# Patient Record
Sex: FEMALE | Race: OTHER | URBAN - METROPOLITAN AREA
[De-identification: names, ages, dates, MRNs, and addresses within clinical notes are randomized per-mention and may not be internally consistent; named-entity substitution may affect disease eponyms.]

---

## 2021-01-28 ENCOUNTER — CLINICAL SUPPORT (OUTPATIENT)
Dept: FAMILY MEDICINE CLINIC | Facility: HOSPITAL | Age: 87
End: 2021-01-28

## 2021-01-28 DIAGNOSIS — Z23 ENCOUNTER FOR IMMUNIZATION: Primary | ICD-10-CM

## 2021-01-28 PROCEDURE — 0011A SARS-COV-2 / COVID-19 MRNA VACCINE (MODERNA) 100 MCG: CPT | Performed by: ANESTHESIOLOGY

## 2021-01-28 PROCEDURE — 91301 SARS-COV-2 / COVID-19 MRNA VACCINE (MODERNA) 100 MCG: CPT | Performed by: ANESTHESIOLOGY

## 2021-03-05 ENCOUNTER — IMMUNIZATIONS (OUTPATIENT)
Dept: FAMILY MEDICINE CLINIC | Facility: HOSPITAL | Age: 87
End: 2021-03-05

## 2021-03-05 DIAGNOSIS — Z23 ENCOUNTER FOR IMMUNIZATION: Primary | ICD-10-CM

## 2021-03-05 PROCEDURE — 0012A SARS-COV-2 / COVID-19 MRNA VACCINE (MODERNA) 100 MCG: CPT

## 2021-03-05 PROCEDURE — 91301 SARS-COV-2 / COVID-19 MRNA VACCINE (MODERNA) 100 MCG: CPT

## 2021-08-16 ENCOUNTER — DOCUMENTATION (OUTPATIENT)
Dept: FAMILY MEDICINE | Facility: CLINIC | Age: 86
End: 2021-08-16

## 2021-08-16 PROCEDURE — 99305 1ST NF CARE MODERATE MDM 35: CPT | Performed by: FAMILY MEDICINE

## 2021-08-16 RX ORDER — AMLODIPINE BESYLATE 5 MG/1
5 TABLET ORAL DAILY
COMMUNITY

## 2021-08-16 RX ORDER — LEVOTHYROXINE SODIUM 75 UG/1
75 TABLET ORAL
COMMUNITY

## 2021-08-16 RX ORDER — ASPIRIN 81 MG/1
81 TABLET ORAL DAILY
COMMUNITY
End: 2022-02-18

## 2021-08-16 RX ORDER — ALBUTEROL SULFATE 5 MG/ML
2.5 SOLUTION RESPIRATORY (INHALATION) EVERY 6 HOURS PRN
COMMUNITY
End: 2022-02-18

## 2021-08-16 RX ORDER — PAROXETINE HYDROCHLORIDE 20 MG/1
20 TABLET, FILM COATED ORAL DAILY
COMMUNITY

## 2021-08-16 RX ORDER — ROSUVASTATIN CALCIUM 10 MG/1
10 TABLET, COATED ORAL DAILY
COMMUNITY

## 2021-08-16 RX ORDER — LANSOPRAZOLE 30 MG/1
30 TABLET, ORALLY DISINTEGRATING, DELAYED RELEASE ORAL
COMMUNITY

## 2021-08-16 NOTE — PROGRESS NOTES
[facility] admission note:     Transition history: Ms. Robin had a right total knee arthroplasty on 8/11/2021 at [facility], by Dr. Erick Mitchell.  she has a long history of osteoarthritis.      Past medical history includes hypertension, GERD, arthritis, right total hip replacement, hypothyroidism, melanoma of the forehead, urgent continence, osteopenia by x-ray, anxiety, status post bilateral shoulder joint replacement, bilateral breast cancer: Right lumpectomy on the right, and lumpectomy on the left with lymph node removal and right x-ray, hearing loss right ear,  Her daughter reports that she has developed more redness around the medial aspect of the wound today compared with the past 2 days, and expressed concern that there is a wound infection developing.       Allergies: None.  Social:   No smoking or  EtOH.  She has 7 children, most of whom live on [facility] [address] where she lived until her recent move to have referred to be with her daughter and grandchildren.  Her daughter is a physician.    Vitals: Temperature 98, respirations 12, pulse 72, 125/70, 94% saturation,    Exam:     Patient is in no distress.  HEENT: Normal, Hearing adequate  No carotid bruits.  No lymphadenopathy.  Lungs: Good air entry throughout, no wheezes, rales, or rhonchi.  Heart: Regular rate and rhythm without murmur, gallop, click or rub.  Abdomen: Soft, non-tender, no hepatosplenomegaly or masses.  Extremities: Right knee with the median longitudinal incision, which is well approximated.  There is scant yellow discharge, 3 spots, on the dressing.  There is 5 x 4 cm area of hyperemia, not sharply demarcated, and somewhat patchy, medial to the wound.  This is minimally warm, and minimally tender.  There is no redness on the lateral aspect of the wound.  The joint line is not tender and there is no knee effusion.  She is able to bend her knee to approximately 40 degrees.  No peripheral edema noted.  Good pedal  pulses.  Bilateral bunions, no ulceration.  Good capillary refill bilaterally no calf tenderness.  Reviewed hospital records,      Imp/P:    1: Status post right total knee arthroplasty: I photographed the knee and sent for for photographs, which had no patient information associated with them to the nurse practitioner, Winter, of  Is very light.  She immediately responded to me with a note that there is no signs of infection.  She then informed the daughter by telephone.  PT and OT evaluations, with progression of tolerated.  Tramadol as needed pain.  2: Hypertension controlled.  3: Gait dysfunction status post TKA: PT, patient oriented to call bell.  4: GERD: Controlled.     The resident is capable of understanding her rights  I have informed the resident of her condition and discussed it with her.  Rehab potential: Good  Prognosis: good

## 2021-08-18 PROBLEM — E03.8 OTHER SPECIFIED HYPOTHYROIDISM: Status: ACTIVE | Noted: 2021-08-18

## 2021-08-18 PROBLEM — Z96.652 TOTAL KNEE REPLACEMENT STATUS, LEFT: Status: ACTIVE | Noted: 2021-08-18

## 2021-08-18 PROBLEM — Z85.3 HISTORY OF BREAST CANCER: Status: ACTIVE | Noted: 2021-08-18

## 2021-08-18 PROBLEM — Z96.651 HX OF TOTAL KNEE ARTHROPLASTY, RIGHT: Status: ACTIVE | Noted: 2021-08-18

## 2021-08-18 PROCEDURE — 99309 SBSQ NF CARE MODERATE MDM 30: CPT | Performed by: INTERNAL MEDICINE

## 2021-08-25 PROCEDURE — 99309 SBSQ NF CARE MODERATE MDM 30: CPT | Performed by: INTERNAL MEDICINE

## 2022-02-18 ENCOUNTER — APPOINTMENT (INPATIENT)
Dept: RADIOLOGY | Facility: HOSPITAL | Age: 87
DRG: 194 | End: 2022-02-18
Attending: HOSPITALIST
Payer: MEDICARE

## 2022-02-18 ENCOUNTER — APPOINTMENT (EMERGENCY)
Dept: RADIOLOGY | Facility: HOSPITAL | Age: 87
DRG: 194 | End: 2022-02-18
Attending: EMERGENCY MEDICINE
Payer: MEDICARE

## 2022-02-18 ENCOUNTER — HOSPITAL ENCOUNTER (INPATIENT)
Facility: HOSPITAL | Age: 87
LOS: 6 days | Discharge: NURSING, REHAB OR LONG-TERM CARE | DRG: 194 | End: 2022-02-24
Attending: EMERGENCY MEDICINE | Admitting: HOSPITALIST
Payer: MEDICARE

## 2022-02-18 DIAGNOSIS — J18.9 PNEUMONIA DUE TO INFECTIOUS ORGANISM, UNSPECIFIED LATERALITY, UNSPECIFIED PART OF LUNG: Primary | ICD-10-CM

## 2022-02-18 PROBLEM — R09.02 HYPOXIA: Status: ACTIVE | Noted: 2022-02-18

## 2022-02-18 LAB
ANION GAP SERPL CALC-SCNC: 12 MEQ/L (ref 3–15)
BACTERIA URNS QL MICRO: ABNORMAL /HPF
BASOPHILS # BLD: 0.01 K/UL (ref 0.01–0.1)
BASOPHILS NFR BLD: 0.3 %
BILIRUB UR QL STRIP.AUTO: NEGATIVE MG/DL
BNP SERPL-MCNC: 85 PG/ML
BUN SERPL-MCNC: 17 MG/DL (ref 8–20)
CALCIUM SERPL-MCNC: 8.9 MG/DL (ref 8.9–10.3)
CHLORIDE SERPL-SCNC: 96 MEQ/L (ref 98–109)
CLARITY UR REFRACT.AUTO: CLEAR
CO2 SERPL-SCNC: 26 MEQ/L (ref 22–32)
COLOR UR AUTO: YELLOW
CREAT SERPL-MCNC: 0.9 MG/DL (ref 0.6–1.1)
DIFFERENTIAL METHOD BLD: ABNORMAL
EOSINOPHIL # BLD: 0 K/UL (ref 0.04–0.36)
EOSINOPHIL NFR BLD: 0 %
ERYTHROCYTE [DISTWIDTH] IN BLOOD BY AUTOMATED COUNT: 12.4 % (ref 11.7–14.4)
FLUAV RNA SPEC QL NAA+PROBE: NEGATIVE
FLUBV RNA SPEC QL NAA+PROBE: NEGATIVE
GFR SERPL CREATININE-BSD FRML MDRD: 59 ML/MIN/1.73M*2
GLUCOSE SERPL-MCNC: 120 MG/DL (ref 70–99)
GLUCOSE UR STRIP.AUTO-MCNC: NEGATIVE MG/DL
HCT VFR BLDCO AUTO: 34.5 % (ref 35–45)
HGB BLD-MCNC: 11.7 G/DL (ref 11.8–15.7)
HGB UR QL STRIP.AUTO: 1
HYALINE CASTS #/AREA URNS LPF: ABNORMAL /LPF
IMM GRANULOCYTES # BLD AUTO: 0.01 K/UL (ref 0–0.08)
IMM GRANULOCYTES NFR BLD AUTO: 0.3 %
KETONES UR STRIP.AUTO-MCNC: ABNORMAL MG/DL
LACTATE SERPL-SCNC: 0.9 MMOL/L (ref 0.4–2)
LEUKOCYTE ESTERASE UR QL STRIP.AUTO: NEGATIVE
LYMPHOCYTES # BLD: 0.72 K/UL (ref 1.2–3.5)
LYMPHOCYTES NFR BLD: 18.2 %
MCH RBC QN AUTO: 29.8 PG (ref 28–33.2)
MCHC RBC AUTO-ENTMCNC: 33.9 G/DL (ref 32.2–35.5)
MCV RBC AUTO: 87.8 FL (ref 83–98)
MONOCYTES # BLD: 0.38 K/UL (ref 0.28–0.8)
MONOCYTES NFR BLD: 9.6 %
MUCOUS THREADS URNS QL MICRO: ABNORMAL /LPF
NEUTROPHILS # BLD: 2.84 K/UL (ref 1.7–7)
NEUTS SEG NFR BLD: 71.6 %
NITRITE UR QL STRIP.AUTO: NEGATIVE
NRBC BLD-RTO: 0 %
PDW BLD AUTO: 9.7 FL (ref 9.4–12.3)
PH UR STRIP.AUTO: 6 [PH]
PLAT MORPH BLD: NORMAL
PLATELET # BLD AUTO: 171 K/UL (ref 150–369)
PLATELET # BLD EST: ABNORMAL 10*3/UL
POTASSIUM SERPL-SCNC: 3.7 MEQ/L (ref 3.6–5.1)
PROT UR QL STRIP.AUTO: ABNORMAL
RBC # BLD AUTO: 3.93 M/UL (ref 3.93–5.22)
RBC #/AREA URNS HPF: ABNORMAL /HPF
RBC MORPH BLD: NORMAL
RSV RNA SPEC QL NAA+PROBE: NEGATIVE
SARS-COV-2 RNA RESP QL NAA+PROBE: NEGATIVE
SODIUM SERPL-SCNC: 134 MEQ/L (ref 136–144)
SP GR UR REFRACT.AUTO: 1.02
SQUAMOUS URNS QL MICRO: ABNORMAL /HPF
TROPONIN I SERPL HS-MCNC: 15.7 PG/ML
UROBILINOGEN UR STRIP-ACNC: 0.2 EU/DL
WBC # BLD AUTO: 3.96 K/UL (ref 3.8–10.5)
WBC #/AREA URNS HPF: ABNORMAL /HPF

## 2022-02-18 PROCEDURE — 81001 URINALYSIS AUTO W/SCOPE: CPT | Performed by: PHYSICIAN ASSISTANT

## 2022-02-18 PROCEDURE — 83605 ASSAY OF LACTIC ACID: CPT | Performed by: PHYSICIAN ASSISTANT

## 2022-02-18 PROCEDURE — 87486 CHLMYD PNEUM DNA AMP PROBE: CPT | Performed by: HOSPITALIST

## 2022-02-18 PROCEDURE — 83880 ASSAY OF NATRIURETIC PEPTIDE: CPT | Performed by: PHYSICIAN ASSISTANT

## 2022-02-18 PROCEDURE — 87581 M.PNEUMON DNA AMP PROBE: CPT | Performed by: HOSPITALIST

## 2022-02-18 PROCEDURE — 84443 ASSAY THYROID STIM HORMONE: CPT | Performed by: HOSPITALIST

## 2022-02-18 PROCEDURE — 87637 SARSCOV2&INF A&B&RSV AMP PRB: CPT | Performed by: EMERGENCY MEDICINE

## 2022-02-18 PROCEDURE — 84484 ASSAY OF TROPONIN QUANT: CPT | Performed by: PHYSICIAN ASSISTANT

## 2022-02-18 PROCEDURE — 80076 HEPATIC FUNCTION PANEL: CPT | Performed by: HOSPITALIST

## 2022-02-18 PROCEDURE — G1004 CDSM NDSC: HCPCS

## 2022-02-18 PROCEDURE — 93005 ELECTROCARDIOGRAM TRACING: CPT | Performed by: PHYSICIAN ASSISTANT

## 2022-02-18 PROCEDURE — 99223 1ST HOSP IP/OBS HIGH 75: CPT | Performed by: HOSPITALIST

## 2022-02-18 PROCEDURE — 36415 COLL VENOUS BLD VENIPUNCTURE: CPT | Performed by: PHYSICIAN ASSISTANT

## 2022-02-18 PROCEDURE — 25800000 HC PHARMACY IV SOLUTIONS: Performed by: PHYSICIAN ASSISTANT

## 2022-02-18 PROCEDURE — 99285 EMERGENCY DEPT VISIT HI MDM: CPT | Mod: 25

## 2022-02-18 PROCEDURE — 63600105 HC IODINE BASED CONTRAST: Performed by: HOSPITALIST

## 2022-02-18 PROCEDURE — 12000000 HC ROOM AND CARE MED/SURG

## 2022-02-18 PROCEDURE — 71045 X-RAY EXAM CHEST 1 VIEW: CPT

## 2022-02-18 PROCEDURE — 87040 BLOOD CULTURE FOR BACTERIA: CPT | Performed by: PHYSICIAN ASSISTANT

## 2022-02-18 PROCEDURE — 63600000 HC DRUGS/DETAIL CODE: Performed by: PHYSICIAN ASSISTANT

## 2022-02-18 PROCEDURE — 80048 BASIC METABOLIC PNL TOTAL CA: CPT | Performed by: PHYSICIAN ASSISTANT

## 2022-02-18 PROCEDURE — 85025 COMPLETE CBC W/AUTO DIFF WBC: CPT | Performed by: PHYSICIAN ASSISTANT

## 2022-02-18 RX ORDER — CHLORTHALIDONE 25 MG/1
25 TABLET ORAL DAILY
COMMUNITY
End: 2022-02-24 | Stop reason: HOSPADM

## 2022-02-18 RX ORDER — LOSARTAN POTASSIUM 50 MG/1
50 TABLET ORAL DAILY
COMMUNITY

## 2022-02-18 RX ORDER — BUTALB/ACETAMINOPHEN/CAFFEINE 50-325-40
1 TABLET ORAL
COMMUNITY
Start: 2021-04-19

## 2022-02-18 RX ORDER — CETIRIZINE HYDROCHLORIDE 10 MG/1
10 TABLET ORAL DAILY
COMMUNITY

## 2022-02-18 RX ORDER — MULTIVITAMIN WITH MINERALS
1 TABLET ORAL DAILY
COMMUNITY

## 2022-02-18 RX ORDER — PNV NO.95/FERROUS FUM/FOLIC AC 28MG-0.8MG
100 TABLET ORAL DAILY
COMMUNITY

## 2022-02-18 RX ORDER — OMEPRAZOLE 20 MG/1
20 TABLET, DELAYED RELEASE ORAL DAILY
COMMUNITY
End: 2022-02-24 | Stop reason: HOSPADM

## 2022-02-18 RX ADMIN — CEFTRIAXONE SODIUM 1 G: 1 INJECTION, POWDER, FOR SOLUTION INTRAMUSCULAR; INTRAVENOUS at 23:55

## 2022-02-18 RX ADMIN — IOHEXOL 115 ML: 350 INJECTION, SOLUTION INTRAVENOUS at 23:57

## 2022-02-18 ASSESSMENT — ENCOUNTER SYMPTOMS
ABDOMINAL PAIN: 0
DIAPHORESIS: 0
VOMITING: 0
WEAKNESS: 0
SHORTNESS OF BREATH: 0
FATIGUE: 1
PALPITATIONS: 0
COUGH: 0
FEVER: 1
LIGHT-HEADEDNESS: 0
CHEST TIGHTNESS: 0
HEADACHES: 0
DIZZINESS: 0
CHILLS: 1

## 2022-02-19 PROBLEM — E87.1 HYPONATREMIA: Status: ACTIVE | Noted: 2022-02-19

## 2022-02-19 PROBLEM — E87.6 HYPOKALEMIA: Status: ACTIVE | Noted: 2022-02-19

## 2022-02-19 PROBLEM — I10 PRIMARY HYPERTENSION: Status: ACTIVE | Noted: 2022-02-19

## 2022-02-19 PROBLEM — J18.9 COMMUNITY ACQUIRED PNEUMONIA: Status: ACTIVE | Noted: 2022-02-19

## 2022-02-19 PROBLEM — R79.89 ELEVATED TROPONIN: Status: ACTIVE | Noted: 2022-02-19

## 2022-02-19 PROBLEM — J18.0 BRONCHOPNEUMONIA: Status: ACTIVE | Noted: 2022-02-19

## 2022-02-19 PROBLEM — E87.1 HYPONATREMIA: Status: RESOLVED | Noted: 2022-02-19 | Resolved: 2022-02-19

## 2022-02-19 LAB
ALBUMIN SERPL-MCNC: 3.8 G/DL (ref 3.4–5)
ALBUMIN SERPL-MCNC: 4.3 G/DL (ref 3.4–5)
ALP SERPL-CCNC: 59 IU/L (ref 35–126)
ALP SERPL-CCNC: 61 IU/L (ref 35–126)
ALT SERPL-CCNC: 20 IU/L (ref 11–54)
ALT SERPL-CCNC: 21 IU/L (ref 11–54)
ANION GAP SERPL CALC-SCNC: 11 MEQ/L (ref 3–15)
AST SERPL-CCNC: 36 IU/L (ref 15–41)
AST SERPL-CCNC: 37 IU/L (ref 15–41)
ATRIAL RATE: 95
BASOPHILS # BLD: 0.01 K/UL (ref 0.01–0.1)
BASOPHILS NFR BLD: 0.3 %
BILIRUB DIRECT SERPL-MCNC: 0.2 MG/DL
BILIRUB SERPL-MCNC: 0.8 MG/DL (ref 0.3–1.2)
BILIRUB SERPL-MCNC: 1 MG/DL (ref 0.3–1.2)
BUN SERPL-MCNC: 12 MG/DL (ref 8–20)
C PNEUM DNA SPEC QL NAA+PROBE: NEGATIVE
CALCIUM SERPL-MCNC: 8.6 MG/DL (ref 8.9–10.3)
CHLORIDE SERPL-SCNC: 99 MEQ/L (ref 98–109)
CO2 SERPL-SCNC: 26 MEQ/L (ref 22–32)
CREAT SERPL-MCNC: 0.6 MG/DL (ref 0.6–1.1)
DIFFERENTIAL METHOD BLD: ABNORMAL
EOSINOPHIL # BLD: 0.01 K/UL (ref 0.04–0.36)
EOSINOPHIL NFR BLD: 0.3 %
ERYTHROCYTE [DISTWIDTH] IN BLOOD BY AUTOMATED COUNT: 12.3 % (ref 11.7–14.4)
GFR SERPL CREATININE-BSD FRML MDRD: >60 ML/MIN/1.73M*2
GLUCOSE SERPL-MCNC: 112 MG/DL (ref 70–99)
HCT VFR BLDCO AUTO: 33.9 % (ref 35–45)
HGB BLD-MCNC: 11.6 G/DL (ref 11.8–15.7)
IMM GRANULOCYTES # BLD AUTO: 0.01 K/UL (ref 0–0.08)
IMM GRANULOCYTES NFR BLD AUTO: 0.3 %
LYMPHOCYTES # BLD: 0.76 K/UL (ref 1.2–3.5)
LYMPHOCYTES NFR BLD: 19.8 %
M PNEUMO DNA SPEC QL NAA+PROBE: NEGATIVE
MCH RBC QN AUTO: 29.7 PG (ref 28–33.2)
MCHC RBC AUTO-ENTMCNC: 34.2 G/DL (ref 32.2–35.5)
MCV RBC AUTO: 86.7 FL (ref 83–98)
MONOCYTES # BLD: 0.32 K/UL (ref 0.28–0.8)
MONOCYTES NFR BLD: 8.3 %
NEUTROPHILS # BLD: 2.73 K/UL (ref 1.7–7)
NEUTS SEG NFR BLD: 71 %
NRBC BLD-RTO: 0 %
P AXIS: 49
PDW BLD AUTO: 9.3 FL (ref 9.4–12.3)
PLATELET # BLD AUTO: 159 K/UL (ref 150–369)
POTASSIUM SERPL-SCNC: 3.4 MEQ/L (ref 3.6–5.1)
PR INTERVAL: 212
PROT SERPL-MCNC: 6.5 G/DL (ref 6–8.2)
PROT SERPL-MCNC: 7.4 G/DL (ref 6–8.2)
QRS DURATION: 92
QT INTERVAL: 354
QTC CALCULATION(BAZETT): 444
R AXIS: -36
RBC # BLD AUTO: 3.91 M/UL (ref 3.93–5.22)
SODIUM SERPL-SCNC: 136 MEQ/L (ref 136–144)
T WAVE AXIS: 31
TROPONIN I SERPL HS-MCNC: 14.5 PG/ML
TSH SERPL DL<=0.05 MIU/L-ACNC: 0.39 MIU/L (ref 0.34–5.6)
VENTRICULAR RATE: 95
WBC # BLD AUTO: 3.84 K/UL (ref 3.8–10.5)

## 2022-02-19 PROCEDURE — 63600000 HC DRUGS/DETAIL CODE: Performed by: HOSPITALIST

## 2022-02-19 PROCEDURE — 63700000 HC SELF-ADMINISTRABLE DRUG: Performed by: HOSPITALIST

## 2022-02-19 PROCEDURE — 94667 MNPJ CHEST WALL 1ST: CPT

## 2022-02-19 PROCEDURE — 36415 COLL VENOUS BLD VENIPUNCTURE: CPT | Performed by: PHYSICIAN ASSISTANT

## 2022-02-19 PROCEDURE — 80053 COMPREHEN METABOLIC PANEL: CPT | Performed by: HOSPITALIST

## 2022-02-19 PROCEDURE — 99232 SBSQ HOSP IP/OBS MODERATE 35: CPT | Performed by: HOSPITALIST

## 2022-02-19 PROCEDURE — 85025 COMPLETE CBC W/AUTO DIFF WBC: CPT | Performed by: HOSPITALIST

## 2022-02-19 PROCEDURE — 20600000 HC ROOM AND CARE INTERMEDIATE/TELEMETRY

## 2022-02-19 PROCEDURE — 93010 ELECTROCARDIOGRAM REPORT: CPT | Performed by: INTERNAL MEDICINE

## 2022-02-19 PROCEDURE — 87449 NOS EACH ORGANISM AG IA: CPT | Performed by: HOSPITALIST

## 2022-02-19 PROCEDURE — 86317 IMMUNOASSAY INFECTIOUS AGENT: CPT | Performed by: HOSPITALIST

## 2022-02-19 PROCEDURE — 25000000 HC PHARMACY GENERAL: Performed by: HOSPITALIST

## 2022-02-19 PROCEDURE — 25800000 HC PHARMACY IV SOLUTIONS: Performed by: HOSPITALIST

## 2022-02-19 PROCEDURE — 25000000 HC PHARMACY GENERAL: Performed by: PHYSICIAN ASSISTANT

## 2022-02-19 PROCEDURE — 84484 ASSAY OF TROPONIN QUANT: CPT | Performed by: PHYSICIAN ASSISTANT

## 2022-02-19 PROCEDURE — 93005 ELECTROCARDIOGRAM TRACING: CPT | Performed by: HOSPITALIST

## 2022-02-19 RX ORDER — ROSUVASTATIN CALCIUM 10 MG/1
10 TABLET, COATED ORAL DAILY
Status: DISCONTINUED | OUTPATIENT
Start: 2022-02-19 | End: 2022-02-24 | Stop reason: HOSPADM

## 2022-02-19 RX ORDER — PAROXETINE HYDROCHLORIDE 20 MG/1
20 TABLET, FILM COATED ORAL DAILY
Status: DISCONTINUED | OUTPATIENT
Start: 2022-02-19 | End: 2022-02-24 | Stop reason: HOSPADM

## 2022-02-19 RX ORDER — LEVOTHYROXINE SODIUM 75 UG/1
75 TABLET ORAL
Status: DISCONTINUED | OUTPATIENT
Start: 2022-02-19 | End: 2022-02-24 | Stop reason: HOSPADM

## 2022-02-19 RX ORDER — AMLODIPINE BESYLATE 5 MG/1
5 TABLET ORAL DAILY
Status: DISCONTINUED | OUTPATIENT
Start: 2022-02-19 | End: 2022-02-24 | Stop reason: HOSPADM

## 2022-02-19 RX ORDER — LOSARTAN POTASSIUM 50 MG/1
50 TABLET ORAL DAILY
Status: DISCONTINUED | OUTPATIENT
Start: 2022-02-19 | End: 2022-02-24 | Stop reason: HOSPADM

## 2022-02-19 RX ORDER — ACETAMINOPHEN 325 MG/1
650 TABLET ORAL EVERY 4 HOURS PRN
Status: DISCONTINUED | OUTPATIENT
Start: 2022-02-19 | End: 2022-02-24 | Stop reason: HOSPADM

## 2022-02-19 RX ORDER — GUAIFENESIN 600 MG/1
1200 TABLET, EXTENDED RELEASE ORAL 2 TIMES DAILY
Status: DISCONTINUED | OUTPATIENT
Start: 2022-02-19 | End: 2022-02-24 | Stop reason: HOSPADM

## 2022-02-19 RX ORDER — ALBUTEROL SULFATE 0.83 MG/ML
2.5 SOLUTION RESPIRATORY (INHALATION) EVERY 4 HOURS PRN
Status: DISCONTINUED | OUTPATIENT
Start: 2022-02-19 | End: 2022-02-19

## 2022-02-19 RX ORDER — IPRATROPIUM BROMIDE AND ALBUTEROL SULFATE 2.5; .5 MG/3ML; MG/3ML
3 SOLUTION RESPIRATORY (INHALATION) EVERY 6 HOURS
Status: DISCONTINUED | OUTPATIENT
Start: 2022-02-19 | End: 2022-02-24 | Stop reason: HOSPADM

## 2022-02-19 RX ORDER — PNV NO.95/FERROUS FUM/FOLIC AC 28MG-0.8MG
100 TABLET ORAL DAILY
Status: DISCONTINUED | OUTPATIENT
Start: 2022-02-19 | End: 2022-02-24 | Stop reason: HOSPADM

## 2022-02-19 RX ORDER — HEPARIN SODIUM 5000 [USP'U]/ML
5000 INJECTION, SOLUTION INTRAVENOUS; SUBCUTANEOUS EVERY 8 HOURS
Status: DISCONTINUED | OUTPATIENT
Start: 2022-02-19 | End: 2022-02-24 | Stop reason: HOSPADM

## 2022-02-19 RX ADMIN — HEPARIN SODIUM 5000 UNITS: 5000 INJECTION, SOLUTION INTRAVENOUS; SUBCUTANEOUS at 14:13

## 2022-02-19 RX ADMIN — PAROXETINE 20 MG: 20 TABLET, FILM COATED ORAL at 09:19

## 2022-02-19 RX ADMIN — CEFTRIAXONE SODIUM 1 G: 1 INJECTION, POWDER, FOR SOLUTION INTRAMUSCULAR; INTRAVENOUS at 23:40

## 2022-02-19 RX ADMIN — IPRATROPIUM BROMIDE AND ALBUTEROL SULFATE 3 ML: .5; 3 SOLUTION RESPIRATORY (INHALATION) at 23:45

## 2022-02-19 RX ADMIN — IPRATROPIUM BROMIDE AND ALBUTEROL SULFATE 3 ML: .5; 3 SOLUTION RESPIRATORY (INHALATION) at 17:59

## 2022-02-19 RX ADMIN — GUAIFENESIN 1200 MG: 600 TABLET, EXTENDED RELEASE ORAL at 22:25

## 2022-02-19 RX ADMIN — GUAIFENESIN 1200 MG: 600 TABLET, EXTENDED RELEASE ORAL at 09:19

## 2022-02-19 RX ADMIN — HEPARIN SODIUM 5000 UNITS: 5000 INJECTION, SOLUTION INTRAVENOUS; SUBCUTANEOUS at 05:16

## 2022-02-19 RX ADMIN — LOSARTAN POTASSIUM 50 MG: 50 TABLET, FILM COATED ORAL at 09:19

## 2022-02-19 RX ADMIN — LEVOTHYROXINE SODIUM 75 MCG: 75 TABLET ORAL at 05:38

## 2022-02-19 RX ADMIN — AMLODIPINE BESYLATE 5 MG: 5 TABLET ORAL at 09:20

## 2022-02-19 RX ADMIN — POTASSIUM BICARBONATE 40 MEQ: 391 TABLET, EFFERVESCENT ORAL at 11:45

## 2022-02-19 RX ADMIN — HEPARIN SODIUM 5000 UNITS: 5000 INJECTION, SOLUTION INTRAVENOUS; SUBCUTANEOUS at 22:25

## 2022-02-19 RX ADMIN — Medication 100 MCG: at 09:19

## 2022-02-19 RX ADMIN — AZITHROMYCIN DIHYDRATE 500 MG: 500 INJECTION, POWDER, LYOPHILIZED, FOR SOLUTION INTRAVENOUS at 01:50

## 2022-02-19 RX ADMIN — IPRATROPIUM BROMIDE AND ALBUTEROL SULFATE 3 ML: .5; 3 SOLUTION RESPIRATORY (INHALATION) at 11:45

## 2022-02-19 RX ADMIN — ROSUVASTATIN CALCIUM 10 MG: 10 TABLET, FILM COATED ORAL at 09:20

## 2022-02-19 ASSESSMENT — PATIENT HEALTH QUESTIONNAIRE - PHQ9: SUM OF ALL RESPONSES TO PHQ9 QUESTIONS 1 & 2: 0

## 2022-02-19 NOTE — PLAN OF CARE
Problem: Adult Inpatient Plan of Care  Goal: Patient-Specific Goal (Individualized)  2/19/2022 1810 by Katherine Davis RN  Flowsheets (Taken 2/19/2022 1806)  Patient-Specific Goals (Include Timeframe): decreased 3 lnc during shift to 2 lnc  Individualized Care Needs: mini nebs  Anxieties, Fears or Concerns: To feel better   Plan of Care Review  Progress: improving  Outcome Summary: Patient continues on mini nebs with improved oxygen saturation 93-94% on 2.5 lnc. VSS. Denies pain/discomfort. OOB to chair 50% of day and utilizing commode with assistent. Poor PO intake due to decreased appetite. Will continue to monitor;

## 2022-02-19 NOTE — ED PROVIDER NOTES
Emergency Medicine Note  HPI   HISTORY OF PRESENT ILLNESS     89-year-old female history of hypothyroid, hypertension, breast CA presents to the ER for cough.  Patient states over the last several days she has felt very fatigued.  She states she has been coughing with productive sputum.  She lives at home with family who are concerned that today she developed a temperature of 103.  She received 500 mg of Tylenol prior to ER arrival.  She denies any chest pain, shortness of breath, nausea, vomiting, diarrhea or abdominal pain.  Denies urinary symptoms.  She states she previously was diagnosed with chronic bronchitis and used to use inhalers but has not done so in several years.  She does not follow with cardiology or pulmonology.  She states there are multiple sick family members with similar symptoms at home.  Rapid Covid test at home negative.            Patient History   PAST HISTORY     Reviewed from Nursing Triage:       Past Medical History:   Diagnosis Date   • Breast cancer (CMS/HCC)    • Hypertension    • Hypothyroidism        Past Surgical History:   Procedure Laterality Date   • BREAST LUMPECTOMY Bilateral    • HIP SURGERY Right    • REPLACEMENT TOTAL KNEE Right    • SHOULDER SURGERY Bilateral        History reviewed. No pertinent family history.    Social History     Tobacco Use   • Smoking status: Former Smoker   • Smokeless tobacco: Never Used   • Tobacco comment: smoked ages [age]   Vaping Use   • Vaping Use: Never used   Substance Use Topics   • Alcohol use: Yes     Comment: rare   • Drug use: Not Currently         Review of Systems   REVIEW OF SYSTEMS     Review of Systems   Constitutional: Positive for chills, fatigue and fever. Negative for diaphoresis.   Respiratory: Negative for cough, chest tightness and shortness of breath.    Cardiovascular: Positive for chest pain. Negative for palpitations and leg swelling.   Gastrointestinal: Negative for abdominal pain and vomiting.   Skin: Negative for  rash.   Neurological: Negative for dizziness, weakness, light-headedness and headaches.         VITALS     ED Vitals    Date/Time Temp Pulse Resp BP SpO2 [facility]   02/18/22 2200 -- 98 20 -- 97 % KMW   02/18/22 2112 37.7 °C (99.9 °F) 98 20 173/82 98 % GFC        Pulse Ox %: 98 % (on 4L O2) (02/18/22 2114)  Pulse Ox Interpretation: Normal (02/18/22 2114)  Heart Rate: 98 (02/18/22 2114)  Rhythm Strip Interpretation: Normal Sinus Rhythm (02/18/22 2114)     Physical Exam   PHYSICAL EXAM     Physical Exam  Vitals and nursing note reviewed.   Constitutional:       General: She is not in acute distress.     Appearance: She is well-developed.   Cardiovascular:      Rate and Rhythm: Regular rhythm. Tachycardia present.      Heart sounds: Normal heart sounds. No murmur heard.  Pulmonary:      Effort: Pulmonary effort is normal. Tachypnea present. No respiratory distress.      Breath sounds: Normal breath sounds. No stridor.      Comments: Slight tachypnea during conversation.  88% on RA  Chest:      Chest wall: No tenderness.   Abdominal:      General: Bowel sounds are normal. There is no distension.      Palpations: Abdomen is soft.      Tenderness: There is no abdominal tenderness. There is no guarding or rebound.   Musculoskeletal:      Cervical back: Normal range of motion and neck supple.   Skin:     General: Skin is warm and dry.   Neurological:      Mental Status: She is alert and oriented to person, place, and time.           PROCEDURES     Procedures     DATA     Results     Procedure Component Value Units Date/Time    SARS-CoV-2 (COVID-19), PCR Nasopharynx [[id number]]  (Normal) Collected: 02/18/22 2128    Specimen: Nasopharyngeal Swab from Nasopharynx Updated: 02/18/22 2225    Narrative:      The following orders were created for panel order SARS-CoV-2 (COVID-19), PCR Nasopharynx.  Procedure                               Abnormality         Status                     ---------                               -----------          ------                     SARS-COV-2 (COVID-19)/ F...[[id number]]  Normal              Final result                 Please view results for these tests on the individual orders.    SARS-COV-2 (COVID-19)/ FLU A/B, AND RSV, PCR Nasopharynx [[id number]]  (Normal) Collected: 02/18/22 2128    Specimen: Nasopharyngeal Swab from Nasopharynx Updated: 02/18/22 2225     SARS-CoV-2 (COVID-19) Negative     Influenza A Negative     Influenza B Negative     Respiratory Syncytial Virus Negative    B-type natriuretic peptide [[id number]]  (Normal) Collected: 02/18/22 2130    Specimen: Blood, Venous Updated: 02/18/22 2216     BNP 85 pg/mL     HS Troponin I (with 2 hour reflex) [[id number]]  (Abnormal) Collected: 02/18/22 2130    Specimen: Blood, Venous Updated: 02/18/22 2215     High Sens Troponin I 15.70 pg/mL     CBC and differential [[id number]]  (Abnormal) Collected: 02/18/22 2130    Specimen: Blood, Venous Updated: 02/18/22 2210     WBC 3.96 K/uL      RBC 3.93 M/uL      Hemoglobin 11.7 g/dL      Hematocrit 34.5 %      MCV 87.8 fL      MCH 29.8 pg      MCHC 33.9 g/dL      RDW 12.4 %      Platelets 171 K/uL      MPV 9.7 fL      Differential Type Auto     nRBC 0.0 %      Immature Granulocytes 0.3 %      Neutrophils 71.6 %      Lymphocytes 18.2 %      Monocytes 9.6 %      Eosinophils 0.0 %      Basophils 0.3 %      Immature Granulocytes, Absolute 0.01 K/uL      Neutrophils, Absolute 2.84 K/uL      Lymphocytes, Absolute 0.72 K/uL      Monocytes, Absolute 0.38 K/uL      Eosinophils, Absolute 0.00 K/uL      Basophils, Absolute 0.01 K/uL      RBC Morphology Normal     PLT Morphology Normal     Platelet Estimate Adequate (150,000-400,000)    Basic metabolic panel [[id number]]  (Abnormal) Collected: 02/18/22 2130    Specimen: Blood, Venous Updated: 02/18/22 2207     Sodium 134 mEQ/L      Potassium 3.7 mEQ/L      Chloride 96 mEQ/L      CO2 26 mEQ/L      BUN 17 mg/dL      Creatinine 0.9 mg/dL      Glucose 120 mg/dL      Calcium 8.9 mg/dL       eGFR 59.0 mL/min/1.73m*2      Anion Gap 12 mEQ/L     UA with reflex culture [[id number]]  (Abnormal) Collected: 02/18/22 2137    Specimen: Urine, Clean Catch Updated: 02/18/22 2148    Narrative:      The following orders were created for panel order UA with reflex culture.  Procedure                               Abnormality         Status                     ---------                               -----------         ------                     UA Reflex to Culture (Ma...[[id number]]  Abnormal            Final result               UA Microscopic[[id number]]               Abnormal            Final result                 Please view results for these tests on the individual orders.    UA Microscopic [[id number]]  (Abnormal) Collected: 02/18/22 2137    Specimen: Urine, Clean Catch Updated: 02/18/22 2148     RBC, Urine 0 TO 4 /HPF      WBC, Urine 0 TO 3 /HPF      Squamous Epithelial Rare /hpf      Hyaline Cast None Seen /lpf      Bacteria, Urine None Seen /HPF      MUCUSUA Rare /LPF     UA Reflex to Culture (Macroscopic) [[id number]]  (Abnormal) Collected: 02/18/22 2137    Specimen: Urine, Clean Catch Updated: 02/18/22 2147     Color, Urine Yellow     Clarity, Urine Clear     Specific Gravity, Urine 1.016     pH, Urine 6.0     Leukocyte Esterase Negative     Comment: Results can be falsely negative due to high specific gravity, some antibiotics, glucose >3 g/dl, or WBC other than neutrophils.        Nitrite, Urine Negative     Protein, Urine Trace     Comment: Trace False Positive Protein can be seen with alkaline or highly buffered urines or urine with high specific gravity. Suggest clinical correlation.        Glucose, Urine Negative mg/dL      Ketones, Urine Trace mg/dL      Comment: Free sulfhydryl drugs such as Mesna, Capoten, and Acetylcysteine (Mucomyst) may cause false positive ketonuria.        Urobilinogen, Urine 0.2 EU/dL      Bilirubin, Urine Negative mg/dL      Blood, Urine +1     Comment: The sensitivity of  the occult blood test is equivalent to approximately 4 intact RBC/HPF.       Lactate, w/ reflex repeat if > 2.0 [[id number]]  (Normal) Collected: 02/18/22 2130    Specimen: Blood, Venous Updated: 02/18/22 2146     Lactate 0.9 mmol/L     Blood Culture Blood, Venous [[id number]] Collected: 02/18/22 2130    Specimen: Blood, Venous Updated: 02/18/22 2144    Blood Culture Blood, Venous [[id number]] Collected: 02/18/22 2130    Specimen: Blood, Venous Updated: 02/18/22 2144          Imaging Results          X-RAY CHEST 1 VIEW (In process)                 ECG 12 lead    (Results Pending)       Scoring tools                                 ED Course & MDM   MDM / ED COURSE / CLINICAL IMPRESSIONS / DISPO     MDM    ED Course as of 02/19/22 0348   Fri Feb 18, 2022 2209 Lactate: 0.9 [EB]   2209 WBC: 3.96 [EB]   2222 BNP: 85 [EB]   2222 High Sens Troponin I(!): 15.70 [EB]   Sat Feb 19, 2022   0120 Pt daughter updated [EB]      ED Course User Index  [EB] Wendie Grant PA C         Clinical Impressions as of 02/19/22 0348   Pneumonia due to infectious organism, unspecified laterality, unspecified part of lung              Wendie Grant PA C  02/18/22 2351       Wendie Grant PA C  02/19/22 0348

## 2022-02-19 NOTE — NURSING NOTE
Patient verbalized during admission process that she is a DNR/DNI. Patient was instructed to bring in her advanced directive so that we have it on file. Dr Carlos notified of patient update.

## 2022-02-19 NOTE — ASSESSMENT & PLAN NOTE
Blood pressures are acceptable while here.    Continue holding the thiazide diuretic.   Continue Losartan and Amlodipine.

## 2022-02-19 NOTE — PLAN OF CARE
Problem: Adult Inpatient Plan of Care  Goal: Plan of Care Review  Flowsheets (Taken 2/19/2022 1430)  Progress: no change  Plan of Care Reviewed With: patient  Outcome Summary: Pt screened 2/2 MST score for unsure of weight loss. Pt in bed at time of visit and lunch at bedside 50% consumed.  Per pt she does not weigh herself, but has not noticed any weight changes.  Pt with outpatient weights that vary from 129-141#.  Current bed scale weight 122#.  Expect pt's intake to continue to improve as her PNA improves.    Goals:  Pt to meet >/= 50% of needs via PO  Weight maintenance    Recommendations:  Will liberalize diet to CARLO  Encourage PO as able  Continue to monitor need for supplements  Monitor weight weekly

## 2022-02-19 NOTE — PROGRESS NOTES
"   Hospital Medicine Service -  Daily Progress Note       SUBJECTIVE   Interval History: Patient reports feeling much better compared to arrival.  Still feeling weak.  We received a call from microbiology lab requesting viral panel.  ID physician was consulted.     OBJECTIVE      Vital signs in last 24 hours:  Temp:  [36.6 °C (97.9 °F)-37.7 °C (99.9 °F)] 36.6 °C (97.9 °F)  Heart Rate:  [81-98] 90  Resp:  [18-32] 18  BP: (105-173)/(56-82) 105/56    Intake/Output Summary (Last 24 hours) at 2/19/2022 1439  Last data filed at 2/19/2022 1000  Gross per 24 hour   Intake 310 ml   Output --   Net 310 ml       PHYSICAL EXAMINATION      Physical Exam   Visit Vitals  BP (!) 105/56 (BP Location: Right upper arm, Patient Position: Lying)   Pulse 90   Temp 36.6 °C (97.9 °F)   Resp 18   Ht 1.651 m (5' 5\")   Wt 55.3 kg (122 lb) Comment: bed scale at time of my visit   SpO2 95%   BMI 20.30 kg/m²     General:  Awake, alert.  No acute distress.  HEENT: Sclerae anicteric.  Moist mucus membranes.   Lungs: Bilateral ronchis. No wheezing.   Cardiovascular:  Regular rate and rhythm.  No murmurs.  Abdomen:  Soft, non tender, non distended.  Positive bowel sounds.  :NA  Extremities:  No edema bilaterally.  Neuro: Non-focal.  Skin: Pink  Psych: A/Ox3; full range affect, pleasant and cooperative.     LABS / IMAGING / TELE      Labs  CBC   CBC Results       02/19/22 02/18/22 0424 2130    WBC 3.84 3.96    RBC 3.91 3.93    HGB 11.6 11.7    HCT 33.9 34.5    MCV 86.7 87.8    MCH 29.7 29.8    MCHC 34.2 33.9     171         BMP or CMP   BMP Results       02/19/22 02/18/22 0424 2130     134    K 3.4 3.7    Cl 99 96    CO2 26 26    Glucose 112 120    BUN 12 17    Creatinine 0.6 0.9    Calcium 8.6 8.9    Anion Gap 11 12    EGFR >60.0 59.0        CMP Results       02/19/22 02/18/22 0424 2130     134    K 3.4 3.7    Cl 99 96    CO2 26 26    Glucose 112 120    BUN 12 17    Creatinine 0.6 0.9    Calcium 8.6 8.9    Anion Gap " 11 12    AST 37 36    ALT 20 21    Albumin 3.8 4.3    EGFR >60.0 59.0           INR          Imaging  CT ANGIOGRAPHY CHEST PULMONARY EMBOLISM WITH IV CONTRAST    Result Date: 2/19/2022  CLINICAL HISTORY: Pulmonary embolism (PE) suspected, high prob     IMPRESSION: 1.  Limited assessment due to significant motion artifact.. No large central pulmonary embolism. If there is continued concern consider repeat study when the patient can better tolerate the examination. 2.  Multifocal airspace disease greatest in the left lower lobe could be related to multifocal pneumonia, aspiration pneumonitis and/or atelectasis. 3.  Peribronchial thickening on the left with some narrowing possible endobronchial fluid in the left bronchi also raises the possibility of aspiration versus mucous. 4.  Mild left hilar and subcarinal lymphadenopathy. This could be reactive to the airspace disease although recommend follow-up chest CT in perhaps in 8-12 weeks to document resolution. 5.  Additional findings discussed below. Findings are concordant with preliminary report provided by Dr. Lino at 12:56 AM 2/19/2022 COMMENT: Comparison: Chest x-ray from 2/18/2022 Technique: CTA of the chest pulmonary embolism protocol was performed with contrast.  3D MIP and/or volume rendered image reconstruction performed and reviewed. 115 mL Omnipaque 350 given. CT DOSE:  One or more dose reduction techniques (e.g. automated exposure control, adjustment of the mA and/or kV according to patient size, use of iterative reconstruction technique) utilized for this examination. FINDINGS: LUNG, PLEURA AND LARGE AIRWAYS: Motion artifact limits assessment. Trachea and the proximal bronchi on the right remain patent. There is peribronchial thickening and narrowing in the left lower lobe and lingula which is associated with patchy airspace consolidation in the left lower lobe which could be related to pneumonia. It is difficult to further define the bronchial  findings due to the degree of motion artifact. However there does appear to be some endobronchial material within the left major bronchi as they course to the hilum possibly representing fluid. Aspiration should be considered. Some patchy airspace opacity versus atelectasis is also noted in the right middle lobe and minimally in the right lower lobe. There is no evidence of a pneumothorax. There may be trace left pleural fluid without significant effusion. VESSELS: Normal caliber main pulmonary artery. There is no main or lobar pulmonary embolism. The remaining pulmonary arteries are limited in their evaluation due to substantial motion artifact markedly degraded quality. Normal caliber of the thoracic aorta with no gross acute findings. At this chronic changes are noted throughout including the coronary arteries. HEART: Heart is enlarged. No pericardial effusion. MEDIASTINUM AND ISABELLE: There is left hilar lymphadenopathy. For example on axial image 121 there is a 1.7 cm lymph node. Follow-up would be recommended document resolution. Mild subcarinal lymphadenopathy measuring 1.4 cm. Minimal hiatal hernia. CHEST WALL AND LOWER NECK: Postsurgical changes left axilla. UPPER ABDOMEN:Atheromatous changes. Limited assessment of the unenhanced motion degraded upper abdominal structures reveals no gross findings of concern. BONES: Demineralization. Spinal curvature. Dextroconvex scoliosis. Degenerative disease. No acute or suspicious findings. Bilateral shoulder reversed arthroplasties.     X-RAY CHEST 1 VIEW    Result Date: 2/19/2022  Chest x-ray CLINICAL HISTORY: Shortness of breath. COMPARISON: None. COMMENT: Portable AP upright examination of the chest obtained. Cardiomediastinal silhouette and pulmonary vascularity within normal limits. Lungs are grossly clear.  Trachea is midline.  No pleural fluid or air. Uncoiling of the thoracic aorta.  Mild scoliosis of the thoracic spine. Bilateral shoulder arthroplasties.      IMPRESSION: No acute cardiopulmonary process.        ECG/Telemetry  I have independently reviewed the telemetry. No events for the last 24 hours.    ASSESSMENT AND PLAN      * Hypoxia  Assessment & Plan  Assessment: 88% on room air at initial evaluation by EMS and subsequently improved with 2 liters of supplemental O2.  Has recent sick contacts  No leukocytosis is noted.   Most likely viral infection- bronchopneumonia.     Plan:  Consult ID re respiratory viral panel.   Continue O2 supplementation.   SLP evaluation  Continue Ceftriaxone/ Azithromycin  Incentive spirometry and PEP therapy.      Primary hypertension  Assessment & Plan  Hold thiazine. Continue Losartan and Amlodipine.     Hypokalemia  Assessment & Plan  Replace K.     Elevated troponin  Assessment & Plan  Assessment: Troponin elevated at 15.70. Suspect secondary to demand ischemia in the setting of acute illness.  Not MI.     Bronchopneumonia  Assessment & Plan  Continue antibiotics w Ceftriaxone/ azithromycin  Add duoneb around the clock when awake.       Other specified hypothyroidism  Assessment & Plan  Continue levothyroxine.        VTE Assessment: Padua VTE Score: 5  VTE Prophylaxis Plan: heparin  Code Status: DNR (A.N.D.)  Estimated Discharge Date: 2/21/2022  Disposition Planning: tele     Nevaeh Curtis MD  2/19/2022

## 2022-02-19 NOTE — ED ATTESTATION NOTE
"I have personally seen and examined the patient.  I reviewed and agree with physician assistant’s assessment and plan of care, except as where stated below.  My examination, assessment, and plan of care of Merced Robin is as follows:     Patient is an 89-year-old female who presents the emergency department for evaluation of \"bronchitis \".  Patient states that for the past few days she has not been feeling well.  Complains of generalized malaise, cough.  Reports that she has multiple family members at home sick with the same thing.  Today, the patient had a fever.  She was given 500 mg of Tylenol just prior to EMS arrival.  On EMS arrival, patient was found to be satting 88% on room air, placed on 4 L nasal cannula oxygen and brought to the emergency department.    On arrival, patient is awake, alert, in no acute distress.  Heart rate is normal.  She is slightly tachypneic with rhonchorous respirations.  Abdomen is soft, nontender.  No lower extremity edema.    On arrival, temperature is 99.9.  She is on 4 L of nasal cannula oxygen to maintain sats in the mid nineties.  Will check Covid/flu/RSV, chest x-ray, lab work         Tiarra Fan MD  02/18/22 9564    "

## 2022-02-19 NOTE — PLAN OF CARE
Plan of Care Review  Progress: improving  Outcome Summary: Patient continues on mini nebs with improved oxygen saturation 93-94% on 2.5 lnc. VSS. Denies pain/discomfort. OOB to chair 50% of day and utilizing commode with assistent. Poor PO intake due to decreased appetite. Will continue to monitor;

## 2022-02-19 NOTE — ASSESSMENT & PLAN NOTE
Assessment: 88% on room air at initial evaluation by EMS, subsequently improved with 2 liters of supplemental O2. Has recent sick contacts. No leukocytosis is noted.   Most likely viral infection- bronchopneumonia, bacterial suprainfection seems unlikely.    Plan:  Continue supportive care.

## 2022-02-19 NOTE — ASSESSMENT & PLAN NOTE
Assessment: Na 134, secondary to urinary sodium loss with chlorthalidone use +/- lack of appetite and poor PO intake.    Plan:  Hold chlorthalidone. Monitor volume status closely.   Repeat BMP in AM.   Slow rate of IV NS as concerned that the patient is slightly dehydrated.

## 2022-02-19 NOTE — H&P
Hospital Medicine Service -  History & Physical        CHIEF COMPLAINT   Generalized Malaise, cough     HISTORY OF PRESENT ILLNESS      Merced Robin is a 89 y.o. female with a past medical history of hypothyroidism, HTN and breast cancer who presents with generalized malaise, cough and fever.  Patient reports that her grandson was recently ill with a cough and fever and subsequently she started to have the same symptoms. She states that her symptoms consist of cough, congestion, fevers, chills, fatigue, lack of appetite, generalized malaise and shortness of breath with exertion. She states that her daughter noted that the patient was having shortness of breath at rest and that she developed a fever of Tmax 103 today leading to her to come to the ER for further evaluation. It was noted that the patient was hypoxic too 88% on arrival of EMS and improved with supplemental O2 via nasal cannula at 2 L. She denies nausea, vomiting, abdominal pain, chest pain, palpitations, pain with deep respirations, dysuria and diarrhea. She denies the use of any antibiotics recently. She reports that she has a history of chronic bronchitis when she was younger.    In the ER, the patient received a chest xray and was started on IV Ceftriaxone and IV Azithromycin to provide coverage for possible pneumonia.     PAST MEDICAL AND SURGICAL HISTORY      Past Medical History:   Diagnosis Date   • Breast cancer (CMS/HCC)    • Hypertension    • Hypothyroidism        Past Surgical History:   Procedure Laterality Date   • BREAST LUMPECTOMY Bilateral    • HIP SURGERY Right    • REPLACEMENT TOTAL KNEE Right    • SHOULDER SURGERY Bilateral        PCP: Pt States, No Pcp    MEDICATIONS      Prior to Admission medications    Medication Sig Start Date End Date Taking? Authorizing Provider   calcium citrate-vitamin D3 (CITRACAL+D) 315 mg-5 mcg (200 unit) per tablet Take 1 tablet by mouth once daily. 4/19/21  Yes Provider, MD Halley   amLODIPine  (NORVASC) 5 mg tablet Take 5 mg by mouth daily.    Halley Espino MD   cetirizine (ZyrTEC) 10 mg tablet Take 10 mg by mouth daily.    Halley Espino MD   chlorthalidone (HYGROTEN) 25 mg tablet Take 25 mg by mouth daily.    Halley Espino MD   cyanocobalamin (VITAMIN B12) 100 mcg tablet Take 100 mcg by mouth daily.    Halley Espino MD   lansoprazole (PREVACID SOLUTAB) 30 mg disintegrating tablet Take 30 mg by mouth daily before breakfast.    Halley Espino MD   levothyroxine (SYNTHROID) 75 mcg tablet Take 75 mcg by mouth daily.    Halley Espino MD   losartan (COZAAR) 50 mg tablet Take 50 mg by mouth daily.    Halley Espino MD   multivitamin with minerals (DAILY MULTIVITAMIN-MINERALS) tablet Take 1 tablet by mouth daily.    Halley Espino MD   omeprazole OTC (PriLOSEC OTC) 20 mg EC tablet Take 20 mg by mouth daily.    Halley Espino MD   PARoxetine (PAXIL) 20 mg tablet Take 20 mg by mouth daily.    Halley Espino MD   rosuvastatin (CRESTOR) 10 mg tablet Take 10 mg by mouth daily.    Halley Espino MD   albuterol 5 mg/mL nebulizer solution 2.5 mg by continuous nebulization route every 6 (six) hours as needed for shortness of breath.  2/18/22  Halley Espino MD   aspirin 81 mg enteric coated tablet Take 81 mg by mouth daily.  2/18/22  Halley Espino MD       ALLERGIES      Sulfa (sulfonamide antibiotics)    FAMILY HISTORY      History reviewed. No pertinent family history.    SOCIAL HISTORY      Social History     Socioeconomic History   • Marital status: Single     Spouse name: None   • Number of children: None   • Years of education: None   • Highest education level: None   Occupational History   • None   Tobacco Use   • Smoking status: Former Smoker   • Smokeless tobacco: Never Used   • Tobacco comment: smoked ages [age]   Vaping Use   • Vaping Use: Never used   Substance and Sexual Activity   • Alcohol use: Yes     Comment:  rare   • Drug use: Not Currently   • Sexual activity: None   Other Topics Concern   • None   Social History Narrative   • None     Social Determinants of Health     Financial Resource Strain: Not on file   Food Insecurity: No Food Insecurity   • Worried About Running Out of Food in the Last Year: Never true   • Ran Out of Food in the Last Year: Never true   Transportation Needs: Not on file   Physical Activity: Not on file   Stress: Not on file   Social Connections: Not on file   Intimate Partner Violence: Not on file   Housing Stability: Not on file       REVIEW OF SYSTEMS      All other systems reviewed and negative except as noted in HPI    PHYSICAL EXAMINATION      Temp:  [37.7 °C (99.9 °F)] 37.7 °C (99.9 °F)  Heart Rate:  [98] 98  Resp:  [20] 20  BP: (173)/(82) 173/82  There is no height or weight on file to calculate BMI.    Physical Exam  Vitals and nursing note reviewed.   Constitutional:       Comments: Thin, appears stated age, conversational dyspnea is present, tachypneic   HENT:      Head: Normocephalic and atraumatic.      Right Ear: External ear normal.      Left Ear: External ear normal.      Nose: Nose normal. No congestion or rhinorrhea.      Mouth/Throat:      Mouth: Mucous membranes are moist.      Pharynx: Oropharynx is clear. No oropharyngeal exudate or posterior oropharyngeal erythema.   Eyes:      General: No scleral icterus.     Extraocular Movements: Extraocular movements intact.      Conjunctiva/sclera: Conjunctivae normal.      Pupils: Pupils are equal, round, and reactive to light.   Cardiovascular:      Rate and Rhythm: Regular rhythm. Tachycardia present.      Heart sounds: No murmur heard.  Pulmonary:      Effort: Respiratory distress present.      Breath sounds: Wheezing and rhonchi present.      Comments: Bilateral scattered expiratory wheeze and scattered rhonchi at the bases, tachypnea, conversational dyspnea is present, on 2 liters of supplemental O2  Chest:      Chest wall: No  tenderness.   Abdominal:      General: Bowel sounds are normal. There is no distension.      Palpations: Abdomen is soft.      Tenderness: There is no abdominal tenderness. There is no guarding or rebound.   Genitourinary:     Comments: deferred  Musculoskeletal:         General: No swelling or tenderness.      Cervical back: Normal range of motion and neck supple. No rigidity or tenderness.      Right lower leg: No edema.      Left lower leg: No edema.   Lymphadenopathy:      Cervical: No cervical adenopathy.   Skin:     General: Skin is warm and dry.      Findings: No rash.   Neurological:      General: No focal deficit present.      Mental Status: She is alert and oriented to person, place, and time.      Cranial Nerves: No cranial nerve deficit.      Sensory: No sensory deficit.      Motor: No weakness.      Coordination: Coordination normal.   Psychiatric:         Mood and Affect: Mood normal.         Behavior: Behavior normal.         LABS / IMAGING / EKG        Labs  I have reviewed the patient's pertinent labs.  Significant abnormals are Na 134, Hgb 11.7, HS Trop 15.70.  Lab Results   Component Value Date    GLUCOSE 120 (H) 02/18/2022    CALCIUM 8.9 02/18/2022     (L) 02/18/2022    K 3.7 02/18/2022    CO2 26 02/18/2022    CL 96 (L) 02/18/2022    BUN 17 02/18/2022    CREATININE 0.9 02/18/2022     Lab Results   Component Value Date    WBC 3.96 02/18/2022    HGB 11.7 (L) 02/18/2022    HCT 34.5 (L) 02/18/2022    MCV 87.8 02/18/2022     02/18/2022     Troponin I Results       02/18/22     2130    HS Troponin I 15.70      Results for NICK CHAPA (MRN [id number]) as of 2/18/2022 23:23   Ref. Range 2/18/2022 21:37   Color, Urine Latest Ref Range: Yellow, Colorless  Yellow   Clarity, Urine Latest Ref Range: Clear  Clear   Specific Gravity, Urine Latest Ref Range: 1.005 - 1.030  1.016   pH, Urine Latest Ref Range: 4.5 - 8.0  6.0   Leukocyte Esterase Latest Ref Range: Negative  Negative   Nitrite, Urine  Latest Ref Range: Negative  Negative   Protein, Urine Latest Ref Range: Negative  Trace (A)   Glucose, Urine Latest Ref Range: Negative mg/dL Negative   Ketones, Urine Latest Ref Range: Negative mg/dL Trace (A)   Urobilinogen, Urine Latest Ref Range: <2.0 EU/dL EU/dL 0.2   Bilirubin, Urine Latest Ref Range: Negative mg/dL Negative   Blood, Urine Latest Ref Range: Negative  +1 (A)   RBC, Urine Latest Ref Range: 0 TO 4 /HPF 0 TO 4   WBC, Urine Latest Ref Range: 0 TO 3 /HPF 0 TO 3   Bacteria, Urine Latest Ref Range: None Seen /HPF None Seen   Squamous Epithelial Latest Ref Range: None Seen /hpf Rare (A)   Hyaline Cast Latest Ref Range: None Seen /lpf None Seen   Results for NICK CHAPA (MRN [id number]) as of 2/18/2022 23:23   Ref. Range 2/18/2022 21:28   Influenza A Latest Ref Range: Negative  Negative   Influenza B Latest Ref Range: Negative  Negative   Respiratory Syncytial Virus Latest Ref Range: Negative  Negative   SARS-CoV-2 (COVID-19) Latest Ref Range: Negative  Negative       Imaging  I have independently reviewed the pertinent imaging from the last 24 hrs.  Chest Xray- personally reviewed    SARS-CoV-2 (COVID-19) (no units)   Date/Time Value   02/18/2022 2128 Negative       ASSESSMENT AND PLAN           * Hypoxia  Assessment & Plan  Assessment: 88% on room air at initial evaluation by EMS and subsequently improved with 2 liters of supplemental O2. Chest xray is personally reviewed with ? Right middle lobe infiltrate. She has recent sick contacts, however, also has been less ambulatory due to feeling unwell. No leukocytosis is noted. Differentials include pneumonia vs pulmonary embolism.     Plan:  Will admit and monitor O2 sats closely.   Obtain stat CT Chest PE Study to r/o underlying PE and also to evaluated the lungs better.  Supplemental O2 to maintain O2 sats >92%.  Incentive spirometry and PEP therapy.  Will treat for underlying pneumonia.    Community acquired pneumonia  Assessment & Plan  Assessment:  Chest xray with ? Right middle lobe infiltrate. + recent sick contacts. COVID/Influenza/RSV negative.    Plan:  Will admit and monitor patient closely.  Continue IV ceftriaxone 1g q24h and IV azithromycin 500 mg daily.  CT Chest PE study ordered to r/o PE and to assess lungs further.  Sputum cultures ordered. Blood cultures pending.   Check strep pneumoniae, chlamydia pneumoniae and mycoplasma pneumoniae.   Currently requiring 2 L of supplemental oxygen. Maintain O2 sats >92% and wean as able.   Mucinex 1200 mg BID.  Incentive spirometry and PEP therapy.       Elevated troponin  Assessment & Plan  Assessment: Troponin elevated at 15.70. Suspect secondary to demand ischemia in the setting of acute illness.    Plan:  Monitor closely on telemetry.  Serial troponins.   If troponins are rising will consider Cardiology consult.     Hyponatremia  Assessment & Plan  Assessment: Na 134, secondary to urinary sodium loss with chlorthalidone use +/- lack of appetite and poor PO intake.    Plan:  Hold chlorthalidone. Monitor volume status closely.   Repeat BMP in AM.   Slow rate of IV NS as concerned that the patient is slightly dehydrated.       VTE Assessment: [place] VTE Score: 5  VTE Prophylaxis: Heparin  Code Status: DNR (A.N.D.)  Palliative Care Screening Score: 0   Estimated Discharge Date:   Disposition Planning: Anticipate hospitalization for 48-72 hrs. Will have PT evaluate to assess for needs at discharge.      Jefe Carlos MD  2/18/2022

## 2022-02-19 NOTE — ASSESSMENT & PLAN NOTE
Due to human metapneumovirus.   Continue supportive care.  Antibiotics stopped.  Appreciate ID input.

## 2022-02-20 PROBLEM — K21.9 GERD (GASTROESOPHAGEAL REFLUX DISEASE): Status: ACTIVE | Noted: 2022-02-20

## 2022-02-20 PROBLEM — E78.5 HYPERLIPIDEMIA: Status: ACTIVE | Noted: 2022-02-20

## 2022-02-20 PROBLEM — M19.90 ARTHRITIS: Status: ACTIVE | Noted: 2022-02-20

## 2022-02-20 LAB
ANION GAP SERPL CALC-SCNC: 9 MEQ/L (ref 3–15)
BASOPHILS # BLD: 0.04 K/UL (ref 0.01–0.1)
BASOPHILS NFR BLD: 1 %
BUN SERPL-MCNC: 16 MG/DL (ref 8–20)
CALCIUM SERPL-MCNC: 8.4 MG/DL (ref 8.9–10.3)
CHLORIDE SERPL-SCNC: 98 MEQ/L (ref 98–109)
CO2 SERPL-SCNC: 28 MEQ/L (ref 22–32)
CREAT SERPL-MCNC: 0.5 MG/DL (ref 0.6–1.1)
DIFFERENTIAL METHOD BLD: ABNORMAL
EOSINOPHIL # BLD: 0 K/UL (ref 0.04–0.36)
EOSINOPHIL NFR BLD: 0 %
ERYTHROCYTE [DISTWIDTH] IN BLOOD BY AUTOMATED COUNT: 12.2 % (ref 11.7–14.4)
FLUAV RNA SPEC QL NAA+PROBE: NEGATIVE
FLUBV RNA SPEC QL NAA+PROBE: NEGATIVE
GFR SERPL CREATININE-BSD FRML MDRD: >60 ML/MIN/1.73M*2
GLUCOSE SERPL-MCNC: 106 MG/DL (ref 70–99)
GRAM STN SPEC: NORMAL
HADV DNA SPEC QL NAA+PROBE: NEGATIVE
HCOV 229E RNA SPEC QL NAA+PROBE: NEGATIVE
HCOV HKU1 RNA SPEC QL NAA+PROBE: NEGATIVE
HCOV NL63 RNA SPEC QL NAA+PROBE: NEGATIVE
HCOV OC43 RNA SPEC QL NAA+PROBE: NEGATIVE
HCT VFR BLDCO AUTO: 32.3 % (ref 35–45)
HGB BLD-MCNC: 11.2 G/DL (ref 11.8–15.7)
HMPV RNA SPEC QL NAA+PROBE: POSITIVE
HPIV1 RNA SPEC QL NAA+PROBE: NEGATIVE
HPIV2 RNA SPEC QL NAA+PROBE: NEGATIVE
HPIV3 RNA SPEC QL NAA+PROBE: NEGATIVE
HPIV4 RNA SPEC QL NAA+PROBE: NEGATIVE
LYMPHOCYTES # BLD: 0.67 K/UL (ref 1.2–3.5)
LYMPHOCYTES NFR BLD: 19 %
MAGNESIUM SERPL-MCNC: 1.8 MG/DL (ref 1.8–2.5)
MCH RBC QN AUTO: 30.4 PG (ref 28–33.2)
MCHC RBC AUTO-ENTMCNC: 34.7 G/DL (ref 32.2–35.5)
MCV RBC AUTO: 87.8 FL (ref 83–98)
MONOCYTES # BLD: 0.11 K/UL (ref 0.28–0.8)
MONOCYTES NFR BLD: 3 %
NEUTS BAND # BLD: 2.72 K/UL (ref 1.7–7)
NEUTS SEG NFR BLD: 77 %
PDW BLD AUTO: 9.6 FL (ref 9.4–12.3)
PLAT MORPH BLD: NORMAL
PLATELET # BLD AUTO: 159 K/UL (ref 150–369)
PLATELET # BLD EST: ABNORMAL 10*3/UL
POTASSIUM SERPL-SCNC: 3.3 MEQ/L (ref 3.6–5.1)
RBC # BLD AUTO: 3.68 M/UL (ref 3.93–5.22)
RBC MORPH BLD: NORMAL
RSV RNA SPEC QL NAA+PROBE: NEGATIVE
RV+EV RNA SPEC QL NAA+PROBE: NEGATIVE
SODIUM SERPL-SCNC: 135 MEQ/L (ref 136–144)
TEST PERFORMANCE INFO SPEC: ABNORMAL
WBC # BLD AUTO: 3.53 K/UL (ref 3.8–10.5)

## 2022-02-20 PROCEDURE — 63700000 HC SELF-ADMINISTRABLE DRUG: Performed by: HOSPITALIST

## 2022-02-20 PROCEDURE — 85025 COMPLETE CBC W/AUTO DIFF WBC: CPT | Performed by: HOSPITALIST

## 2022-02-20 PROCEDURE — 87205 SMEAR GRAM STAIN: CPT | Performed by: HOSPITALIST

## 2022-02-20 PROCEDURE — 83735 ASSAY OF MAGNESIUM: CPT | Performed by: HOSPITALIST

## 2022-02-20 PROCEDURE — 36415 COLL VENOUS BLD VENIPUNCTURE: CPT | Performed by: HOSPITALIST

## 2022-02-20 PROCEDURE — 63600000 HC DRUGS/DETAIL CODE: Performed by: HOSPITALIST

## 2022-02-20 PROCEDURE — 80048 BASIC METABOLIC PNL TOTAL CA: CPT | Performed by: HOSPITALIST

## 2022-02-20 PROCEDURE — 87633 RESP VIRUS 12-25 TARGETS: CPT | Performed by: INTERNAL MEDICINE

## 2022-02-20 PROCEDURE — 87070 CULTURE OTHR SPECIMN AEROBIC: CPT | Performed by: HOSPITALIST

## 2022-02-20 PROCEDURE — 92610 EVALUATE SWALLOWING FUNCTION: CPT | Mod: GN

## 2022-02-20 PROCEDURE — 25000000 HC PHARMACY GENERAL: Performed by: HOSPITALIST

## 2022-02-20 PROCEDURE — 20600000 HC ROOM AND CARE INTERMEDIATE/TELEMETRY

## 2022-02-20 PROCEDURE — 99233 SBSQ HOSP IP/OBS HIGH 50: CPT | Performed by: HOSPITALIST

## 2022-02-20 PROCEDURE — 25800000 HC PHARMACY IV SOLUTIONS: Performed by: HOSPITALIST

## 2022-02-20 RX ORDER — PANTOPRAZOLE SODIUM 40 MG/1
40 TABLET, DELAYED RELEASE ORAL DAILY
Status: DISCONTINUED | OUTPATIENT
Start: 2022-02-20 | End: 2022-02-24 | Stop reason: HOSPADM

## 2022-02-20 RX ORDER — TRAMADOL HYDROCHLORIDE 50 MG/1
50 TABLET ORAL EVERY 12 HOURS PRN
Status: DISCONTINUED | OUTPATIENT
Start: 2022-02-20 | End: 2022-02-24 | Stop reason: HOSPADM

## 2022-02-20 RX ORDER — POTASSIUM CHLORIDE 750 MG/1
40 TABLET, FILM COATED, EXTENDED RELEASE ORAL ONCE
Status: DISCONTINUED | OUTPATIENT
Start: 2022-02-20 | End: 2022-02-20

## 2022-02-20 RX ADMIN — ROSUVASTATIN CALCIUM 10 MG: 10 TABLET, FILM COATED ORAL at 09:58

## 2022-02-20 RX ADMIN — IPRATROPIUM BROMIDE AND ALBUTEROL SULFATE 3 ML: .5; 3 SOLUTION RESPIRATORY (INHALATION) at 12:44

## 2022-02-20 RX ADMIN — IPRATROPIUM BROMIDE AND ALBUTEROL SULFATE 3 ML: .5; 3 SOLUTION RESPIRATORY (INHALATION) at 17:46

## 2022-02-20 RX ADMIN — LOSARTAN POTASSIUM 50 MG: 50 TABLET, FILM COATED ORAL at 09:59

## 2022-02-20 RX ADMIN — AMLODIPINE BESYLATE 5 MG: 5 TABLET ORAL at 09:59

## 2022-02-20 RX ADMIN — IPRATROPIUM BROMIDE AND ALBUTEROL SULFATE 3 ML: .5; 3 SOLUTION RESPIRATORY (INHALATION) at 06:11

## 2022-02-20 RX ADMIN — GUAIFENESIN 1200 MG: 600 TABLET, EXTENDED RELEASE ORAL at 20:37

## 2022-02-20 RX ADMIN — POTASSIUM BICARBONATE 40 MEQ: 391 TABLET, EFFERVESCENT ORAL at 09:59

## 2022-02-20 RX ADMIN — HEPARIN SODIUM 5000 UNITS: 5000 INJECTION, SOLUTION INTRAVENOUS; SUBCUTANEOUS at 21:20

## 2022-02-20 RX ADMIN — GUAIFENESIN 1200 MG: 600 TABLET, EXTENDED RELEASE ORAL at 09:58

## 2022-02-20 RX ADMIN — PAROXETINE 20 MG: 20 TABLET, FILM COATED ORAL at 09:58

## 2022-02-20 RX ADMIN — LEVOTHYROXINE SODIUM 75 MCG: 75 TABLET ORAL at 06:11

## 2022-02-20 RX ADMIN — PANTOPRAZOLE SODIUM 40 MG: 40 TABLET, DELAYED RELEASE ORAL at 17:48

## 2022-02-20 RX ADMIN — CEFTRIAXONE SODIUM 1 G: 1 INJECTION, POWDER, FOR SOLUTION INTRAMUSCULAR; INTRAVENOUS at 22:38

## 2022-02-20 RX ADMIN — POTASSIUM BICARBONATE 40 MEQ: 391 TABLET, EFFERVESCENT ORAL at 12:44

## 2022-02-20 RX ADMIN — AZITHROMYCIN MONOHYDRATE 500 MG: 500 INJECTION, POWDER, LYOPHILIZED, FOR SOLUTION INTRAVENOUS at 01:05

## 2022-02-20 RX ADMIN — HEPARIN SODIUM 5000 UNITS: 5000 INJECTION, SOLUTION INTRAVENOUS; SUBCUTANEOUS at 06:11

## 2022-02-20 RX ADMIN — Medication 100 MCG: at 09:59

## 2022-02-20 RX ADMIN — HEPARIN SODIUM 5000 UNITS: 5000 INJECTION, SOLUTION INTRAVENOUS; SUBCUTANEOUS at 14:49

## 2022-02-20 ASSESSMENT — COGNITIVE AND FUNCTIONAL STATUS - GENERAL
REMEMBERING 5 ERRANDS WITH NO LIST: 4 - NONE
AFFECT: WFL
REMEMBERING TO TAKE MEDICATION: 4 - NONE
UNDERSTANDING 10 TO 15 MIN SPEECH: 4 - NONE
TAKING CARE OF COMPLICATED TASKS: 4 - NONE
REMEMBERING WHERE THINGS ARE: 4 - NONE
FOLLOWS FAMILIAR CONVERSATION: 4 - NONE

## 2022-02-20 NOTE — HOSPITAL COURSE
Merced is a 89 y.o. female admitted on 2/18/2022 with Hypoxia [R09.02]  Pneumonia due to infectious organism, unspecified laterality, unspecified part of lung [J18.9]. Principal problem is Hypoxia.    Past Medical History  Merced has a past medical history of Breast cancer (CMS/HCC), Hypertension, and Hypothyroidism.    History of Present Illness   Pt consumes a regular diet with thin liquids at baseline.  Presented to ER on 2/18/2022 with generalized malaise, cough and fever.  Patient reports that her grandson was recently ill with a cough and fever and subsequently she started to have the same symptoms. She states that her symptoms consist of cough, congestion, fevers, chills, fatigue, lack of appetite, generalized malaise and shortness of breath with exertion. She states that her daughter noted that the patient was having shortness of breath at rest and that she developed a fever of Tmax 103 today leading to her to come to the ER for further evaluation. It was noted that the patient was hypoxic too 88% on arrival of EMS and improved with supplemental O2 via nasal cannula at 2 L. Diagnosed with viral pneumonia.

## 2022-02-20 NOTE — PROGRESS NOTES
Patient:  Merced Robin  Location:  [facility]  MRN:  [id number]  Today's date:  2/20/2022  SPEECH PATHOLOGY EVALUATION:     SLP Diagnosis: Mild oral dysphagia and suspected pharyngeal dysphagia with suspicion for possible esophageal dysphagia    Recommendations:  1. Regular diet L7 with Moderately thick liquids L3   2. Strict GERD precautions including remaining upright for at least 60 minutes after meals  3. Aspiration Precautions  4. Frequent oral care  5. Meds whole or crushed in puree  6. Video swallow study for further assessment of swallowing function  7. Consider PPI management for reflux symptoms  8. SLP will follow    Summary/Impressions:  Daily Outcome Statement: SLP evaluation completed.  Pt admitted from home for hypoxia.  PMH includes: GERD, hypothyroidism, HTN and breast cancer.  There is concern for pneumonia on her imaging studies therefore SLP was consulted.  Pt awake, alert, oriented x4 upon arrival.  She denied any swallowing difficulty but c/o occasional regurgitation and globus sensation at the level of the suprasternal notch.  She consumes a regular diet with thin liquids at baseline but reports a poor appetite.  Her breakfast tray was present and pt self fed trials of regular solid, thin liquid, mildly thick liquid, moderately thick liquid.  She had adequate bolus access and containment.  Mastication was slow but with effective bolus breakdown given time.  There was inconsistent incohesive bolus formation.  Lingual transfers were timely.  There was suspected posterior leakage with liquids and delayed onset of the swallow.  Laryngeal elevation was reduced and there was a discoordinated swallow pattern with liquids.  She had intermittent throat clearing with thin and mildly thick liquid c/f aspiration.  Pt presents with mild oral dysphagia and suspected pharyngeal dysphagia.  There is also c/f esophageal dysphagia.  Recommend downgrading diet to moderately thick liquids and  continue regular solids for now.  Given b/s performance and c/f pneumonia, instrumental swallowing assessment is recommended for further evaluation of swallowing function.  Explained recommendations and rationale to pt, she is disappointed with thickened liquids but agreeable to this diet level and instrumental swallowing assessment.  SLP will continue to follow.    Chief Complaint   Patient presents with   • Fever   • Weakness - Generalized     Clinical Course: This 89 y.o. female was admitted 2/18/2022 with Hypoxia [R09.02]  Pneumonia due to infectious organism, unspecified laterality, unspecified part of lung [J18.9].   Merced Robin is a 89 y.o. female with a past medical history of hypothyroidism, HTN and breast cancer who presents with generalized malaise, cough and fever.  Patient reports that her grandson was recently ill with a cough and fever and subsequently she started to have the same symptoms. She states that her symptoms consist of cough, congestion, fevers, chills, fatigue, lack of appetite, generalized malaise and shortness of breath with exertion. She states that her daughter noted that the patient was having shortness of breath at rest and that she developed a fever of Tmax 103 today leading to her to come to the ER for further evaluation. It was noted that the patient was hypoxic too 88% on arrival of EMS and improved with supplemental O2 via nasal cannula at 2 L. She denies nausea, vomiting, abdominal pain, chest pain, palpitations, pain with deep respirations, dysuria and diarrhea. She denies the use of any antibiotics recently. She reports that she has a history of chronic bronchitis when she was younger.     In the ER, the patient received a chest xray and was started on IV Ceftriaxone and IV Azithromycin to provide coverage for possible pneumonia.     Reason for Consult: assess swallowing function    Pertinent Radiology Studies:    CT ANGIOGRAPHY CHEST PULMONARY EMBOLISM WITH IV CONTRAST      Result Date: 2/19/2022  CLINICAL HISTORY: Pulmonary embolism (PE) suspected, high prob      IMPRESSION: 1.  Limited assessment due to significant motion artifact.. No large central pulmonary embolism. If there is continued concern consider repeat study when the patient can better tolerate the examination. 2.  Multifocal airspace disease greatest in the left lower lobe could be related to multifocal pneumonia, aspiration pneumonitis and/or atelectasis. 3.  Peribronchial thickening on the left with some narrowing possible endobronchial fluid in the left bronchi also raises the possibility of aspiration versus mucous. 4.  Mild left hilar and subcarinal lymphadenopathy. This could be reactive to the airspace disease although recommend follow-up chest CT in perhaps in 8-12 weeks to document resolution. 5.  Additional findings discussed below. Findings are concordant with preliminary report provided by Dr. Lino at 12:56 AM 2/19/2022 COMMENT: Comparison: Chest x-ray from 2/18/2022 Technique: CTA of the chest pulmonary embolism protocol was performed with contrast.  3D MIP and/or volume rendered image reconstruction performed and reviewed. 115 mL Omnipaque 350 given. CT DOSE:  One or more dose reduction techniques (e.g. automated exposure control, adjustment of the mA and/or kV according to patient size, use of iterative reconstruction technique) utilized for this examination. FINDINGS: LUNG, PLEURA AND LARGE AIRWAYS: Motion artifact limits assessment. Trachea and the proximal bronchi on the right remain patent. There is peribronchial thickening and narrowing in the left lower lobe and lingula which is associated with patchy airspace consolidation in the left lower lobe which could be related to pneumonia. It is difficult to further define the bronchial findings due to the degree of motion artifact. However there does appear to be some endobronchial material within the left major bronchi as they course to the hilum  possibly representing fluid. Aspiration should be considered. Some patchy airspace opacity versus atelectasis is also noted in the right middle lobe and minimally in the right lower lobe. There is no evidence of a pneumothorax. There may be trace left pleural fluid without significant effusion. VESSELS: Normal caliber main pulmonary artery. There is no main or lobar pulmonary embolism. The remaining pulmonary arteries are limited in their evaluation due to substantial motion artifact markedly degraded quality. Normal caliber of the thoracic aorta with no gross acute findings. At this chronic changes are noted throughout including the coronary arteries. HEART: Heart is enlarged. No pericardial effusion. MEDIASTINUM AND ISABELLE: There is left hilar lymphadenopathy. For example on axial image 121 there is a 1.7 cm lymph node. Follow-up would be recommended document resolution. Mild subcarinal lymphadenopathy measuring 1.4 cm. Minimal hiatal hernia. CHEST WALL AND LOWER NECK: Postsurgical changes left axilla. UPPER ABDOMEN:Atheromatous changes. Limited assessment of the unenhanced motion degraded upper abdominal structures reveals no gross findings of concern. BONES: Demineralization. Spinal curvature. Dextroconvex scoliosis. Degenerative disease. No acute or suspicious findings. Bilateral shoulder reversed arthroplasties.      X-RAY CHEST 1 VIEW     Result Date: 2/19/2022  Chest x-ray CLINICAL HISTORY: Shortness of breath. COMPARISON: None. COMMENT: Portable AP upright examination of the chest obtained. Cardiomediastinal silhouette and pulmonary vascularity within normal limits. Lungs are grossly clear.  Trachea is midline.  No pleural fluid or air. Uncoiling of the thoracic aorta.  Mild scoliosis of the thoracic spine. Bilateral shoulder arthroplasties.      IMPRESSION: No acute cardiopulmonary process.    Past Medical History:   Diagnosis Date   • Breast cancer (CMS/HCC)    • Hypertension    • Hypothyroidism      Past  Surgical History:   Procedure Laterality Date   • BREAST LUMPECTOMY Bilateral    • HIP SURGERY Right    • REPLACEMENT TOTAL KNEE Right    • SHOULDER SURGERY Bilateral        Allergies   Allergen Reactions   • Sulfa (Sulfonamide Antibiotics) Rash         Results from last 7 days   Lab Units 02/20/22  0601 02/19/22  0424 02/18/22  2130   WBC K/uL 3.53* 3.84 3.96   HEMOGLOBIN g/dL 11.2* 11.6* 11.7*   HEMATOCRIT % 32.3* 33.9* 34.5*   PLATELETS K/uL 159 159 171          Baseline Diet/Method of Nutritional Intake: regular,thin liquids  Current Diet: (See below)       Dietary Orders   (From admission, onward)             Start     Ordered    02/19/22 1435  Adult Diet Regular; 3-4gm Sodium CARLO; RD/LDN may adjust order  Diet effective now        References:    IDDSI Diet reference   Question Answer Comment   Diet Texture Regular    Sodium Restriction: 3-4gm Sodium CARLO    Delegation of Authority. Diet orders written by PA/CRNPs may not be adjusted by RD/LDNs. RD/LDN may adjust order        02/19/22 1434                      RN cleared SLP to assess/work with patient. Following completion of session, pt remained in bed as they were found with call bell in reach.    Mercde is a 89 y.o. female admitted on 2/18/2022 with Hypoxia [R09.02]  Pneumonia due to infectious organism, unspecified laterality, unspecified part of lung [J18.9]. Principal problem is Hypoxia.    Past Medical History  Merced has a past medical history of Breast cancer (CMS/HCC), Hypertension, and Hypothyroidism.    History of Present Illness   Pt consumes a regular diet with thin liquids at baseline.       SLP Pain    Date/Time Pain Type Rating: Rest Rating: Activity Who   02/20/22 0813 Pain Assessment 0 - no pain 0 - no pain LC            Prior Level of Function      Most Recent Value   Swallowing swallows foods/liquids without difficulty   Baseline Diet/Method of Nutritional Intake regular, thin liquids   Past History of Dysphagia Pt c/o occasional regurgitation  of food and globus sensation at the level of the suprasternal notch           SLP Evaluation and Treatment - 02/20/22 0813        SLP Time Calculation    Start Time 0813     Stop Time 0835     Time Calculation (min) 22 min        Session Details    Document Type initial evaluation     Mode of Treatment speech language pathology        General Information    Patient Profile Reviewed yes     Onset of Illness/Injury or Date of Surgery 02/18/22     Referring Physician Kirsten     General Observations of Patient Pt seen at bedside     Existing Precautions/Restrictions aspiration;fall;modified diet        Cognition/Psychosocial    Affect/Mental Status (Cognition) WFL     Orientation Status (Cognition) oriented x 4     Follows Commands (Cognition) WFL        Dentition (Oral Motor)    Dentition (Oral Motor) natural dentition        Facial Symmetry (Oral Motor)    Facial Symmetry (Oral Motor) WNL        Lip Function (Oral Motor)    Lip Range of Motion (Oral Motor) WNL        Tongue Function (Oral Motor)    Tongue ROM (Oral Motor) protrusion is impaired     Protrusion, Tongue ROM Impairment (Oral Motor) minimal impairment;right side        Cough/Swallow/Gag Reflex (Oral Motor)    Volitional Throat Clear/Cough (Oral Motor) WNL     Volitional Swallow (Oral Motor) WNL        Auditory Comprehension    Comment, Assessment (Auditory Comprehension) Intact, able to follow commands and respond to questions appropriately        Functional Communication Measures    FCM: Swallowing 4-->Level 4        General Swallowing Observations    Current Diet/Method of Nutritional Intake regular;thin liquids     Signs/Symptoms of Aspiration (Current Diet) pneumonia;other (see comments)   RML infiltrates    Respiratory Support (General Swallowing Observations) nasal cannula        Food and Liquid Trials (NIS)    Patient Positioning HOB elevated (specify degrees)     Oral Intake/Feeding Performance independent/appropriate self-feeding skills     Liquid  Consistencies Evaluated thin liquids;mildly thick liquids (MT2);moderately thick liquids (MO3)     Thin Liquids sips from cup;straw sips;single cup sips     Comment, Thin Liquids + throat clear, cough x1     Mildly Thick Liquids (MT2) use of teaspoon;sips from cup     Comment, Mildly Thick Liquids (MT2) intermittent throat clear     Moderately Thick Liquids (MO3) use of teaspoon;sips from cup     Comment, Moderately Thick Liquids (MO3) no overt s/sx of aspiration     Food Consistencies Evaluated regular     Regular patient controlled amounts     Comment, Regular no overt s/sx of aspiration     Oral Preparatory Phase of Swallow mastication, slow but effective;bolus cohesion decreased     Oral Phase of Swallow suspect posterior bolus leak     Pharyngeal Phase of Swallow delayed swallow reflex initiation;decreased laryngeal elevation;uncoordinated swallow;throat clearing noted after the swallow     Esophageal Phase of Swallow frequent belching;regurgitation following intake;sensation of food/liquid stuck, lower throat/chest        Swallowing Recommendations    Diet Consistency Recommendations regular;moderately thick liquids (MO3)     Medication Administration whole with pureed;crushed with pureed     Supervision Level for Intake distant supervision needed     Feeding/Delivery Recommendations stop meal if showing signs of aspiration or fatigue (e.g., coughing, wet voice)     Posture Recommendations fully upright in chair/chair mode of bed     Swallowing Strategies alternate food and liquid intake     Instrumental Assessment Recommendations VFSS (videofluroscopic swallowing study)     Recommend VFSS (Comment) 2/2 b/s performance and c/f pneumonia        Swallowing Intervention    Dysphagia/Swallowing Interventions monitor tolerance of;current diet without evidence of aspiration;advanced diet/liquid texture trials;caregiver training        AM-PAC (TM) - Cognition (Current Function)    Following/understanding a 10-15  minute speech or presentation? 4 - None     Understanding familiar people during ordinary conversations? 4 - None     Remembering to take medications at the appropriate time? 4 - None     Remembering where things were placed or put away? 4 - None     Remembering a list of 3 or 4 errands without writing it down? 4 - None     Taking care of complicated tasks? 4 - None     AM-PAC (TM) Cognition Score 24        SLP Goals    Swallowing Goal Selection oral nutrition/hydration, SLP goal (free text)        Assessment/Plan (SLP)    Daily Outcome Statement SLP evaluation completed.  Pt admitted from home for hypoxia.  PMH includes: GERD, hypothyroidism, HTN and breast cancer.  There is concern for pneumonia on her imaging studies therefore SLP was consulted.  Pt awake, alert, oriented x4 upon arrival.  She denied any swallowing difficulty but c/o occasional regurgitation and globus sensation at the level of the suprasternal notch.  She consumes a regular diet with thin liquids at baseline but reports a poor appetite.  Her breakfast tray was present and pt self fed trials of regular solid, thin liquid, mildly thick liquid, moderately thick liquid.  She had adequate bolus access and containment.  Mastication was slow but with effective bolus breakdown given time.  There was inconsistent incohesive bolus formation.  Lingual transfers were timely.  There was suspected posterior leakage with liquids and delayed onset of the swallow.  Laryngeal elevation was reduced and there was a discoordinated swallow pattern with liquids.  She had intermittent throat clearing with thin and mildly thick liquid c/f aspiration.  Pt presents with mild oral dysphagia and suspected pharyngeal dysphagia.  There is also c/f esophageal dysphagia.  Recommend downgrading diet to moderately thick liquids and continue regular solids for now.  Given b/s performance and c/f pneumonia, instrumental swallowing assessment is recommended for further evaluation of  swallowing function.  Explained recommendations and rationale to pt, she is disappointed with thickened liquids but agreeable to this diet level and instrumental swallowing assessment.  SLP will continue to follow.     Rehab Potential good, to achieve stated therapy goals     Therapy Frequency 3-5 times/wk     Planned Therapy Interventions dysphagia therapy               SLP Assessment/Plan      Most Recent Value   SLP Diagnosis Mild oral dysphagia and suspected pharyngeal dysphagia with suspicion for possible esophageal dysphagia at 02/20/2022 0813   Patient/Family Therapy Goal Statement to improve breathing at 02/20/2022 0813               Education Documentation  Thickening Liquids for Dysphagia Diet, taught by Blank Loyola CCC-SLP at 2/20/2022  8:57 AM.  Learner: Patient  Readiness: Acceptance  Method: Explanation  Response: Verbalizes Understanding, Needs Reinforcement  Comment: ST will follow    Dysphagia Eating Plan, Pureed, taught by Blank Loyola CCC-SLP at 2/20/2022  8:57 AM.  Learner: Patient  Readiness: Acceptance  Method: Explanation  Response: Verbalizes Understanding, Needs Reinforcement  Comment: ST will follow    Dysphagia Eating Plan, Minced and Moist Foods, taught by Blank Loyola CCC-SLP at 2/20/2022  8:57 AM.  Learner: Patient  Readiness: Acceptance  Method: Explanation  Response: Verbalizes Understanding, Needs Reinforcement  Comment: ST will follow    Dysphagia Eating Plan, Bite Size Food, taught by Blank Loyola CCC-SLP at 2/20/2022  8:57 AM.  Learner: Patient  Readiness: Acceptance  Method: Explanation  Response: Verbalizes Understanding, Needs Reinforcement  Comment: ST will follow    Dysphagia, taught by Blank Loyola CCC-SLP at 2/20/2022  8:57 AM.  Learner: Patient  Readiness: Acceptance  Method: Explanation  Response: Verbalizes Understanding, Needs Reinforcement  Comment: ST will follow    Unresolved/Worsening Symptoms, taught by Blank Loyola CCC-SLP at 2/20/2022   8:57 AM.  Learner: Patient  Readiness: Acceptance  Method: Explanation  Response: Verbalizes Understanding, Needs Reinforcement  Comment: ST will follow    Swallow Enhancement, taught by Blank Loyola CCC-SLP at 2/20/2022  8:57 AM.  Learner: Patient  Readiness: Acceptance  Method: Explanation  Response: Verbalizes Understanding, Needs Reinforcement  Comment: ST will follow    Oral Hygiene, taught by Blank Loyola CCC-SLP at 2/20/2022  8:57 AM.  Learner: Patient  Readiness: Acceptance  Method: Explanation  Response: Verbalizes Understanding, Needs Reinforcement  Comment: ST will follow    Diet Modification, taught by Blank Loyola CCC-SLP at 2/20/2022  8:57 AM.  Learner: Patient  Readiness: Acceptance  Method: Explanation  Response: Verbalizes Understanding, Needs Reinforcement  Comment: ST will follow    Signs/Symptoms, taught by Blank Loyola CCC-SLP at 2/20/2022  8:57 AM.  Learner: Patient  Readiness: Acceptance  Method: Explanation  Response: Verbalizes Understanding, Needs Reinforcement  Comment: ST will follow    Risk Factors, taught by Blank Loyola CCC-SLP at 2/20/2022  8:57 AM.  Learner: Patient  Readiness: Acceptance  Method: Explanation  Response: Verbalizes Understanding, Needs Reinforcement  Comment: ST will follow          SLP Goals      Most Recent Value   Oral Nutrition/Hydration Goal    Oral Nutrition/Hydration Goal Patient will tolerate least restrictive diet without overt s/sx of aspiration at 02/20/2022 0813   Time Frame by discharge at 02/20/2022 0813   Progress/Outcome goal ongoing at 02/20/2022 0813

## 2022-02-20 NOTE — PLAN OF CARE
Problem: Adult Inpatient Plan of Care  Goal: Plan of Care Review  Outcome: Progressing  Flowsheets (Taken 2/20/2022 8776)  Progress: no change  Plan of Care Reviewed With:   patient   caregiver  Outcome Summary: ST evaluation completed.  Recommend a regular diet with moderately thick liquids.

## 2022-02-20 NOTE — PROGRESS NOTES
Patient Name: Donya Hairston  Reason for Visit:   Chief Complaint   Patient presents with   • Annual Exam     Age: 51 year old  Visit Date: 08/30/19    Annual Wellness Exam  -Last CPE: was a year ago   Subjective      50 y/o F w/ Pmhx of GERD, colon polyps here for CPE    Has been reporting LLQ abdominal pain and epigastrum  -It has been going on for a month   -Daily occurrence  -Describes a dull pain  -She states she feels bloated   -Denies diarrhea or constipation  -Denies nausea or vomiting  -Reports good appetite  -6 years ago she did have colonoscopy found colon polyps--> benign  -Wants to see Dr. Mandel   -Mother did pass from colon cancer at 49 y/o   -No decreased caliber of the stool  -No blood in the stool, no black tarry stools  -Denies hx of h pylori or ulcers  -Certain foods worsen, like fried foods  -decreased vegetable intake   -has been post menopausal for 5 years  -denies vaginal bleeding   -had a pap smear 1 year ago-->normal  -Denies f/c, no n/v, no SOB, no chest pain, no urinary changes, no numbness, no weakness, no tingling  -Denies dysuria, no hematuria, reports intermittent flank pain   -worse while laying supine     Chief Complaint   Patient presents with   • Annual Exam     HPI  Here for CPE    Social:  -Work: Employed  At [employer]   -Home Life: Feels safe at home, with  and two kids who are 27 an 26 years old  -Interests: She likes to go out to eat, spending time with family, feels home dynamic is happy and safe    Sexual Health:   -LMP: 5 years ago   -PAP: Last exam and result: 1 year ago, normal pap   -Mammo: Last exam and result: in May 2019 normal   -STD Risk: No risk, declines STD testing   -Birth control:  none  -Function: No concerns     Cardiovascular & Metabolic:  -Diet: Bread, hard boiled eggs, rice and protein, limited vegetable intake   -Exercise: Sometimes she walks   -Lipids:   CHOLESTEROL (mg/dl)   Date Value   08/25/2018 222 (H)    ,   HDL  "   Hospital Medicine Service -  Daily Progress Note       SUBJECTIVE   Interval History: Patient reports feeling better.  Still has cough.  Still requires oxygen supplementation and at home she does not have oxygen at baseline.  Discussed her case with our infectious diseases specialist.  Plan to continue antibiotics and check respiratory viral panel.  Updated daughter over the phone.  Modified medications after doing medication reconciliation over the phone with daughter.  Spoke with speech therapist, she has tendency to aspiration, she will need video swallow assessment tomorrow.  Modified diet.  Resume PPI.     OBJECTIVE      Vital signs in last 24 hours:  Temp:  [36.6 °C (97.8 °F)-37.4 °C (99.3 °F)] 36.7 °C (98.1 °F)  Heart Rate:  [] 84  Resp:  [18] 18  BP: (117-135)/(59-65) 117/64    Intake/Output Summary (Last 24 hours) at 2/20/2022 1336  Last data filed at 2/19/2022 2000  Gross per 24 hour   Intake --   Output 1 ml   Net -1 ml       PHYSICAL EXAMINATION      Physical Exam   Visit Vitals  /64 (BP Location: Right upper arm, Patient Position: Lying)   Pulse 84   Temp 36.7 °C (98.1 °F) (Oral)   Resp 18   Ht 1.651 m (5' 5\")   Wt 55.3 kg (122 lb) Comment: bed scale at time of my visit   SpO2 95%   BMI 20.30 kg/m²     General:  Awake, alert.  No acute distress.  HEENT: Sclerae anicteric.  Moist mucus membranes.   Lungs: Bilateral ronchis. No wheezing.   Cardiovascular:  Regular rate and rhythm.  No murmurs.  Abdomen:  Soft, non tender, non distended.  Positive bowel sounds.  :NA  Extremities:  No edema bilaterally.  Neuro: Non-focal.  Skin: Pink  Psych: A/Ox3; full range affect, pleasant and cooperative.     LABS / IMAGING / TELE      Labs  CBC   CBC Results       02/19/22 02/18/22 0424 2130    WBC 3.84 3.96    RBC 3.91 3.93    HGB 11.6 11.7    HCT 33.9 34.5    MCV 86.7 87.8    MCH 29.7 29.8    MCHC 34.2 33.9     171         BMP or CMP   BMP Results       02/19/22 02/18/22 0424 2130    "  134    K 3.4 3.7    Cl 99 96    CO2 26 26    Glucose 112 120    BUN 12 17    Creatinine 0.6 0.9    Calcium 8.6 8.9    Anion Gap 11 12    EGFR >60.0 59.0        CMP Results       02/19/22 02/18/22     0424 2130     134    K 3.4 3.7    Cl 99 96    CO2 26 26    Glucose 112 120    BUN 12 17    Creatinine 0.6 0.9    Calcium 8.6 8.9    Anion Gap 11 12    AST 37 36    ALT 20 21    Albumin 3.8 4.3    EGFR >60.0 59.0           INR          Imaging  No results found.      ECG/Telemetry  I have independently reviewed the telemetry. No events for the last 24 hours.    ASSESSMENT AND PLAN      * Hypoxia  Assessment & Plan  Assessment: 88% on room air at initial evaluation by EMS, subsequently improved with 2 liters of supplemental O2. Has recent sick contacts. No leukocytosis is noted.   Most likely viral infection- bronchopneumonia, bacterial suprainfection is not completely ruled out.    Plan:  Discussed with ID re respiratory viral panel-this is now pending.  Continue O2 supplementation.   SLP evaluation appreciated.  She is at aspiration risk.  Video swallow assessment to follow.  Continue Ceftriaxone/ Azithromycin day #2 of 5.   Incentive spirometry and PEP therapy.      Arthritis  Assessment & Plan  Patient has degenerative arthritis.  Tylenol as needed for pain.  At home uses tramadol.  PDMP verified.  Resumed tramadol as needed for severe pain.    Hyperlipidemia  Assessment & Plan  Continue statin.    GERD (gastroesophageal reflux disease)  Assessment & Plan  Continue PPI.    Primary hypertension  Assessment & Plan  Hold thiazide.   Continue Losartan and Amlodipine.     Hypokalemia  Assessment & Plan  Replace K.   Hold diuretic when hypokalemic.    Elevated troponin  Assessment & Plan  Assessment: Troponin elevated at 15.70. Suspect secondary to demand ischemia in the setting of acute illness.  Not MI.     Bronchopneumonia  Assessment & Plan  For details see #hypoxia  Continue antibiotics w Ceftriaxone/  (mg/dl)   Date Value   2018 64    ,   CHOL/HDL ( )   Date Value   2018 3.5    ,   TRIGLYCERIDE (mg/dl)   Date Value   2018 59    ,   CALCULATED LDL (mg/dl)   Date Value   2018 146 (H)     Not currently on a statin    -Diabetes:   Hemoglobin A1C   Date Value Ref Range Status   2017 5.4 4.5 - 5.6 % Final     Comment:        ----DIABETIC SCREENING---  NON DIABETIC                 <5.7%  INCREASED RISK                5.7-6.4%  DIAGNOSTIC FOR DIABETES      >6.4%     ----DIABETIC CONTROL---     A1C%           eAG mg/dL  6.0            126  6.5            140  7.0            154  7.5            169  8.0            183  8.5            197  9.0            212  9.5            226  10.0           240         ETOH/Tobacco/Illicits:  -ETOH: denies    -Illicits: Denies   -Tobacco: Denies     Immunizations:   -Influenza: (annually): declines, will get at the hospital   -Shingles (>50) Discussed today, will inquire at the pharmacy     Health Maintenance:   -Depression: Denies symptoms, negative PHQ-2  0  -Hearing: No concerns   -Vision: Follows regularly with optometrist, no concerns   -Dental: Sees dentist regularly, no concerns   -Sleep:  no concerns     Colon Cancer Screening:  - Family hx of colon cancer for mom,  at 50   - last C scope: 6 years ago, had polyps   - performed by: Dr. Shagufta Ochoa     Breast Cancer Screening:  - NO FHX of Breast CA  - Last Mammogram: this may  - h/o abnormal mammogram:  No     Cervical Cancer Screening:  - Last Pap Smear: was last year   - h/o abnormal paps: denies     Hepatitis C Screening:  - born between 96[birth year]0289: born in     Chronic Problems:  Patient Active Problem List   Diagnosis   • Abnormal mammogram   • Post-menopausal   • GERD (gastroesophageal reflux disease)   • Overweight   • Colon polyps   • LLQ abdominal pain   • Health maintenance examination   • Generalized abdominal pain   • Epigastric pain       Medications:  Current Outpatient  Medications   Medication Sig Dispense Refill   • omeprazole (PRILOSEC) 40 MG capsule Take 1 capsule by mouth daily. 30 capsule 0     No current facility-administered medications for this visit.        Allergies:  ALLERGIES:  No Known Allergies    Patient's medications, allergies, past medical, surgical, social and family histories were reviewed and updated as appropriate.    ROS: No fevers, chills, sob, chest pain, weakness, bleeding, nausea, vomiting, diarrhea, rashes, sick contacts, recent travel, cough, congestion, sore throat, ear pain, vision changes, headaches, abdominal pain, LA, palpitations, dysuria      Review of Systems   Constitutional: Negative for appetite change, chills, fever and unexpected weight change.   HENT: Negative for congestion, ear pain, hearing loss, rhinorrhea, sore throat and tinnitus.    Eyes: Negative for discharge, redness and visual disturbance.   Respiratory: Negative for apnea, cough, chest tightness, shortness of breath and wheezing.    Cardiovascular: Negative for chest pain, palpitations and leg swelling.   Gastrointestinal: Positive for abdominal pain. Negative for blood in stool, constipation, diarrhea, nausea and vomiting.        Epigastric pain, occasional LLQ pain    Endocrine: Negative for cold intolerance, heat intolerance and polyuria.   Genitourinary: Negative for difficulty urinating, dysuria, flank pain, frequency, hematuria, urgency and vaginal discharge.   Musculoskeletal: Negative for joint swelling and neck pain.   Skin: Negative for color change and rash.   Neurological: Negative for dizziness, speech difficulty, weakness, light-headedness and headaches.   Hematological: Does not bruise/bleed easily.   Psychiatric/Behavioral: Negative for agitation, behavioral problems, confusion, hallucinations, sleep disturbance and suicidal ideas. The patient is not nervous/anxious.        Objective   Visit Vitals  /77   Pulse 75   Temp 97.4 °F (36.3 °C)   Ht 5' 1\"  azithromycin  Add duoneb around the clock when awake.   Swallow evaluation.  Respiratory viral panel pending.  Sputum culture pending.      Other specified hypothyroidism  Assessment & Plan  Continue levothyroxine.        VTE Assessment: [place] VTE Score: 5  VTE Prophylaxis Plan: heparin  Code Status: DNR (A.N.D.)  Estimated Discharge Date: 2/21/2022  Disposition Planning: christo Curtis MD  2/20/2022   Encounter time 36 minutes.  Case discussed with ID.  Case discussed with speech therapist.  Daughter updated.               (1.549 m)   Wt 63 kg (139 lb)   LMP 08/30/2014   BMI 26.26 kg/m²     Physical Exam  HENT:      Head: Normocephalic and atraumatic.      Right Ear: External ear normal.      Left Ear: External ear normal.      Nose: Nose normal.   Eyes:      Conjunctiva/sclera: Conjunctivae normal.      Pupils: Pupils are equal, round, and reactive to light.   Neck:      Musculoskeletal: Normal range of motion and neck supple.      Thyroid: No thyromegaly.   Cardiovascular:      Rate and Rhythm: Normal rate and regular rhythm.      Heart sounds: Normal heart sounds. No murmur. No friction rub. No gallop.    Pulmonary:      Effort: Pulmonary effort is normal. No respiratory distress.      Breath sounds: Normal breath sounds. No stridor. No wheezing or rales.   Chest:      Chest wall: No tenderness.   Abdominal:      General: Bowel sounds are normal. There is no distension.      Palpations: Abdomen is soft. There is no mass.      Tenderness: There is tenderness. There is no guarding or rebound.      Comments: Tenderness with palpation in the epigastrum   Musculoskeletal: Normal range of motion.         General: No deformity.   Lymphadenopathy:      Cervical: No cervical adenopathy.   Skin:     General: Skin is warm and dry.      Capillary Refill: Capillary refill takes less than 2 seconds.   Neurological:      Mental Status: She is alert and oriented to person, place, and time.      Cranial Nerves: No cranial nerve deficit.      Deep Tendon Reflexes: Reflexes normal.   Psychiatric:         Behavior: Behavior normal.         Thought Content: Thought content normal.         Judgement: Judgment normal.         Assessment and Plan:    Assessment     Problem List Items Addressed This Visit        Digestive    GERD (gastroesophageal reflux disease)     -Will prescribe omeprazole 40 mg q daily  -Discussed avoiding triggering foods, no laying supine <3 hours after a meal  -Return in 2 weeks to reassess          Colon polyps     -Referral to  Dr. Mandel provided today          Relevant Orders    SERVICE TO SURGERY COLORECTAL    LLQ abdominal pain     -Ddx includes  etiology vs colon   -UA negative  -Will obtain pelvic TVUS         Relevant Orders    POCT URINALYSIS DIPSTICK    US PELVIS COMPLETE NON OB TRANSVAGINAL    Generalized abdominal pain - Primary    Relevant Orders    POCT URINALYSIS DIPSTICK (Completed)    Epigastric pain    Relevant Orders    LIPASE       Other    Health maintenance examination     -Discussed heart healthy diet   -Discussed minimum 150 minutes vigorous intensity exercise  -Will obtain annual influenza at work  -Discussed shingrix vaccine  -Return in 1 year for CPE, sooner with acute concerns          Relevant Orders    CBC WITH DIFFERENTIAL    COMPREHENSIVE METABOLIC PANEL    LIPID PANEL WITH REFLEX    GLYCOHEMOGLOBIN    THYROID STIMULATING HORMONE          Women's Health:  -Paps per routine     Colorectal Cancer Screening :  -Referral provided for colonoscopy    Cardiovascular & Metabolic Health:  -Obesity Counselling: Counselling regarding healthy lifestyle, diet and exercise provided.   -Exercise: Continue to work on getting at least 30 minutes of physical activity daily.    -Diet: Continue working on eating diet high in fiber and low in fat. Increase the volume of bright colorful fruits and vegetables. Reduce salt intake to less than 2000mg a day. Reduce red meat and fried foods to \"special occasions\" only. Continue drinking ample water daily.     Immunizations:   -Highly recommend annual influenza vaccination  -Highly recommend zoster (shingles) vaccination for people older than 60 in order to prevent shingles    Other Prevention:  -Dental: Continue to follow with dentist.   -Vision: Continue to follow with eye doctor.  - Injury prevention counseling completed: seat belts, helmets, smoke detectors, safe storage of fire-arms, internet safety & sun safety discussed  - counseled on safe sex practices  - adequate calcium  and vitamin D intake reviewed and recommended    Health Maintenance Summary     Shingles Vaccine (1 of 2)  Overdue since 8/4/2019    Colorectal Cancer Screening-Colonoscopy (Every 10 Years)  Due since 8/4/2019    Influenza Vaccine (1)  Next due on 9/1/2019    Breast Cancer Screening (Yearly)  Next due on 5/28/2020    Depression Screening (Yearly)  Next due on 8/30/2020    Cervical Cancer Screening HPV CO-Testing (Every 5 Years)  Next due on 8/21/2023    DTaP/Tdap/Td Vaccine (2 - Td)  Next due on 8/21/2028    Meningococcal Vaccine   Aged Out    Pneumococcal Vaccine 0-64   Aged Out        -Return to clinic in one year for annual exam  -Return to clinic as needed if new issues arise    .Patient was discussed with supervising attending, Dr. Shravan Multani.    .Mónica Flores D.O., M.Sc.  PGY3 Family Medicine  Page via [employer]

## 2022-02-20 NOTE — CONSULTS
Infectious Disease Consult Note    Patient Name: Merced Robin  MR#: [id number]  : 1931  Admission Date: 2022  Consult Date: 22 9:42 AM   Consultant: Abdifatah Becerra MD    Reason for Consult: micro lab requested respiratory viral panel due to abnormal findings on the sputum cx. Pls approve resp viral panel.  Referring Provider: Nevaeh Curtis      History of Present Illness     Merced Robin is a 89 y.o. female with PMH of GERD, hypothyroidism, HTN, breast ca who was admitted on 2022 with cough and generalized weakness which started one week ago with fever in the setting of recent sick contact with her grandson two weeks ago. She denies any chest pain, abdominal pain, N/V/D, dysuria, or skin rashes but has some dyspnea. On admission, she was afebrile without leukocytosis. CTA chest showed Multifocal airspace disease greatest in the left lower lobe and peribronchial thickening on the left. Blood cx are showing no growth to date and sputum cx is pending. She was started on ceftriaxone and azithromycin after mycoplasma and chlamydia PCR were negative and legionella Ag is pending.     Outside records were reviewed.    Allergies:   Allergies   Allergen Reactions   • Sulfa (Sulfonamide Antibiotics) Rash       Medical History:   Past Medical History:   Diagnosis Date   • Breast cancer (CMS/HCC)    • Hypertension    • Hypothyroidism        Surgical History:   Past Surgical History:   Procedure Laterality Date   • BREAST LUMPECTOMY Bilateral    • HIP SURGERY Right    • REPLACEMENT TOTAL KNEE Right    • SHOULDER SURGERY Bilateral        Social History:   Social History     Socioeconomic History   • Marital status: Single     Spouse name: None   • Number of children: None   • Years of education: None   • Highest education level: None   Occupational History   • None   Tobacco Use   • Smoking status: Former Smoker   • Smokeless tobacco: Never Used   • Tobacco comment: smoked ages [age]   Vaping Use   •  Vaping Use: Never used   Substance and Sexual Activity   • Alcohol use: Yes     Comment: rare   • Drug use: Not Currently   • Sexual activity: None   Other Topics Concern   • None   Social History Narrative   • None     Social Determinants of Health     Financial Resource Strain: Not on file   Food Insecurity: No Food Insecurity   • Worried About Running Out of Food in the Last Year: Never true   • Ran Out of Food in the Last Year: Never true   Transportation Needs: Not on file   Physical Activity: Not on file   Stress: Not on file   Social Connections: Not on file   Intimate Partner Violence: Not on file   Housing Stability: Not on file          Travel Exposure:   Travel and Exposure Screening      Most Recent Value   Travel Screening    Overnight hospitalization outside the [place] in the last year? No Filed On: 02/18/2022 2123   Exposure Screening    Symptoms         Family History: History reviewed. No pertinent family history.    Review of Systems    All other systems reviewed and negative except as noted in the HPI.    Medications:    Current IP Meds (From admission, onward)        Frequency     potassium bicarbonate (EFFER-K) disintegrating tablet 40 mEq         Once     potassium chloride (KLOR-CON) tablet extended release 40 mEq  Status:  Discontinued         Once     potassium bicarbonate (EFFER-K) disintegrating tablet 40 mEq         Once     azithromycin (ZITHROMAX) IVPB 500 mg in 250 mL NSS vial in bag         Every 24 hours interval     cefTRIAXone (ROCEPHIN) IVPB 1 g in 100 mL NSS vial in bag         Every 24 hours interval     ipratropium-albuteroL (DUO-NEB) 0.5-2.5 mg/3 mL nebulizer solution 3 mL         Every 6 hours     potassium bicarbonate (EFFER-K) disintegrating tablet 40 mEq         Once     amLODIPine (NORVASC) tablet 5 mg         Daily     cyanocobalamin (VITAMIN B12) tablet 100 mcg         Daily     losartan (COZAAR) tablet 50 mg         Daily     PARoxetine (PAXIL) tablet 20 mg         Daily  "    rosuvastatin (CRESTOR) tablet 10 mg         Daily     guaiFENesin (MUCINEX) 12 hr ER tablet 1,200 mg         2 times daily     levothyroxine (SYNTHROID) tablet 75 mcg         Daily (6:30a)     heparin (porcine) 5,000 unit/mL injection 5,000 Units  (Assessed at increased VTE risk (Padua score greater than or equal to 4))         Every 8 hours     acetaminophen (TYLENOL) tablet 650 mg  (Analgesics - Acetaminophen pain or fever)         Every 4 hours PRN     albuterol nebulizer solution 2.5 mg  Status:  Discontinued         Every 4 hours PRN          Anti-infectives (From admission, onward)    Start     Dose/Rate Route Frequency Ordered Stop    02/20/22 0000  azithromycin (ZITHROMAX) IVPB 500 mg in 250 mL NSS vial in bag         500 mg  250 mL/hr over 60 Minutes intravenous Every 24 hours interval 02/19/22 0031 02/22/22 2359    02/19/22 2300  cefTRIAXone (ROCEPHIN) IVPB 1 g in 100 mL NSS vial in bag         1 g  200 mL/hr over 30 Minutes intravenous Every 24 hours interval 02/19/22 0031              Objective     Vital Signs:    Patient Vitals for the past 72 hrs:   BP Temp Temp src Pulse Resp SpO2 Height Weight   02/20/22 0759 135/60 36.6 °C (97.8 °F) Oral 91 18 97 % -- --   02/20/22 0500 (!) 131/59 36.8 °C (98.2 °F) Temporal -- -- -- -- --   02/19/22 2345 -- 36.8 °C (98.2 °F) Temporal 87 -- -- -- --   02/19/22 2200 131/64 36.9 °C (98.5 °F) Oral 95 18 93 % -- --   02/19/22 2000 -- -- -- (!) 103 -- -- -- --   02/19/22 1952 133/65 36.9 °C (98.4 °F) Oral (!) 105 18 96 % -- --   02/19/22 1553 -- -- -- 85 -- -- -- --   02/19/22 1456 122/62 37.4 °C (99.3 °F) -- 87 18 95 % -- --   02/19/22 1425 -- -- -- -- -- -- 1.651 m (5' 5\") 55.3 kg (122 lb)   02/19/22 1200 (!) 105/56 36.6 °C (97.9 °F) -- 90 18 95 % -- --   02/19/22 0800 (!) 127/58 36.6 °C (97.9 °F) -- 83 20 96 % -- --   02/19/22 0745 -- -- -- 81 -- -- -- --   02/19/22 0600 -- -- -- -- -- -- -- 54.7 kg (120 lb 9.6 oz)   02/19/22 0215 -- -- -- -- -- -- 1.651 m (5' 5\") " 57.5 kg (126 lb 12.2 oz)   22 0209 (!) 144/68 37.5 °C (99.5 °F) Oral 95 (!) 26 93 % -- --   22 0135 -- -- -- 92 (!) 26 96 % -- --   22 2334 -- -- -- 94 (!) 24 96 % -- --   22 2312 (!) 155/75 -- -- 94 (!) 32 94 % -- --   220 -- -- -- 98 20 97 % -- --   22 (!) 173/82 37.7 °C (99.9 °F) Tympanic 98 20 98 % -- --       Temp (72hrs), Av °C (98.6 °F), Min:36.6 °C (97.8 °F), Max:37.7 °C (99.9 °F)      Physical Exam:    General appearance: alert and cooperative  Head: normocephalic, without obvious abnormality, atraumatic  Eyes: anicteric sclera  Neck: supple  Lungs: crackles bilateral  Heart: regular rate and rhythm  Abdomen: soft, non-tender  Extremities: edema none  Joints: no swelling  Skin: no rashes  Neurologic: Grossly normal    Lines, Drains, Airways, Wounds:  Peripheral IV (Adult) 22 Right Wrist (Active)   Number of days: 2       Peripheral IV (Adult) 22 Left Antecubital (Active)   Number of days: 2       Labs:    CBC Results       22     0601 0424 2130    WBC 3.53 3.84 3.96    RBC 3.68 3.91 3.93    HGB 11.2 11.6 11.7    HCT 32.3 33.9 34.5    MCV 87.8 86.7 87.8    MCH 30.4 29.7 29.8    MCHC 34.7 34.2 33.9     159 171      BMP Results       22     0601 0424 2130     136 134    K 3.3 3.4 3.7    Cl 98 99 96    CO2 28 26 26    Glucose 106 112 120    BUN 16 12 17    Creatinine 0.5 0.6 0.9    Calcium 8.4 8.6 8.9    Anion Gap 9 11 12    EGFR >60.0 >60.0 59.0      PT/PTT Results    No lab values to display.     UA Results       22    Color Yellow    Clarity Clear    Glucose Negative    Bilirubin Negative    Ketones Trace    Sp Grav 1.016    Blood +1    Ph 6.0    Protein Trace    Urobilinogen 0.2    Nitrite Negative    Leuk Est Negative    WBC 0 TO 3    RBC 0 TO 4    Bacteria None Seen         Comment for Ketones at  on 22: Free sulfhydryl drugs such as Mesna, Capoten, and  Acetylcysteine (Mucomyst) may cause false positive ketonuria.    Comment for Blood at 2137 on 02/18/22: The sensitivity of the occult blood test is equivalent to approximately 4 intact RBC/HPF.    Comment for Protein at 2137 on 02/18/22: Trace False Positive Protein can be seen with alkaline or highly buffered urines or urine with high specific gravity. Suggest clinical correlation.    Comment for Leuk Est at 2137 on 02/18/22: Results can be falsely negative due to high specific gravity, some antibiotics, glucose >3 g/dl, or WBC other than neutrophils.      Lactate Results       02/18/22 2130    Lactate 0.9          Microbiology Results     Procedure Component Value Units Date/Time    Blood Culture Blood, Venous [[id number]]  (Normal) Collected: 02/18/22 2130    Specimen: Blood, Venous Updated: 02/20/22 0000     Culture No growth at 18-24 hours    Blood Culture Blood, Venous [[id number]]  (Normal) Collected: 02/18/22 2130    Specimen: Blood, Venous Updated: 02/20/22 0000     Culture No growth at 18-24 hours    SARS-CoV-2 (COVID-19), PCR Nasopharynx [[id number]]  (Normal) Collected: 02/18/22 2128    Specimen: Nasopharyngeal Swab from Nasopharynx Updated: 02/18/22 2225    Narrative:      The following orders were created for panel order SARS-CoV-2 (COVID-19), PCR Nasopharynx.  Procedure                               Abnormality         Status                     ---------                               -----------         ------                     SARS-COV-2 (COVID-19)/ F...[[id number]]  Normal              Final result                 Please view results for these tests on the individual orders.    SARS-COV-2 (COVID-19)/ FLU A/B, AND RSV, PCR Nasopharynx [[id number]]  (Normal) Collected: 02/18/22 2128    Specimen: Nasopharyngeal Swab from Nasopharynx Updated: 02/18/22 2225     SARS-CoV-2 (COVID-19) Negative     Influenza A Negative     Influenza B Negative     Respiratory Syncytial Virus Negative    Mycoplasma  pneumoniae DNA, PCR Nasopharynx [[id number]]  (Normal) Collected: 02/18/22 2128    Specimen: Nasopharyngeal Swab from Nasopharynx Updated: 02/19/22 0852     Mycoplasma pneumo DNA Negative    Chlamydia pneumoniae DNA Qual, PCR Nasopharynx [[id number]]  (Normal) Collected: 02/18/22 2128    Specimen: Nasopharyngeal Swab from Nasopharynx Updated: 02/19/22 0852     Chlamydia pneumoniae Negative          Pathology Results     ** No results found for the last 720 hours. **          Echo:         Imaging:    Radiology Imaging    XR CHEST 1 VW    Narrative  Chest x-ray    CLINICAL HISTORY: Shortness of breath.    COMPARISON: None.    COMMENT: Portable AP upright examination of the chest obtained.  Cardiomediastinal silhouette and pulmonary vascularity within normal limits.  Lungs are grossly clear.  Trachea is midline.  No pleural fluid or air.  Uncoiling of the thoracic aorta.  Mild scoliosis of the thoracic spine.  Bilateral shoulder arthroplasties.    --    Impression  No acute cardiopulmonary process.      Assessment     1. Pneumonia - possibly viral etiology in the setting of recent sick contact. CTA chest on independent review showed multifocal airspace disease greatest in the left lower lobe and peribronchial thickening on the left. Blood cx are showing no growth to date and sputum cx and legionella Ag are pending after mycoplasma and chlamydia PCR were negative and pt remains afebrile without leukocytosis.        Plan     1. Continue ceftriaxone and azithromycin while waiting for legionella Ag and sputum cx and check RVP.

## 2022-02-20 NOTE — ASSESSMENT & PLAN NOTE
Patient has degenerative arthritis.  Tylenol as needed for pain.  At home uses tramadol.  PDMP verified.  Resumed tramadol as needed for severe pain.

## 2022-02-21 ENCOUNTER — APPOINTMENT (INPATIENT)
Dept: RADIOLOGY | Facility: HOSPITAL | Age: 87
DRG: 194 | End: 2022-02-21
Attending: HOSPITALIST
Payer: MEDICARE

## 2022-02-21 PROBLEM — J12.3 HUMAN METAPNEUMOVIRUS PNEUMONIA: Status: ACTIVE | Noted: 2022-02-21

## 2022-02-21 PROBLEM — E87.6 HYPOKALEMIA: Status: RESOLVED | Noted: 2022-02-19 | Resolved: 2022-02-21

## 2022-02-21 LAB
ANION GAP SERPL CALC-SCNC: 11 MEQ/L (ref 3–15)
BUN SERPL-MCNC: 21 MG/DL (ref 8–20)
CALCIUM SERPL-MCNC: 8.6 MG/DL (ref 8.9–10.3)
CHLORIDE SERPL-SCNC: 96 MEQ/L (ref 98–109)
CO2 SERPL-SCNC: 29 MEQ/L (ref 22–32)
CREAT SERPL-MCNC: 0.6 MG/DL (ref 0.6–1.1)
GFR SERPL CREATININE-BSD FRML MDRD: >60 ML/MIN/1.73M*2
GLUCOSE SERPL-MCNC: 100 MG/DL (ref 70–99)
L PNEUMO1 AG UR QL: NEGATIVE
MAGNESIUM SERPL-MCNC: 2 MG/DL (ref 1.8–2.5)
POTASSIUM SERPL-SCNC: 4.1 MEQ/L (ref 3.6–5.1)
SODIUM SERPL-SCNC: 136 MEQ/L (ref 136–144)

## 2022-02-21 PROCEDURE — 74230 X-RAY XM SWLNG FUNCJ C+: CPT

## 2022-02-21 PROCEDURE — 36415 COLL VENOUS BLD VENIPUNCTURE: CPT | Performed by: HOSPITALIST

## 2022-02-21 PROCEDURE — 80048 BASIC METABOLIC PNL TOTAL CA: CPT | Performed by: HOSPITALIST

## 2022-02-21 PROCEDURE — 63600000 HC DRUGS/DETAIL CODE: Performed by: HOSPITALIST

## 2022-02-21 PROCEDURE — 99233 SBSQ HOSP IP/OBS HIGH 50: CPT | Performed by: HOSPITALIST

## 2022-02-21 PROCEDURE — 63700000 HC SELF-ADMINISTRABLE DRUG: Performed by: HOSPITALIST

## 2022-02-21 PROCEDURE — 83735 ASSAY OF MAGNESIUM: CPT | Performed by: HOSPITALIST

## 2022-02-21 PROCEDURE — 25000000 HC PHARMACY GENERAL: Performed by: HOSPITALIST

## 2022-02-21 PROCEDURE — 92611 MOTION FLUOROSCOPY/SWALLOW: CPT | Mod: GN

## 2022-02-21 PROCEDURE — 200200 PR NO CHARGE: Performed by: HOSPITALIST

## 2022-02-21 PROCEDURE — 20600000 HC ROOM AND CARE INTERMEDIATE/TELEMETRY

## 2022-02-21 PROCEDURE — 25800000 HC PHARMACY IV SOLUTIONS: Performed by: HOSPITALIST

## 2022-02-21 RX ADMIN — IPRATROPIUM BROMIDE AND ALBUTEROL SULFATE 3 ML: .5; 3 SOLUTION RESPIRATORY (INHALATION) at 00:33

## 2022-02-21 RX ADMIN — HEPARIN SODIUM 5000 UNITS: 5000 INJECTION, SOLUTION INTRAVENOUS; SUBCUTANEOUS at 15:53

## 2022-02-21 RX ADMIN — Medication 100 MCG: at 08:48

## 2022-02-21 RX ADMIN — ROSUVASTATIN CALCIUM 10 MG: 10 TABLET, FILM COATED ORAL at 08:48

## 2022-02-21 RX ADMIN — PAROXETINE 20 MG: 20 TABLET, FILM COATED ORAL at 08:48

## 2022-02-21 RX ADMIN — LOSARTAN POTASSIUM 50 MG: 50 TABLET, FILM COATED ORAL at 08:48

## 2022-02-21 RX ADMIN — GUAIFENESIN 1200 MG: 600 TABLET, EXTENDED RELEASE ORAL at 19:49

## 2022-02-21 RX ADMIN — HEPARIN SODIUM 5000 UNITS: 5000 INJECTION, SOLUTION INTRAVENOUS; SUBCUTANEOUS at 05:55

## 2022-02-21 RX ADMIN — AZITHROMYCIN MONOHYDRATE 500 MG: 500 INJECTION, POWDER, LYOPHILIZED, FOR SOLUTION INTRAVENOUS at 00:33

## 2022-02-21 RX ADMIN — AMLODIPINE BESYLATE 5 MG: 5 TABLET ORAL at 08:49

## 2022-02-21 RX ADMIN — IPRATROPIUM BROMIDE AND ALBUTEROL SULFATE 3 ML: .5; 3 SOLUTION RESPIRATORY (INHALATION) at 05:56

## 2022-02-21 RX ADMIN — LEVOTHYROXINE SODIUM 75 MCG: 75 TABLET ORAL at 05:56

## 2022-02-21 RX ADMIN — PANTOPRAZOLE SODIUM 40 MG: 40 TABLET, DELAYED RELEASE ORAL at 08:48

## 2022-02-21 RX ADMIN — HEPARIN SODIUM 5000 UNITS: 5000 INJECTION, SOLUTION INTRAVENOUS; SUBCUTANEOUS at 21:40

## 2022-02-21 RX ADMIN — IPRATROPIUM BROMIDE AND ALBUTEROL SULFATE 3 ML: .5; 3 SOLUTION RESPIRATORY (INHALATION) at 12:12

## 2022-02-21 RX ADMIN — GUAIFENESIN 1200 MG: 600 TABLET, EXTENDED RELEASE ORAL at 08:48

## 2022-02-21 RX ADMIN — IPRATROPIUM BROMIDE AND ALBUTEROL SULFATE 3 ML: .5; 3 SOLUTION RESPIRATORY (INHALATION) at 17:49

## 2022-02-21 NOTE — PROCEDURES
02/21/22 Video swallow study was completed. Please refer to the imaging section for full report and recommendations.        Results for orders placed during the hospital encounter of 02/18/22    FLUOROSCOPY VIDEO SWALLOW WITH SPEECH    Impression  1. Mild oral dysphagia.  2. Moderate pharyngeal dysphagia with aspiration of thin liquids compensated for  using the chin tuck swallow strategy.    Dr. Leonardo Ochoa was present for this examination.  The undersigned radiologist agrees only with the radiographic findings.  Specific swallowing recommendations are solely the purview of the Speech  Pathology Division.  Maddie De Los Santos MA, CF-SLP in conjunction with Clemencia Levi MA, CCC-SLP/L

## 2022-02-21 NOTE — PROGRESS NOTES
Infectious Disease Progress Note    Patient Name: Merced Robin  MR#: [id number]  : 1931  Admission Date: 2022  Date: 22   Time: 1:24 PM   Author: Chai Ferrari MD    OBJECTIVE:  Viral PCR panel results noted  White count of 3.5  The patient has no fever  Antibiotics:        ROS  Negative  Vital Signs:    Temp:  [36.6 °C (97.8 °F)-37 °C (98.6 °F)] 37 °C (98.6 °F)  Heart Rate:  [72-93] 93  Resp:  [16-18] 16  BP: (110-135)/(59-68) 135/63    Temp (72hrs), Av.9 °C (98.4 °F), Min:36.6 °C (97.8 °F), Max:37.7 °C (99.9 °F)      Physical Exam    Gen: Aox3  HEENT: OP clear  Neck: Supple  LAD: No cervical LAD  Lungs: Coarse breath sounds  CV: RRR no murmurs  Abd: Soft NTND +BS  Ext: No c/c/e  Skin: no rash  Neuro: II-XII intact        Lines, Drains, Airways, Wounds:  Peripheral IV (Adult) 22 Right Wrist (Active)   Number of days: 3       Peripheral IV (Adult) 22 Left Antecubital (Active)   Number of days: 3       Labs:    Lab Results   Component Value Date    WBC 3.53 (L) 2022    HGB 11.2 (L) 2022    HCT 32.3 (L) 2022    MCV 87.8 2022     2022     Lab Results   Component Value Date    GLUCOSE 100 (H) 2022    CALCIUM 8.6 (L) 2022     2022    K 4.1 2022    CO2 29 2022    CL 96 (L) 2022    BUN 21 (H) 2022    CREATININE 0.6 2022     Lab Results   Component Value Date    ALT 20 2022    AST 37 2022    ALKPHOS 59 2022    BILITOT 1.0 2022         Patient Active Problem List   Diagnosis Code   • Hx of total knee arthroplasty, right Z96.651   • History of breast cancer Z85.3   • Other specified hypothyroidism E03.8   • Hypoxia R09.02   • Bronchopneumonia J18.0   • Elevated troponin R77.8   • Hypokalemia E87.6   • Primary hypertension I10   • GERD (gastroesophageal reflux disease) K21.9   • Hyperlipidemia E78.5   • Arthritis M19.90   • Human metapneumovirus pneumonia J12.3     Human  metapneumovirus pneumonia  Assessment & Plan  Viral pneumonia  Recommend supportive care  No need for antibiotics will discontinue          Chai Ferrari MD  2/21/20221:24 PM

## 2022-02-21 NOTE — PATIENT CARE CONFERENCE
Care Progression Rounds Note  Date: 2/21/2022  Time: 11:22 AM     Patient Name: Merced Robin     Medical Record Number: [id number]   YOB: 1931  Sex: Female      Room/Bed: 0308    Admitting Diagnosis: Hypoxia [R09.02]  Pneumonia due to infectious organism, unspecified laterality, unspecified part of lung [J18.9]   Admit Date/Time: 2/18/2022  9:08 PM    Primary Diagnosis: Hypoxia  Principal Problem: Hypoxia    GMLOS: pending  Anticipated Discharge Date: 2/22/2022    AM-PAC:  Mobility Score:      Discharge Planning:  Anticipated Discharge Disposition: home with home health    Barriers to Discharge:  Medical issues not resolved    Comments:  pending video swallow; weaned to 2 liters oxygen    Participants:  pharmacy,physical therapy,,nursing,occupational therapy,social work/services

## 2022-02-21 NOTE — PLAN OF CARE
Problem: Adult Inpatient Plan of Care  Goal: Plan of Care Review  Flowsheets (Taken 2/21/2022 1233)  Plan of Care Reviewed With: daughter  Outcome Summary: SW s/w pt's dtr Leta to complete assessment and begin dispo planning discussion.     Problem: Adult Inpatient Plan of Care  Goal: Readiness for Transition of Care  Intervention: Mutually Develop Transition Plan  Flowsheets (Taken 2/21/2022 1233)  Anticipated Discharge Disposition: skilled nursing facility  Assistive Device/Animal Currently Used at Home: (chair glide to 2nd floor)   commode, 3-in-1   cane, straight  Outpatient/Agency/Support Group Needs: skilled nursing facility  Readmission Within the Last 30 Days: no previous admission in last 30 days  Patient/Family Anticipated Services at Transition: skilled nursing  Patient/Family Anticipates Transition to: inpatient rehabilitation facility  Transportation Anticipated: (depending if pt can transfer/ambulate)   health plan transportation   family or friend will provide  Concerns to be Addressed:   care coordination/care conferences   discharge planning  Patient's Choice of Community Agency(s): Home Care:[facility] [place] and believes pt will need to go to SNF d/t increased difficulty with transfers  Offered/Gave Vendor List: yes         Home set up:  Lives with daughter in a 2SH with 5STE   2nd floor BR/BA   There is a first floor bathroom.    PLOF:  Ambulation -  Cane for distances  ADLs - independent    HHA -  none  DME: commode, cane, booster recliner, chair glide (walker but doesn't use)      ISABEL confirmed the following:  Address & emergency contact  PCP - uploaded Chica Sommer   Pharmacy - [facility]   POA - none  ACP -none  COVID Vaccination:   Moderna 3/5/2021 , 1/28/2021       Dispo preferences:  Transport: daughter vs [facility]S depending upon pt's ability   Home Care:had [facility] in the past  [facility]    Daughter is a physician and believes pt will need to go to SNF d/t increased difficulty with transfers    SW will remain available for emotional support and dispo planning. -Zena Argueta LCSW.

## 2022-02-22 LAB — MICROORGANISM SPEC CULT: NORMAL

## 2022-02-22 PROCEDURE — 63700000 HC SELF-ADMINISTRABLE DRUG: Performed by: HOSPITALIST

## 2022-02-22 PROCEDURE — 99232 SBSQ HOSP IP/OBS MODERATE 35: CPT | Performed by: HOSPITALIST

## 2022-02-22 PROCEDURE — 97166 OT EVAL MOD COMPLEX 45 MIN: CPT | Mod: GO

## 2022-02-22 PROCEDURE — 97162 PT EVAL MOD COMPLEX 30 MIN: CPT | Mod: GP

## 2022-02-22 PROCEDURE — 97530 THERAPEUTIC ACTIVITIES: CPT | Mod: GP

## 2022-02-22 PROCEDURE — 92526 ORAL FUNCTION THERAPY: CPT | Mod: GN

## 2022-02-22 PROCEDURE — 63600000 HC DRUGS/DETAIL CODE: Performed by: HOSPITALIST

## 2022-02-22 PROCEDURE — 20600000 HC ROOM AND CARE INTERMEDIATE/TELEMETRY

## 2022-02-22 PROCEDURE — 25000000 HC PHARMACY GENERAL: Performed by: HOSPITALIST

## 2022-02-22 RX ADMIN — HEPARIN SODIUM 5000 UNITS: 5000 INJECTION, SOLUTION INTRAVENOUS; SUBCUTANEOUS at 22:20

## 2022-02-22 RX ADMIN — PAROXETINE 20 MG: 20 TABLET, FILM COATED ORAL at 09:15

## 2022-02-22 RX ADMIN — Medication 100 MCG: at 09:15

## 2022-02-22 RX ADMIN — HEPARIN SODIUM 5000 UNITS: 5000 INJECTION, SOLUTION INTRAVENOUS; SUBCUTANEOUS at 15:10

## 2022-02-22 RX ADMIN — IPRATROPIUM BROMIDE AND ALBUTEROL SULFATE 3 ML: .5; 3 SOLUTION RESPIRATORY (INHALATION) at 12:00

## 2022-02-22 RX ADMIN — IPRATROPIUM BROMIDE AND ALBUTEROL SULFATE 3 ML: .5; 3 SOLUTION RESPIRATORY (INHALATION) at 18:09

## 2022-02-22 RX ADMIN — GUAIFENESIN 1200 MG: 600 TABLET, EXTENDED RELEASE ORAL at 20:18

## 2022-02-22 RX ADMIN — HEPARIN SODIUM 5000 UNITS: 5000 INJECTION, SOLUTION INTRAVENOUS; SUBCUTANEOUS at 05:46

## 2022-02-22 RX ADMIN — GUAIFENESIN 1200 MG: 600 TABLET, EXTENDED RELEASE ORAL at 09:15

## 2022-02-22 RX ADMIN — PANTOPRAZOLE SODIUM 40 MG: 40 TABLET, DELAYED RELEASE ORAL at 09:15

## 2022-02-22 RX ADMIN — ROSUVASTATIN CALCIUM 10 MG: 10 TABLET, FILM COATED ORAL at 09:15

## 2022-02-22 RX ADMIN — IPRATROPIUM BROMIDE AND ALBUTEROL SULFATE 3 ML: .5; 3 SOLUTION RESPIRATORY (INHALATION) at 23:46

## 2022-02-22 RX ADMIN — AMLODIPINE BESYLATE 5 MG: 5 TABLET ORAL at 09:15

## 2022-02-22 RX ADMIN — LOSARTAN POTASSIUM 50 MG: 50 TABLET, FILM COATED ORAL at 09:15

## 2022-02-22 RX ADMIN — LEVOTHYROXINE SODIUM 75 MCG: 75 TABLET ORAL at 05:46

## 2022-02-22 RX ADMIN — IPRATROPIUM BROMIDE AND ALBUTEROL SULFATE 3 ML: .5; 3 SOLUTION RESPIRATORY (INHALATION) at 05:45

## 2022-02-22 ASSESSMENT — COGNITIVE AND FUNCTIONAL STATUS - GENERAL
STANDING UP FROM CHAIR USING ARMS: 3 - A LITTLE
AFFECT: WFL
TAKING CARE OF COMPLICATED TASKS: 4 - NONE
MOVING TO AND FROM BED TO CHAIR: 3 - A LITTLE
HELP NEEDED FOR PERSONAL GROOMING: 3 - A LITTLE
CLIMB 3 TO 5 STEPS WITH RAILING: 3 - A LITTLE
REMEMBERING 5 ERRANDS WITH NO LIST: 4 - NONE
DRESSING REGULAR UPPER BODY CLOTHING: 3 - A LITTLE
DRESSING REGULAR LOWER BODY CLOTHING: 3 - A LITTLE
AFFECT: WFL
REMEMBERING WHERE THINGS ARE: 4 - NONE
TOILETING: 3 - A LITTLE
FOLLOWS FAMILIAR CONVERSATION: 4 - NONE
REMEMBERING TO TAKE MEDICATION: 4 - NONE
EATING MEALS: 4 - NONE
UNDERSTANDING 10 TO 15 MIN SPEECH: 4 - NONE
WALKING IN HOSPITAL ROOM: 3 - A LITTLE
HELP NEEDED FOR BATHING: 3 - A LITTLE

## 2022-02-22 NOTE — PROGRESS NOTES
Patient: Merced Robin  Location:  [facility] 3B 0308  MRN:  [id number]  Today's date:  2/22/2022    Merced is a 89 y.o. female admitted on 2/18/2022 with Hypoxia [R09.02]  Pneumonia due to infectious organism, unspecified laterality, unspecified part of lung [J18.9]. Principal problem is Hypoxia.    Past Medical History  Merced has a past medical history of Breast cancer (CMS/HCC), Hypertension, and Hypothyroidism.    History of Present Illness   Pt consumes a regular diet with thin liquids at baseline.  Presented to ER on 2/18/2022 with generalized malaise, cough and fever.  Patient reports that her grandson was recently ill with a cough and fever and subsequently she started to have the same symptoms. She states that her symptoms consist of cough, congestion, fevers, chills, fatigue, lack of appetite, generalized malaise and shortness of breath with exertion. She states that her daughter noted that the patient was having shortness of breath at rest and that she developed a fever of Tmax 103 today leading to her to come to the ER for further evaluation. It was noted that the patient was hypoxic too 88% on arrival of EMS and improved with supplemental O2 via nasal cannula at 2 L. Diagnosed with viral pneumonia.    Patient was received supine in bed. Patient was left at end of session OOB in chair with call bell in reach and alarm on. RN aware of PT encounter.     Pt on room air during session. O2 level dropped to 91% on RA but quickly recovered with 30 sec rest.       PT Vitals    Date/Time Pulse SpO2 Pt Activity O2 Therapy BP [facility]   02/22/22 0957 84 93 % At rest None (Room air) 146/69 DB   02/22/22 1015 91 94 % At rest None (Room air) 137/72 DB      PT Pain    Date/Time Pain Type Rating: Rest Rating: Activity [facility]   02/22/22 0957 Pain Assessment 0 - no pain 0 - no pain DB          Prior Living Environment      Most Recent Value   People in Home child(gricelda), adult   Current Living Arrangements home   Home  Accessibility stair glide, stairs to enter home (Group)   Living Environment Comment lives in 3 story home with daughter, 5 [address] with BL rail, 2nf floor bed/ bath with powder room on 1st floor   Number of Stairs, Main Entrance 5   Stair Railings, Main Entrance railings on both sides of stairs        Prior Level of Function      Most Recent Value   Ambulation assistive equipment   Transferring assistive equipment   Toileting independent   Bathing independent   Dressing independent   Eating independent   Communication understands/communicates without difficulty   Swallowing swallows foods/liquids without difficulty   Baseline Diet/Method of Nutritional Intake regular, thin liquids   Past History of Dysphagia Pt c/o occasional regurgitation of food and globus sensation at the level of the suprasternal notch   Prior Level of Function Comment Pt has AD at home, however, does not use frequently   Assistive Device Currently Used at Home commode, 3-in-1, cane, straight, stair glide, walker, front-wheeled           PT Evaluation and Treatment - 02/22/22 0956        PT Time Calculation    Start Time 0956     Stop Time 1024     Time Calculation (min) 28 min        Session Details    Document Type initial evaluation     Mode of Treatment physical therapy        General Information    Patient Profile Reviewed yes     Onset of Illness/Injury or Date of Surgery 02/18/22     General Observations of Patient pt received supine in bed     Existing Precautions/Restrictions aspiration;fall        Living Environment    Name(s) of People in Home daughter     Primary Care Provided by self        Cognition/Psychosocial    Affect/Mental Status (Cognition) WFL     Orientation Status (Cognition) oriented x 4     Follows Commands (Cognition) WFL     Cognitive Function WFL     Comment, Cognition pleasant and cooperative        Sensory Assessment (Somatosensory)    Sensory Assessment (Somatosensory) bilateral LE;sensation intact        Range of  Motion (ROM)    Range of Motion bilateral lower extremities;ROM is WFL        Strength (Manual Muscle Testing)    Hip, Left (Strength) 3+/5     Knee, Left (Strength) 3+/5     Ankle, Left (Strength) 3+/5     Hip, Right (Strength) 3+/5     Knee, Right (Strength) 3+/5     Ankle, Right (Strength) 3+/5        Bed Mobility    [place], Supine to Sit close supervision     Assistive Device head of bed elevated;bed rails     Comment (Bed Mobility) increased time; used bed rails from elevated HOB; per pt, she has mechanical bed at home        Sit to Stand Transfer    [place], Sit to Stand Transfer minimum assist (75% or more patient effort);1 person assist     Verbal Cues safety     Assistive Device walker, front-wheeled     Comment pt with severely flexed posture and inability to complete task without assit; VCs to stand and resume upright via activiating glutes and extending knees        Stand to Sit Transfer    [place], Stand to Sit Transfer minimum assist (75% or more patient effort);1 person assist     Verbal Cues safety     Assistive Device other (see comments)   HHAx1    Comment fair eccentric lowering        Gait Training    [place], Gait other (see comments)     Assistive Device walker, front-wheeled     Distance in Feet 140 feet   80ft using RW; 40ft using HHA simulating SPC, and 20ft using no AD    Pattern (Gait) step-through     Comment (Gait/Stairs) pt instructed to ambulate using RW and demo'd fair step legth with stable gait requiring tocuhing/steadying assit for mild imbalances; pt instructed to ambulate without AD (pt does not use AD at home) and gait became significantly unsteady requiring Riky to steady; pt provided PT hand to simulate using SPC and pt stability minimally improved; pt edu provieed on using RW vs SPC for increased stability        Stairs Training    [place], Stairs not tested     Comment pt highly fatigued after ambulation (+/-) AD        Safety Issues, Functional  Mobility    Safety Issues Affecting Function (Mobility) insight into deficits/self-awareness;safety precautions follow-through/compliance;safety precaution awareness     Impairments Affecting Function (Mobility) balance;endurance/activity tolerance;strength        Balance    Static Sitting Balance WFL     Dynamic Sitting Balance WFL     Static Standing Balance mild impairment     Dynamic Standing Balance mild impairment     Comment, Balance steadying assist- Riky for mobility        Motor Skills    Functional Endurance fair        Coping    Observed Emotional State calm;cooperative     Verbalized Emotional State acceptance     Trust Relationship/Rapport care explained;questions answered        AM-PAC (TM) - Mobility (Current Function)    Turning from your back to your side while in a flat bed without using bedrails? 3 - A Little     Moving from lying on your back to sitting on the side of a flat bed without using bedrails? 3 - A Little     Moving to and from a bed to a chair? 3 - A Little     Standing up from a chair using your arms? 3 - A Little     To walk in a hospital room? 3 - A Little     Climbing 3-5 steps with a railing? 3 - A Little     AM-PAC (TM) Mobility Score 18        Assessment/Plan (PT)    Daily Outcome Statement pt is a 89 y/o F s/p hypoxia. She was overall closeS for bed mobility, minAx1 for STS, and touching-Riky for ambulation (+/-) AD. She appears to be functioning below her baseline level of independent within her home without assist. [facility], safety, and endurance to ensure a safe return home. If pt is to return home after d/c from hospital, she was educated on use of RW full-time and would need 24 hour HHA/ daughter assist for all mobility needs to reduce risk of falls. Pt educated on dispo rec and concerns with returning home. Pt voiced understanding and would like to discuss options with CM/daughter. PT will continue to follow     Rehab  Potential good, to achieve stated therapy goals     Therapy Frequency 3-5 times/wk     Planned Therapy Interventions balance training;bed mobility training;gait training;stair training;strengthening;transfer training               PT Assessment/Plan      Most Recent Value   PT Recommended Discharge Disposition skilled nursing facility at 02/22/2022 0956   Anticipated Equipment Needs at Discharge (PT) none  [pt own appropriate DME] at 02/22/2022 0956   Patient/Family Therapy Goals Statement i prefer home but ill talk to my daughter about SNF at 02/22/2022 0956                    Education Documentation  Joint Mobility/Strength, taught by Maris King PT at 2/22/2022  1:39 PM.  Learner: Patient  Readiness: Acceptance  Method: Explanation  Response: Verbalizes Understanding  Comment: pt edu provided on call bell, need for assist, role of PT, importance of OOB mobility, safety, energy conservation, proper use of AD    Home Safety, taught by Maris King PT at 2/22/2022  1:39 PM.  Learner: Patient  Readiness: Acceptance  Method: Explanation  Response: Verbalizes Understanding  Comment: pt edu provided on call bell, need for assist, role of PT, importance of OOB mobility, safety, energy conservation, proper use of AD    Energy Conservation, taught by Maris King PT at 2/22/2022  1:39 PM.  Learner: Patient  Readiness: Acceptance  Method: Explanation  Response: Verbalizes Understanding  Comment: pt edu provided on call bell, need for assist, role of PT, importance of OOB mobility, safety, energy conservation, proper use of AD    Assistive/Adaptive Devices, taught by Maris King PT at 2/22/2022  1:39 PM.  Learner: Patient  Readiness: Acceptance  Method: Explanation  Response: Verbalizes Understanding  Comment: pt edu provided on call bell, need for assist, role of PT, importance of OOB mobility, safety, energy conservation, proper use of AD          PT Goals      Most Recent Value   Bed Mobility Goal 1     Activity/Assistive Device bed mobility activities, all at 02/22/2022 0956   [place] independent at 02/22/2022 0956   Time Frame by discharge at 02/22/2022 0956   Progress/Outcome goal ongoing at 02/22/2022 0956   Transfer Goal 1    Activity/Assistive Device sit-to-stand/stand-to-sit at 02/22/2022 0956   [place] modified independence at 02/22/2022 0956   Time Frame by discharge at 02/22/2022 0956   Progress/Outcome goal ongoing at 02/22/2022 0956   Gait Training Goal 1    Activity/Assistive Device gait (walking locomotion), assistive device use at 02/22/2022 0956   [place] modified independence at 02/22/2022 0956   Distance 250 at 02/22/2022 0956   Time Frame 2 weeks at 02/22/2022 0956   Progress/Outcome goal ongoing at 02/22/2022 0956   Stairs Goal 1    Activity/Assistive Device ascending stairs, descending stairs at 02/22/2022 0956   [place] modified independence at 02/22/2022 0956   Number of Stairs 12 at 02/22/2022 0956   Time Frame 2 weeks at 02/22/2022 0956   Progress/Outcome goal ongoing at 02/22/2022 0956

## 2022-02-22 NOTE — PLAN OF CARE
Problem: Adult Inpatient Plan of Care  Goal: Plan of Care Review  Outcome: Progressing  Flowsheets  Taken 2/22/2022 1338 by Maris King, PT  Progress: improving  Outcome Summary: PT IE completed as documented.  Taken 2/22/2022 1335 by Maris Chavez, OT  Plan of Care Reviewed With: patient     Problem: Mobility Impairment  Goal: Optimal Mobility  Outcome: Progressing

## 2022-02-22 NOTE — PROGRESS NOTES
"   Hospital Medicine Service -  Daily Progress Note       SUBJECTIVE   Interval History: She was seen by physical therapy.  She does not need oxygen with ambulation.  Our therapist recommended SNF for discharge.  Patient wants to discuss with her daughter prior to making any decision.  Updated daughter over the phone.   OBJECTIVE      Vital signs in last 24 hours:  Temp:  [36.3 °C (97.3 °F)-36.9 °C (98.5 °F)] 36.3 °C (97.3 °F)  Heart Rate:  [80-97] 85  Resp:  [16-18] 18  BP: (122-156)/(61-73) 131/65    Intake/Output Summary (Last 24 hours) at 2/22/2022 1330  Last data filed at 2/22/2022 0500  Gross per 24 hour   Intake --   Output 280 ml   Net -280 ml       PHYSICAL EXAMINATION      Physical Exam   Visit Vitals  /65 (BP Location: Right upper arm, Patient Position: Sitting)   Pulse 85   Temp 36.3 °C (97.3 °F) (Oral)   Resp 18   Ht 1.651 m (5' 5\")   Wt 54 kg (119 lb 0.8 oz)   SpO2 93%   BMI 19.81 kg/m²     General:  Awake, alert.  No acute distress.  HEENT: Sclerae anicteric.  Moist mucus membranes.   Lungs: Bilateral ronchis. No wheezing.   Cardiovascular:  Regular rate and rhythm.  No murmurs.  Abdomen:  Soft, non tender, non distended.  Positive bowel sounds.  :NA  Extremities:  No edema bilaterally.  Neuro: Non-focal.  Skin: Pink  Psych: A/Ox3; full range affect, pleasant and cooperative.     LABS / IMAGING / TELE      Labs  CBC   CBC Results       02/19/22 02/18/22 0424 2130    WBC 3.84 3.96    RBC 3.91 3.93    HGB 11.6 11.7    HCT 33.9 34.5    MCV 86.7 87.8    MCH 29.7 29.8    MCHC 34.2 33.9     171         BMP or CMP   BMP Results       02/19/22 02/18/22 0424 2130     134    K 3.4 3.7    Cl 99 96    CO2 26 26    Glucose 112 120    BUN 12 17    Creatinine 0.6 0.9    Calcium 8.6 8.9    Anion Gap 11 12    EGFR >60.0 59.0        CMP Results       02/19/22 02/18/22 0424 2130     134    K 3.4 3.7    Cl 99 96    CO2 26 26    Glucose 112 120    BUN 12 17    Creatinine 0.6 0.9    " Calcium 8.6 8.9    Anion Gap 11 12    AST 37 36    ALT 20 21    Albumin 3.8 4.3    EGFR >60.0 59.0           INR          Imaging  FLUOROSCOPY VIDEO SWALLOW WITH SPEECH    Result Date: 2/21/2022  CLINICAL HISTORY: dysphagia COMMENT:  A video swallow study was conducted jointly by Speech Pathology and Radiology to determine the risks or presence of dysphagia, and to delineate a plan of care as indicated.  The patient was provided with the following substances:  barium impregnated puree, regular, thin liquids (cup, straw), mildly thick liquids (cup), moderately thick liquids(cup). CLINICAL COURSE: The pt is an 89 year old female admitted to [facility] with cough, congestion, fever, lack of appetite and generalized malaise. She was hypoxic in the ER to 88%. CT chest/CXR positive for PNA, possibly related to aspiration. She denied any swallowing difficulty but c/o occasional regurgitation and globus sensation at the level of the suprasternal notch.  She consumes a regular diet with thin liquids at baseline but reports a poor appetite. Mastication was slow but with effective bolus breakdown given time. There was inconsistent incohesive bolus formation. Lingual transfers were timely. There was suspected posterior leakage with liquids and delayed onset of the swallow. Laryngeal elevation was reduced and there was a discoordinated swallow pattern with liquids. She had intermittent throat clearing with thin and mildly thick liquid c/f aspiration. Given b/s performance and c/f pneumonia, instrumental swallowing assessment is recommended for further evaluation of swallowing function. PAST MEDICAL HISTORY: Breast cancer, HTN, hypothyroidism VIEW: Lateral projection, pt seated upright in stretcher. FLUOROSCOPY TIME: 2:28 min Images/Series Sent 21 Entrance Dose: 3.2 mGy ORAL STAGE: Mild oral dysphagia. Good oral access and containment. Rotary mastication was slightly prolonged, but total bolus breakdown was achieved with increased time.  Lingual pumping was used to achieve bolus cohesion with effortful lingual transfers with food textures; transfers were more efficient with liquids. There was trace-mild amounts of oral residue with food textures cleared with liquid wash. There was postlingual leakage to the vallecula with puree and solid textures, to the posterior laryngeal surface of the epiglottis with mildly thick liquids, and to the pyriform sinus with thin liquids. PHARYNGEAL STAGE: Moderate pharyngeal dysphagia. There was a brief swallow delay allowing for food textured bolus to leak to the vallecula, mildly thick liquids to leak to the posterior laryngeal surface of the epiglottis, and thin liquids to the pyriform sinus. There was adequate tongue base retraction and pharyngeal constriction resulting in total pharyngeal clearance. There was reduced hyolaryngeal elevation and excursion. Epiglottic inversion was sluggish and allowed for decreased airway protection. There was penetration with mildly thick liquids when leaked materials entered the airway during the swallow. Materials were seen to spontaneously redirect from the airway. There were two instances of aspiration with thin liquids when leaked materials entered the airway during the swallow due to sluggish epiglottic inversion. There was a consistent sensory response to penetration/aspiration, but the cough/throat clear was not effective at redirecting the bolus from the airway. A prep set was attempted but the pt was not able to use the strategy correctly. A chin tuck was successfully used to achieve airway protection with thin liquids via cup and straw. There were no penetration of aspiration events with food textures or with thin liquids when the pt was using chin tuck. ESOPHAGEAL STAGE: Not assessed, though pt reports history of GERD. SPEECH THERAPIST RECOMMENDATIONS: 1. Regular diet (L7) with thin liquids (L0) using chin tuck compensatory swallow strategy with every sip. 2. Strict  aspiration/GERD precautions for safety including small bites/sips, cyclic ingestion, slow rate of ingestion, and upright positioning during and after meals. 3. Close supervision at mealtimes to ensure use of compensatory swallow strategy. 4. Continued speech therapy focused on consistent independent use of swallow strategy for carryover outside of the hospital setting.     IMPRESSION: 1. Mild oral dysphagia. 2. Moderate pharyngeal dysphagia with aspiration of thin liquids compensated for using the chin tuck swallow strategy. Dr. Leonardo Ochoa was present for this examination. The undersigned radiologist agrees only with the radiographic findings. Specific swallowing recommendations are solely the purview of the Speech Pathology Division. Maddie De Los Santos MA, CF-SLP in conjunction with Clemencia Levi MA, CCC-SLP/L         ECG/Telemetry  I have independently reviewed the telemetry. No events for the last 24 hours.    ASSESSMENT AND PLAN      * Hypoxia  Assessment & Plan  Assessment: 88% on room air at initial evaluation by EMS, subsequently improved with 2 liters of supplemental O2. Has recent sick contacts. No leukocytosis is noted.   Most likely viral infection- bronchopneumonia, bacterial suprainfection seems unlikely.    Plan:  Continue supportive care.    Human metapneumovirus pneumonia  Assessment & Plan  Supportive care.  Screen for oxygen needs.    Arthritis  Assessment & Plan  Patient has degenerative arthritis.  Tylenol as needed for pain.  At home uses tramadol.  PDMP verified.  Resumed tramadol as needed for severe pain.    Hyperlipidemia  Assessment & Plan  Continue statin.    GERD (gastroesophageal reflux disease)  Assessment & Plan  Continue PPI.    Primary hypertension  Assessment & Plan  Blood pressures are acceptable while here.    Continue holding the thiazide diuretic.   Continue Losartan and Amlodipine.     Elevated troponin  Assessment & Plan  Likely demand ischemia in the setting of acute  illness, not MI.      Bronchopneumonia  Assessment & Plan  For details see #hypoxia  Most likely viral pneumonia.  Continue supportive care.  Antibiotics stopped.  Appreciate ID input.    Other specified hypothyroidism  Assessment & Plan  Continue levothyroxine.        VTE Assessment: [place] VTE Score: 5  VTE Prophylaxis Plan: heparin  Code Status: DNR (A.N.D.)  Estimated Discharge Date: 2/23/2022  Disposition Planning: christo Curtis MD  2/22/2022

## 2022-02-22 NOTE — PATIENT CARE CONFERENCE
Care Progression Rounds Note  Date: 2/22/2022  Time: 11:21 AM     Patient Name: Merced Robin     Medical Record Number: [id number]   YOB: 1931  Sex: Female      Room/Bed: 0308    Admitting Diagnosis: Hypoxia [R09.02]  Pneumonia due to infectious organism, unspecified laterality, unspecified part of lung [J18.9]   Admit Date/Time: 2/18/2022  9:08 PM    Primary Diagnosis: Hypoxia  Principal Problem: Hypoxia    GMLOS: 3.1  Anticipated Discharge Date: 2/23/2022    AM-PAC:  Mobility Score:      Discharge Planning:  Concerns to be Addressed: care coordination/care conferences,discharge planning  Anticipated Discharge Disposition: skilled nursing facility    Barriers to Discharge:  Medical issues not resolved    Comments:  Per RN, clear lungs; dc pending PT/OT; daughter wants SNF; possible dc on 2/23    Participants:  pharmacy,,physical therapy,nursing,occupational therapy,social work/services

## 2022-02-22 NOTE — PROGRESS NOTES
Patient:  Merced Robin  Location:  [facility] 3B 0308  MRN:  [id number]  Today's date:  2/22/2022     Speech Pathology: Therapy session    SLP Diagnosis: Known mild oral and moderate pharyngeal dysphagia known    Recommendations:  1. Appears safe to continue regular diet (L7) and continue with thin liquids (L0) using chin tuck swallow strategy with every sip.   2. Distant supervision at mealtime-remind patient to strategy whenever possible.   3. Strict aspiration/GERD precautions as patient has known history of reflux and is at higher risk of reverse aspiration.    4. ST will continue to follow to ensure generalization of strategy outside hospital.    Summary/Handoff:  Daily Outcome Statement: ST follow-up completed at bedside.  The purpose of today's session was to review results of VFSS and continue to train chin yani.  Patient reports that she used the compensatory strategies successfully yesterday.  Trials of regular solids with slow but effective mastication, mild oral residue cleared with liquid wash.  Patient successfully using compensatory swallowing strategy at bedside and reports understanding by the strategies necessary to protect her airway.  Appears safe to continue regular diet (L7) and continue with thin liquids (L0) using chin tuck swallow strategy with every sip.  Distant supervision at mealtime-remind patient to strategy whenever possible.  Strict aspiration/GERD precautions as patient has known history of reflux and is at higher risk of reverse aspiration.  ST will continue to follow to ensure generalization of strategy outside hospital.    VFSS 2/21:   SPEECH THERAPIST RECOMMENDATIONS:  1. Regular diet (L7) with thin liquids (L0) using chin tuck compensatory swallow  strategy with every sip.  2. Strict aspiration/GERD precautions for safety including small bites/sips,  cyclic ingestion, slow rate of ingestion, and upright positioning during and  after meals.  3. Close supervision at  mealtimes to ensure use of compensatory swallow  strategy.  4. Continued speech therapy focused on consistent independent use of swallow  strategy for carryover outside of the hospital setting.     IMPRESSION:  1. Mild oral dysphagia.  2. Moderate pharyngeal dysphagia with aspiration of thin liquids compensated for  using the chin tuck swallow strategy.    Maddie De Los Santos MA, CF-SLP         Dietary Orders   (From admission, onward)             Start     Ordered    02/21/22 1427  Adult Diet Regular; 3-4gm Sodium CARLO; Thin Liquids; RD/LDN may adjust order  Diet effective now        Comments: When drinking recommend using chin tuck compensatory strategy with every sip.   References:    IDDSI Diet reference   Question Answer Comment   Diet Texture Regular    Sodium Restriction: 3-4gm Sodium CARLO    Fluid Consistency: Thin Liquids    Delegation of Authority. Diet orders written by PA/Jhoan may not be adjusted by RD/LDNs. RD/LDN may adjust order        02/21/22 1426                Results from last 7 days   Lab Units 02/20/22  0601 02/19/22  0424 02/18/22  2130   WBC K/uL 3.53* 3.84 3.96   HEMOGLOBIN g/dL 11.2* 11.6* 11.7*   HEMATOCRIT % 32.3* 33.9* 34.5*   PLATELETS K/uL 159 159 171          RN cleared SLP to assess/work with patient. Following completion of session, pt remained in bed as found with call bell in reach.      Merced is a 89 y.o. female admitted on 2/18/2022 with Hypoxia [R09.02]  Pneumonia due to infectious organism, unspecified laterality, unspecified part of lung [J18.9]. Principal problem is Hypoxia.    Past Medical History  Merced has a past medical history of Breast cancer (CMS/HCC), Hypertension, and Hypothyroidism.    History of Present Illness   Pt consumes a regular diet with thin liquids at baseline.       SLP Pain    Date/Time Pain Type Rating: Rest Rating: Activity Who   02/22/22 0750 Pain Assessment 0 - no pain 0 - no pain CO            Prior Level of Function      Most Recent Value   Swallowing  swallows foods/liquids without difficulty   Baseline Diet/Method of Nutritional Intake regular, thin liquids   Past History of Dysphagia Pt c/o occasional regurgitation of food and globus sensation at the level of the suprasternal notch   Assistive Device Currently Used at Home commode, 3-in-1, cane, straight  [chair glide to 2nd floor]           SLP Evaluation and Treatment - 02/22/22 0750        SLP Time Calculation    Start Time 0750     Stop Time 0812     Time Calculation (min) 22 min        Session Details    Document Type daily treatment/progress note     Mode of Treatment speech language pathology        General Information    Patient Profile Reviewed yes     General Observations of Patient ST follow-up completed at bedside     Existing Precautions/Restrictions aspiration;fall        Cognition/Psychosocial    Orientation Status (Cognition) oriented x 4        Functional Communication Measures    FCM: Swallowing 5-->Level 5        General Swallowing Observations    Current Diet/Method of Nutritional Intake regular;thin liquids     Signs/Symptoms of Aspiration (Current Diet) none     Respiratory Support (General Swallowing Observations) none     Comment, Secretions/Suctioning Baseline cough        Food and Liquid Trials (NIS)    Patient Positioning HOB elevated (specify degrees)     Oral Intake/Feeding Performance independent/appropriate self-feeding skills     Liquid Consistencies Evaluated thin liquids     Thin Liquids sips from cup;straw sips     Comment, Thin Liquids Chin tuck     Food Consistencies Evaluated regular     Regular intact     Oral Preparatory Phase of Swallow suspect posterior bolus leak;mastication, slow but effective     Oral Phase of Swallow oral residue, incomplete swallow;delayed anterior-posterior transit     Pharyngeal Phase of Swallow no clinical symptoms     Esophageal Phase of Swallow no clinical symptoms     SpO2 Level Room air        Swallowing Recommendations    Diet Consistency  Recommendations regular;thin liquids     Medication Administration whole with pureed;whole with liquid     Supervision Level for Intake distant supervision needed     Feeding/Delivery Recommendations allow patient to feed self if maintaining safety     Posture Recommendations fully upright in chair/chair mode of bed     Swallowing Strategies chin tuck     Recommend VFSS (Comment) VFSS 2/21        Swallowing Intervention    Dysphagia/Swallowing Interventions monitor tolerance of;current diet without evidence of aspiration;advanced diet/liquid texture trials;compensatory swallowing strategies        AM-PAC (TM) - Cognition (Current Function)    Following/understanding a 10-15 minute speech or presentation? 4 - None     Understanding familiar people during ordinary conversations? 4 - None     Remembering to take medications at the appropriate time? 4 - None     Remembering where things were placed or put away? 4 - None     Remembering a list of 3 or 4 errands without writing it down? 4 - None     Taking care of complicated tasks? 4 - None     AM-PAC (TM) Cognition Score 24        Assessment/Plan (SLP)    Daily Outcome Statement ST follow-up completed at bedside.  The purpose of today's session was to review results of VFSS and continue to train chin tuck.  Patient reports that she used the compensatory strategies successfully yesterday.  Trials of regular solids with slow but effective mastication, mild oral residue cleared with liquid wash.  Patient successfully using compensatory swallowing strategy at bedside and reports understanding by the strategies necessary to protect her airway.  Appears safe to continue regular diet (L7) and continue with thin liquids (L0) using chin tuck swallow strategy with every sip.  Distant supervision at mealtime-remind patient to strategy whenever possible.  Strict aspiration/GERD precautions as patient has known history of reflux and is at higher risk of reverse aspiration.  ST will  continue to follow to ensure generalization of strategy outside hospital.     Rehab Potential good, to achieve stated therapy goals     Therapy Frequency 1-2 times/wk     Planned Therapy Interventions dysphagia therapy               SLP Assessment/Plan      Most Recent Value   SLP Diagnosis Known mild oral and moderate pharyngeal dysphagia known at 02/22/2022 0750   Patient/Family Therapy Goal Statement to improve breathing at 02/20/2022 0813                    SLP Goals      Most Recent Value   Oral Nutrition/Hydration Goal    Oral Nutrition/Hydration Goal Patient will tolerate least restrictive diet without overt s/sx of aspiration at 02/20/2022 0813   Time Frame by discharge at 02/20/2022 0813   Progress/Outcome goal ongoing at 02/22/2022 0750

## 2022-02-22 NOTE — PROGRESS NOTES
Patient:  Merced Robin  Location:  [facility] 3B 0308  MRN:  [id number]  Today's date:  2/22/2022    RN cleared patient for therapy. Session ended with patient OOB in recliner w/ chair alarm on, call bell and room phone in reach and all other needs met. NAD, VSS. Extensively educated on safety w/ AD and safety if patient were to return home. RN aware of patient in chair.    Merced is a 89 y.o. female admitted on 2/18/2022 with Hypoxia [R09.02]  Pneumonia due to infectious organism, unspecified laterality, unspecified part of lung [J18.9]. Principal problem is Hypoxia.    Past Medical History  Merced has a past medical history of Breast cancer (CMS/HCC), Hypertension, and Hypothyroidism.    History of Present Illness   Pt consumes a regular diet with thin liquids at baseline.  Presented to ER on 2/18/2022 with generalized malaise, cough and fever.  Patient reports that her grandson was recently ill with a cough and fever and subsequently she started to have the same symptoms. She states that her symptoms consist of cough, congestion, fevers, chills, fatigue, lack of appetite, generalized malaise and shortness of breath with exertion. She states that her daughter noted that the patient was having shortness of breath at rest and that she developed a fever of Tmax 103 today leading to her to come to the ER for further evaluation. It was noted that the patient was hypoxic too 88% on arrival of EMS and improved with supplemental O2 via nasal cannula at 2 L. Diagnosed with viral pneumonia.      OT Vitals    Date/Time Pulse SpO2 Pt Activity O2 Therapy BP [facility]   02/22/22 0957 84 93 % At rest None (Room air) 146/69 DB   02/22/22 1015 -- 94 % At rest None (Room air) -- DB      OT Pain    Date/Time Pain Type Rating: Rest Rating: Activity [facility]   02/22/22 0957 Pain Assessment 0 - no pain 0 - no pain DB          Prior Living Environment      Most Recent Value   People in Home child(gricelda), adult   Current Living Arrangements home    Home Accessibility stair glide, stairs to enter home (Group)   Living Environment Comment lives in 3 story home with daughter, 5 [address] with BL rail, 2nf floor bed/ bath with powder room on 1st floor   Number of Stairs, Main Entrance 5   Stair Railings, Main Entrance railings on both sides of stairs        Prior Level of Function      Most Recent Value   Ambulation assistive equipment   Transferring assistive equipment   Toileting independent   Bathing independent   Dressing independent   Eating independent   Communication understands/communicates without difficulty   Swallowing swallows foods/liquids without difficulty   Baseline Diet/Method of Nutritional Intake regular, thin liquids   Past History of Dysphagia Pt c/o occasional regurgitation of food and globus sensation at the level of the suprasternal notch   Prior Level of Function Comment Pt has AD at home, however, does not use frequently   Assistive Device Currently Used at Home commode, 3-in-1, cane, straight, stair glide, walker, front-wheeled        Occupational Profile      Most Recent Value   Reason for Services/Referral ADL/functional mobility dysfx 2/2 viral pneumonia/hypoxia   Performance Patterns Likes to take daily walk down to the mailbox each day to retrieve mail   Environmental Supports and Barriers Pt lives at home w/ daughter   Patient Goals To get better           OT Evaluation and Treatment - 02/22/22 0957        OT Time Calculation    Start Time 0957     Stop Time 1019     Time Calculation (min) 22 min        Session Details    Document Type initial evaluation     Mode of Treatment occupational therapy        General Information    Patient Profile Reviewed yes     Onset of Illness/Injury or Date of Surgery 02/18/22     Referring Physician Kirsten     Patient/Family/Caregiver Comments/Observations RN cleared patient for therapy, encouraging patient OOB to recliner     General Observations of Patient Awake, alert, pleasant, cooperative      Existing Precautions/Restrictions airborne;fall;contact;droplet        Cognition/Psychosocial    Affect/Mental Status (Cognition) WFL     Orientation Status (Cognition) oriented x 4     Follows Commands (Cognition) WFL     Cognitive Function safety deficit     Safety Deficit (Cognition) insight into deficits/self-awareness;awareness of need for assistance;judgment        Hearing Assessment    Hearing Status WFL        Vision Assessment/Intervention    Visual Impairment/Limitations corrective lenses for reading        Sensory Assessment (Somatosensory)    Sensory Assessment (Somatosensory) UE sensation intact        Range of Motion (ROM)    Range of Motion ROM is WFL;bilateral upper extremities        Strength (Manual Muscle Testing)    Strength (Manual Muscle Testing) strength is WFL;bilateral upper extremities        Bed Mobility    [place], Supine to Sit close supervision;verbal cues     Verbal Cues (Supine to Sit) technique     Assistive Device head of bed elevated     Comment (Bed Mobility) (-) LOB        Bed to Chair Transfer    [place], Bed to Chair minimum assist (75% or more patient effort)     Verbal Cues hand placement;preparatory posture;proper use of assistive device;safety;technique     Assistive Device walker, front-wheeled;other (see comments)     Comment Pt w/ improved posture and gait when using RW, (-) LOB, O2 stable        Sit to Stand Transfer    [place], Sit to Stand Transfer minimum assist (75% or more patient effort)     Verbal Cues hand placement;preparatory posture;proper use of assistive device;safety;technique     Assistive Device walker, front-wheeled;other (see comments)     Comment (-) LOB, cued for safe hand placement        Stand to Sit Transfer    [place], Stand to Sit Transfer minimum assist (75% or more patient effort)     Verbal Cues hand placement;preparatory posture;proper use of assistive device;safety;technique     Assistive Device walker,  front-wheeled;other (see comments)     Comment (-) LOB, cued for safe hand placement        Balance    Static Sitting Balance WFL     Dynamic Sitting Balance WFL     Static Standing Balance mild impairment     Dynamic Standing Balance mild impairment        Upper Body Dressing    Tasks don;front-opening garment     Self-Performance don;threads left arm, jacket/sweater;threads right arm, jacket/sweater;pulls jacket/sweater over head/around back;pulls jacket/sweater down/adjusts     [place] set up     Position edge of bed sitting     Comment (-) LOB, O2 stable        AM-PAC (TM) - ADL (Current Function)    Putting on and taking off regular lower body clothing? 3 - A Little     Bathing? 3 - A Little     Toileting? 3 - A Little     Putting on/taking off regular upper body clothing? 3 - A Little     How much help for taking care of personal grooming? 3 - A Little     Eating meals? 4 - None     AM-PAC (TM) ADL Score 19        Assessment/Plan (OT)    Daily Outcome Statement OT eval complete. Pt close S for bed mobility, min A for sit <> stand and bed > chair transfer using RW. Cued for safe RW use, pt demonstrated inc steadiness when using AD when compared to no AD. Set up for UB dressing EOB in prep for functional mobility to maximize independence. O2 steady for duration of session > 94%. Acute OT rec d/c to SNF to maximize independence and address deficits. If patient does not go to SNF, patient will require A w/ ALL functional mobility/transfers/ADLs and home care therapy. Acute OT to follow.     Rehab Potential good, to achieve stated therapy goals     Therapy Frequency 3-5 times/wk     Planned Therapy Interventions activity tolerance training;adaptive equipment training;functional balance retraining;patient/caregiver education/training;occupation/activity based interventions;strengthening exercise;transfer/mobility retraining               OT Assessment/Plan      Most Recent Value   OT Recommended Discharge  Disposition skilled nursing facility at 02/22/2022 0957   Anticipated Equipment Needs At Discharge (OT) --  [TBD at SNF,  pt w/ SPC and FWW at home] at 02/22/2022 0957   Patient/Family Therapy Goal Statement To get better at 02/22/2022 0957                    Education Documentation  Self-Care, taught by Maris Chavez OT at 2/22/2022  1:35 PM.  Learner: Patient  Readiness: Acceptance  Method: Explanation, Demonstration  Response: Verbalizes Understanding, Needs Reinforcement, Demonstrated Understanding  Comment: OT POC, energy conservation strategies (activity pacing, breathing techniques, rest breaks), safety during functional mobility and use of AD to maximize independence          OT Goals      Most Recent Value   Bed Mobility Goal 1    Activity/Assistive Device bed mobility activities, all at 02/22/2022 0957   [place] independent at 02/22/2022 0957   Time Frame by discharge at 02/22/2022 0957   Progress/Outcome goal ongoing at 02/22/2022 0957   Transfer Goal 1    Activity/Assistive Device all transfers at 02/22/2022 0957   [place] modified independence at 02/22/2022 0957   Time Frame by discharge at 02/22/2022 0957   Progress/Outcome goal ongoing at 02/22/2022 0957   Dressing Goal 1    Activity/Adaptive Equipment dressing skills, all at 02/22/2022 0957   [place] independent at 02/22/2022 0957   Time Frame by discharge at 02/22/2022 0957   Progress/Outcome goal ongoing at 02/22/2022 0957   Toileting Goal 1    Activity/Assistive Device toileting skills, all at 02/22/2022 0957   [place] modified independence at 02/22/2022 0957   Time Frame by discharge at 02/22/2022 0957   Progress/Outcome goal ongoing at 02/22/2022 0957

## 2022-02-22 NOTE — PLAN OF CARE
Plan of Care Review  Plan of Care Reviewed With: patient  Progress: improving  Outcome Summary: Pt tolerated all medication admin. Pt OOB to chair for meals. Pt remains on RA will continue to monitor.

## 2022-02-22 NOTE — PLAN OF CARE
Problem: Adult Inpatient Plan of Care  Goal: Plan of Care Review  Outcome: Progressing  Flowsheets (Taken 2/22/2022 6319)  Progress: improving  Plan of Care Reviewed With: patient  Outcome Summary: OT Kimberley complete

## 2022-02-23 PROBLEM — J18.0 BRONCHOPNEUMONIA: Status: RESOLVED | Noted: 2022-02-19 | Resolved: 2022-02-23

## 2022-02-23 PROBLEM — R09.02 HYPOXIA: Status: RESOLVED | Noted: 2022-02-18 | Resolved: 2022-02-23

## 2022-02-23 LAB
ANION GAP SERPL CALC-SCNC: 12 MEQ/L (ref 3–15)
BUN SERPL-MCNC: 23 MG/DL (ref 8–20)
CALCIUM SERPL-MCNC: 9.7 MG/DL (ref 8.9–10.3)
CHLORIDE SERPL-SCNC: 98 MEQ/L (ref 98–109)
CO2 SERPL-SCNC: 27 MEQ/L (ref 22–32)
CREAT SERPL-MCNC: 0.6 MG/DL (ref 0.6–1.1)
GFR SERPL CREATININE-BSD FRML MDRD: >60 ML/MIN/1.73M*2
GLUCOSE SERPL-MCNC: 130 MG/DL (ref 70–99)
MAGNESIUM SERPL-MCNC: 2 MG/DL (ref 1.8–2.5)
POTASSIUM SERPL-SCNC: 3.9 MEQ/L (ref 3.6–5.1)
QST SEROTYPE 1 (1): 1.3
QST SEROTYPE 12 (12F): <0.3
QST SEROTYPE 14 (14): 3
QST SEROTYPE 19 (19F): 5.2
QST SEROTYPE 23 (23F): 0.7
QST SEROTYPE 3 (3): 1.4
QST SEROTYPE 4 (4): 1.2
QST SEROTYPE 5 (5): 1.1
QST SEROTYPE 8 (8): 0.3
QST SEROTYPE 9 (9N): 0.6
S PNEUM DA 18C IGG SER-MCNC: 13.4
S PNEUM DA 6B IGG SER-MCNC: 1.1 UG/ML
S PNEUM DA 7F IGG SER-MCNC: 30.9 UG/ML
S PNEUM DA 9V IGG SER-MCNC: 0.8 UG/ML
SODIUM SERPL-SCNC: 137 MEQ/L (ref 136–144)

## 2022-02-23 PROCEDURE — 25000000 HC PHARMACY GENERAL: Performed by: HOSPITALIST

## 2022-02-23 PROCEDURE — 63700000 HC SELF-ADMINISTRABLE DRUG: Performed by: HOSPITALIST

## 2022-02-23 PROCEDURE — 94667 MNPJ CHEST WALL 1ST: CPT

## 2022-02-23 PROCEDURE — 97116 GAIT TRAINING THERAPY: CPT | Mod: GP

## 2022-02-23 PROCEDURE — 97535 SELF CARE MNGMENT TRAINING: CPT | Mod: GO

## 2022-02-23 PROCEDURE — 80048 BASIC METABOLIC PNL TOTAL CA: CPT | Performed by: HOSPITALIST

## 2022-02-23 PROCEDURE — 20600000 HC ROOM AND CARE INTERMEDIATE/TELEMETRY

## 2022-02-23 PROCEDURE — 94640 AIRWAY INHALATION TREATMENT: CPT

## 2022-02-23 PROCEDURE — 92526 ORAL FUNCTION THERAPY: CPT | Mod: GN

## 2022-02-23 PROCEDURE — 99233 SBSQ HOSP IP/OBS HIGH 50: CPT | Performed by: HOSPITALIST

## 2022-02-23 PROCEDURE — 63600000 HC DRUGS/DETAIL CODE: Performed by: HOSPITALIST

## 2022-02-23 PROCEDURE — 83735 ASSAY OF MAGNESIUM: CPT | Performed by: HOSPITALIST

## 2022-02-23 PROCEDURE — 36415 COLL VENOUS BLD VENIPUNCTURE: CPT | Performed by: HOSPITALIST

## 2022-02-23 RX ADMIN — HEPARIN SODIUM 5000 UNITS: 5000 INJECTION, SOLUTION INTRAVENOUS; SUBCUTANEOUS at 14:01

## 2022-02-23 RX ADMIN — ROSUVASTATIN CALCIUM 10 MG: 10 TABLET, FILM COATED ORAL at 09:22

## 2022-02-23 RX ADMIN — Medication 100 MCG: at 09:22

## 2022-02-23 RX ADMIN — PAROXETINE 20 MG: 20 TABLET, FILM COATED ORAL at 09:22

## 2022-02-23 RX ADMIN — PANTOPRAZOLE SODIUM 40 MG: 40 TABLET, DELAYED RELEASE ORAL at 09:21

## 2022-02-23 RX ADMIN — LOSARTAN POTASSIUM 50 MG: 50 TABLET, FILM COATED ORAL at 09:22

## 2022-02-23 RX ADMIN — GUAIFENESIN 1200 MG: 600 TABLET, EXTENDED RELEASE ORAL at 09:22

## 2022-02-23 RX ADMIN — LEVOTHYROXINE SODIUM 75 MCG: 75 TABLET ORAL at 06:35

## 2022-02-23 RX ADMIN — IPRATROPIUM BROMIDE AND ALBUTEROL SULFATE 3 ML: .5; 3 SOLUTION RESPIRATORY (INHALATION) at 06:36

## 2022-02-23 RX ADMIN — HEPARIN SODIUM 5000 UNITS: 5000 INJECTION, SOLUTION INTRAVENOUS; SUBCUTANEOUS at 21:08

## 2022-02-23 RX ADMIN — IPRATROPIUM BROMIDE AND ALBUTEROL SULFATE 3 ML: .5; 3 SOLUTION RESPIRATORY (INHALATION) at 17:16

## 2022-02-23 RX ADMIN — AMLODIPINE BESYLATE 5 MG: 5 TABLET ORAL at 09:21

## 2022-02-23 RX ADMIN — IPRATROPIUM BROMIDE AND ALBUTEROL SULFATE 3 ML: .5; 3 SOLUTION RESPIRATORY (INHALATION) at 12:19

## 2022-02-23 RX ADMIN — HEPARIN SODIUM 5000 UNITS: 5000 INJECTION, SOLUTION INTRAVENOUS; SUBCUTANEOUS at 06:36

## 2022-02-23 RX ADMIN — GUAIFENESIN 1200 MG: 600 TABLET, EXTENDED RELEASE ORAL at 19:58

## 2022-02-23 ASSESSMENT — COGNITIVE AND FUNCTIONAL STATUS - GENERAL
WALKING IN HOSPITAL ROOM: 3 - A LITTLE
REMEMBERING TO TAKE MEDICATION: 4 - NONE
TOILETING: 3 - A LITTLE
DRESSING REGULAR UPPER BODY CLOTHING: 3 - A LITTLE
MOVING TO AND FROM BED TO CHAIR: 3 - A LITTLE
HELP NEEDED FOR PERSONAL GROOMING: 3 - A LITTLE
FOLLOWS FAMILIAR CONVERSATION: 4 - NONE
UNDERSTANDING 10 TO 15 MIN SPEECH: 4 - NONE
HELP NEEDED FOR BATHING: 3 - A LITTLE
EATING MEALS: 4 - NONE
AFFECT: WFL
REMEMBERING 5 ERRANDS WITH NO LIST: 4 - NONE
STANDING UP FROM CHAIR USING ARMS: 3 - A LITTLE
DRESSING REGULAR LOWER BODY CLOTHING: 3 - A LITTLE
CLIMB 3 TO 5 STEPS WITH RAILING: 3 - A LITTLE
TAKING CARE OF COMPLICATED TASKS: 4 - NONE
AFFECT: WFL
REMEMBERING WHERE THINGS ARE: 4 - NONE

## 2022-02-23 NOTE — PATIENT CARE CONFERENCE
Care Progression Rounds Note  Date: 2/23/2022  Time: 3:28 PM     Patient Name: Merced Robin     Medical Record Number: [id number]   YOB: 1931  Sex: Female      Room/Bed: 0308    Admitting Diagnosis: Hypoxia [R09.02]  Pneumonia due to infectious organism, unspecified laterality, unspecified part of lung [J18.9]   Admit Date/Time: 2/18/2022  9:08 PM    Primary Diagnosis: Human metapneumovirus pneumonia  Principal Problem: Human metapneumovirus pneumonia    GMLOS: 3.1  Anticipated Discharge Date: 2/24/2022    AM-PAC:  Mobility Score: 18    Discharge Planning:  Current Living Arrangements: home  Concerns to be Addressed: care coordination/care conferences,discharge planning  Anticipated Discharge Disposition: skilled nursing facility    Barriers to Discharge:       Comments:       Participants:

## 2022-02-23 NOTE — PLAN OF CARE
's Note  Leta, daughter was offered other SNFs as an option for dc. However,she remains adamant about dc to [facility] or [facility]. No beds at [facility] per Angie Samuels. Jam Haider LMSW, [facility] emailed financial worksheet to ISABEL, which was forward to [email or web address]. ISABEL faxed current clinical information to Jam, [phone]. [facility] has a bed for pt on 2/24 pending submission of financial worksheet. Leta states she will complete financial worksheet tonight at 10:00 PM.    MADELINE Lucero,MANDO  Pager#[phone]

## 2022-02-23 NOTE — PROGRESS NOTES
Patient: Merced Robin  Location:  [facility] 3B 0308  MRN:  [id number]  Today's date:  2/23/2022    Merced is a 89 y.o. female admitted on 2/18/2022 with Hypoxia [R09.02]  Pneumonia due to infectious organism, unspecified laterality, unspecified part of lung [J18.9]. Principal problem is Hypoxia.    Past Medical History  Merced has a past medical history of Breast cancer (CMS/HCC), Hypertension, and Hypothyroidism.    History of Present Illness   Pt consumes a regular diet with thin liquids at baseline.  Presented to ER on 2/18/2022 with generalized malaise, cough and fever.  Patient reports that her grandson was recently ill with a cough and fever and subsequently she started to have the same symptoms. She states that her symptoms consist of cough, congestion, fevers, chills, fatigue, lack of appetite, generalized malaise and shortness of breath with exertion. She states that her daughter noted that the patient was having shortness of breath at rest and that she developed a fever of Tmax 103 today leading to her to come to the ER for further evaluation. It was noted that the patient was hypoxic too 88% on arrival of EMS and improved with supplemental O2 via nasal cannula at 2 L. Diagnosed with viral pneumonia.    Patient was received sitting OOB in chair. Patient was left at end of session sitting in chair with call bell in reach and alarm on. RN aware of PT encounter.     Pt O2 desat to 88% while ambulating but recovered in <30secs. RN aware. Pt denied complaints other than fatigue. 3/10 on Modified Whit      PT Vitals    Date/Time Pulse HR Source SpO2 Pt Activity O2 Therapy BP BP Location BP Method Pt Position Observations [facility]   02/23/22 1248 95 Monitor 94 % At rest None (Room air) 123/62 Right upper arm Automatic Sitting -- DM   02/23/22 1255 106 -- 88 % Walking None (Room air) -- -- -- -- recovered to 90's in <30sec DM   02/23/22 1302 101 Monitor 94 % At rest None (Room air) 141/67 Right upper arm Automatic  Sitting -- DM      PT Pain    Date/Time Pain Type Rating: Rest Rating: Activity [facility]   02/23/22 1248 Pain Assessment 0 - no pain 0 - no pain DM          Prior Living Environment      Most Recent Value   People in Home child(gricelda), adult   Current Living Arrangements home   Home Accessibility stair glide, stairs to enter home (Group)   Living Environment Comment lives in 3 story home with daughter, 6 [address] PAUL with BL rail, 2nf floor bed/ bath with powder room on 1st floor   Number of Stairs, Main Entrance 5   Stair Railings, Main Entrance railings on both sides of stairs        Prior Level of Function      Most Recent Value   Ambulation assistive equipment   Transferring assistive equipment   Toileting independent   Bathing independent   Dressing independent   Eating independent   Communication understands/communicates without difficulty   Swallowing swallows foods/liquids without difficulty   Baseline Diet/Method of Nutritional Intake regular, thin liquids   Past History of Dysphagia Pt c/o occasional regurgitation of food and globus sensation at the level of the suprasternal notch   Prior Level of Function Comment Pt has AD at home, however, does not use frequently   Assistive Device Currently Used at Home commode, 3-in-1, cane, straight, stair glide, walker, front-wheeled           PT Evaluation and Treatment - 02/23/22 1248        PT Time Calculation    Start Time 1248     Stop Time 1302     Time Calculation (min) 14 min        Session Details    Document Type daily treatment/progress note     Mode of Treatment physical therapy        General Information    Patient Profile Reviewed yes     Onset of Illness/Injury or Date of Surgery 02/18/22     General Observations of Patient pt received OOB in chair     Existing Precautions/Restrictions aspiration;fall;droplet;contact   (+) human metapneumovirus       Cognition/Psychosocial    Affect/Mental Status (Cognition) WFL     Orientation Status (Cognition) oriented x 4      Follows Commands (Cognition) WFL     Cognitive Function WFL     Comment, Cognition pleasant and cooperative        Bed Mobility    Comment (Bed Mobility) NT- pt received OOB        Sit to Stand Transfer    Margaret, Sit to Stand Transfer close supervision     Verbal Cues hand placement;proper use of assistive device;safety;technique     Assistive Device walker, standard     Comment increased time        Stand to Sit Transfer    Margaret, Stand to Sit Transfer close supervision     Verbal Cues hand placement;proper use of assistive device;safety;technique     Assistive Device walker, front-wheeled     Comment VCs to refrain from prematurly sitting        Gait Training    Margaret, Gait touching/steadying assist     Assistive Device walker, standard     Distance in Feet 150 feet     Pattern (Gait) step-to     Deviations/Abnormal Patterns (Gait) step length decreased;stride length decreased;gait speed decreased;base of support, narrow     Comment (Gait/Stairs) pt with poor use of standard walker (RW not available for use); VCs for stepping pattern required with pt demo increased fatigue with use of standard walker which requires use of UEs to advance; pt able to demo head turns during ambulation and turn both directions without LOBs        Stairs Training    Margaret, Stairs not tested     Comment hihly fatigued with use of standard walker        Safety Issues, Functional Mobility    Impairments Affecting Function (Mobility) balance;endurance/activity tolerance        Balance    Static Sitting Balance WFL     Dynamic Sitting Balance WFL     Static Standing Balance mild impairment     Dynamic Standing Balance mild impairment     Comment, Balance poor use of walker        Motor Skills    Functional Endurance fair        Coping    Observed Emotional State calm;cooperative     Verbalized Emotional State acceptance     Trust Relationship/Rapport care explained;questions encouraged        AM-PAC (TM) -  Mobility (Current Function)    Turning from your back to your side while in a flat bed without using bedrails? 3 - A Little     Moving from lying on your back to sitting on the side of a flat bed without using bedrails? 3 - A Little     Moving to and from a bed to a chair? 3 - A Little     Standing up from a chair using your arms? 3 - A Little     To walk in a hospital room? 3 - A Little     Climbing 3-5 steps with a railing? 3 - A Little     AM-PAC (TM) Mobility Score 18        Assessment/Plan (PT)    Daily Outcome Statement pt overall closeS for transfers and touching assist for ambulation with standard walker. Pt challenged today to use UEs to advance walker as well as to perform head turns during ambulation and turn 360degrees each direction. Continues to be limited by activity tolerance and fatigue. Dispo rec remains SNF     Rehab Potential good, to achieve stated therapy goals     Therapy Frequency 3-5 times/wk     Planned Therapy Interventions balance training;bed mobility training;gait training;transfer training;strengthening               PT Assessment/Plan      Most Recent Value   PT Recommended Discharge Disposition skilled nursing facility at 02/23/2022 1248   Anticipated Equipment Needs at Discharge (PT) none  [owns appropriate DME] at 02/23/2022 1248   Patient/Family Therapy Goals Statement i prefer home but ill talk to my daughter about SNF at 02/22/2022 0956                    Education Documentation  Safety, taught by Maris King PT at 2/23/2022  2:11 PM.  Learner: Patient  Readiness: Acceptance  Method: Explanation  Response: Verbalizes Understanding  Comment: pt edu provided on energy conservation and proper use of standard walker    Home Safety, taught by Maris King PT at 2/23/2022  2:11 PM.  Learner: Patient  Readiness: Acceptance  Method: Explanation  Response: Verbalizes Understanding  Comment: pt edu provided on energy conservation and proper use of standard walker    Energy  Conservation, taught by Maris King, PT at 2/23/2022  2:11 PM.  Learner: Patient  Readiness: Acceptance  Method: Explanation  Response: Verbalizes Understanding  Comment: pt edu provided on energy conservation and proper use of standard walker          PT Goals      Most Recent Value   Bed Mobility Goal 1    Activity/Assistive Device bed mobility activities, all at 02/22/2022 0956   Davis independent at 02/22/2022 0956   Time Frame by discharge at 02/22/2022 0956   Progress/Outcome goal ongoing at 02/22/2022 0956   Transfer Goal 1    Activity/Assistive Device sit-to-stand/stand-to-sit at 02/22/2022 0956   Davis modified independence at 02/22/2022 0956   Time Frame by discharge at 02/22/2022 0956   Progress/Outcome goal ongoing at 02/22/2022 0956   Gait Training Goal 1    Activity/Assistive Device gait (walking locomotion), assistive device use at 02/22/2022 0956   Davis modified independence at 02/22/2022 0956   Distance 250 at 02/22/2022 0956   Time Frame 2 weeks at 02/22/2022 0956   Progress/Outcome goal ongoing at 02/22/2022 0956   Stairs Goal 1    Activity/Assistive Device ascending stairs, descending stairs at 02/22/2022 0956   Davis modified independence at 02/22/2022 0956   Number of Stairs 12 at 02/22/2022 0956   Time Frame 2 weeks at 02/22/2022 0956   Progress/Outcome goal ongoing at 02/22/2022 0956

## 2022-02-23 NOTE — DISCHARGE SUMMARY
Hospital Medicine Service -  Inpatient Discharge Summary        BRIEF OVERVIEW   Admitting Provider: Jefe Carlos MD  Attending Provider: Nevaeh Curtis MD Attending phys phone: [phone]    PCP: Chica Hardy [phone]    Admission Date: 2/18/2022  Discharge Date: 2/23/2022     DISCHARGE DIAGNOSES      Primary Discharge Diagnosis  Human metapneumovirus pneumonia    Secondary Discharge Diagnoses  Active Hospital Problems    Diagnosis Date Noted   • Human metapneumovirus pneumonia 02/21/2022     Priority: High   • GERD (gastroesophageal reflux disease) 02/20/2022   • Hyperlipidemia 02/20/2022   • Arthritis 02/20/2022   • Elevated troponin 02/19/2022   • Primary hypertension 02/19/2022   • Other specified hypothyroidism 08/18/2021      Resolved Hospital Problems    Diagnosis Date Noted Date Resolved   • Bronchopneumonia 02/19/2022 02/23/2022     Priority: High   • Hypoxia 02/18/2022 02/23/2022     Priority: High   • Hyponatremia 02/19/2022 02/19/2022   • Hypokalemia 02/19/2022 02/21/2022       Problem List on Day of Discharge  * Human metapneumovirus pneumonia  Assessment & Plan  Supportive care.   Improving.  No need for oxygen.     Arthritis  Assessment & Plan  Patient has degenerative arthritis.  Tylenol as needed for pain.  At home uses tramadol.  PDMP verified.  Resumed tramadol as needed for severe pain.    Hyperlipidemia  Assessment & Plan  Continue statin.    GERD (gastroesophageal reflux disease)  Assessment & Plan  Continue PPI.    Primary hypertension  Assessment & Plan  Blood pressures are acceptable while here.    Continue holding the thiazide diuretic.   Continue Losartan and Amlodipine.     Elevated troponin  Assessment & Plan  Likely demand ischemia in the setting of acute illness, not MI.      Other specified hypothyroidism  Assessment & Plan  Continue levothyroxine.       SUMMARY OF HOSPITALIZATION      Presenting Problem/History of Present Illness  This is a 89 y.o. year-old female  "admitted on 2/18/2022 with Hypoxia [R09.02]  Pneumonia due to infectious organism, unspecified laterality, unspecified part of lung [J18.9].    Merced Robin is a 89 y.o. female with PMH of GERD, hypothyroidism, HTN, breast cancer who was admitted on 2/18/2022 with cough and generalized weakness which started one week ago with fever in the setting of recent sick contact with her grandson two weeks ago. She denies any chest pain, abdominal pain, N/V/D, dysuria, or skin rashes but has some dyspnea. On admission, she was afebrile without leukocytosis. CTA chest showed Multifocal airspace disease greatest in the left lower lobe and peribronchial thickening on the left.     Hospital Course  Patient was admitted to medicine service. Blood cx were sent and were negative. Sputum cultures were negative. She was started on ceftriaxone and azithromycin initially pending culture results. Mycoplasma and chlamydia, legionella, RSV and SARS COV 2 were negative. We involved our ID specialist and a viral panel was requested that was positive for human metapneumovirus. Antibiotics were stopped. We continued supportive care. Her oxygenation improved. We were able to wean her off oxygen.     While here we noted that she was hypokalemic requiring supplementation while Bps were WNL without diuretic. We continued her without thiazide diuretic with plan to follow-up with PCP in 1 week to determine if safe to resume chlorthalidone at that time.     Exam on Day of Discharge  Physical Exam   Visit Vitals  /71 (BP Location: Right upper arm, Patient Position: Lying)   Pulse 88   Temp 36.8 °C (98.2 °F) (Axillary)   Resp 12   Ht 1.651 m (5' 5\")   Wt 54.9 kg (121 lb)   SpO2 98%   BMI 20.14 kg/m²     General:  Awake, alert.  No acute distress.  HEENT: Sclerae anicteric.  Moist mucus membranes.   Lungs:  Bilateral ronchi, no wheezing.   Cardiovascular:  Regular rate and rhythm.  No murmurs.  Abdomen:  Soft, non tender, non distended.  "   :NA  Extremities:  No edema bilaterally.  Neuro: Non-focal. Pleasant and cooperative.   Skin: faint erythematous rash on her back.   Psych: A/Ox3; full range affect    Consults During Admission  IP CONSULT TO INFECTIOUS DISEASE    DISCHARGE MEDICATIONS        Medication List      ASK your doctor about these medications    amLODIPine 5 mg tablet  Commonly known as: NORVASC  Take 5 mg by mouth daily.  Dose: 5 mg     calcium citrate-vitamin D3 315 mg-5 mcg (200 unit) per tablet  Commonly known as: CITRACAL+D  Take 1 tablet by mouth once daily.  Dose: 1 tablet     cetirizine 10 mg tablet  Commonly known as: ZyrTEC  Take 10 mg by mouth daily.  Dose: 10 mg     chlorthalidone 25 mg tablet  Commonly known as: HYGROTEN  Take 25 mg by mouth daily.  Dose: 25 mg     cyanocobalamin 100 mcg tablet  Commonly known as: VITAMIN B12  Take 100 mcg by mouth daily.  Dose: 100 mcg     DAILY MULTIVITAMIN-MINERALS tablet  Take 1 tablet by mouth daily.  Dose: 1 tablet  Generic drug: multivitamin with minerals     lansoprazole 30 mg disintegrating tablet  Commonly known as: PREVACID SOLUTAB  Take 30 mg by mouth daily before breakfast.  Dose: 30 mg     levothyroxine 75 mcg tablet  Commonly known as: SYNTHROID  Take 75 mcg by mouth daily.  Dose: 75 mcg     losartan 50 mg tablet  Commonly known as: COZAAR  Take 50 mg by mouth daily.  Dose: 50 mg     omeprazole OTC 20 mg EC tablet  Commonly known as: PriLOSEC OTC  Take 20 mg by mouth daily.  Dose: 20 mg     PARoxetine 20 mg tablet  Commonly known as: PAXIL  Take 20 mg by mouth daily.  Dose: 20 mg     rosuvastatin 10 mg tablet  Commonly known as: CRESTOR  Take 10 mg by mouth daily.  Dose: 10 mg                      PROCEDURES / LABS / IMAGING      Operative Procedures  NA    Other Procedures  NA    Pertinent Labs  CBC   CBC Results       02/20/22 02/19/22 02/18/22     0601 0424 2130    WBC 3.53 3.84 3.96    RBC 3.68 3.91 3.93    HGB 11.2 11.6 11.7    HCT 32.3 33.9 34.5    MCV 87.8 86.7 87.8     MCH 30.4 29.7 29.8    MCHC 34.7 34.2 33.9     159 171         BMP or CMP   BMP Results       02/21/22 02/20/22 02/19/22     0552 0601 0424     135 136    K 4.1 3.3 3.4    Cl 96 98 99    CO2 29 28 26    Glucose 100 106 112    BUN 21 16 12    Creatinine 0.6 0.5 0.6    Calcium 8.6 8.4 8.6    Anion Gap 11 9 11    EGFR >60.0 >60.0 >60.0        CMP Results       02/21/22 02/20/22 02/19/22     0552 0601 0424     135 136    K 4.1 3.3 3.4    Cl 96 98 99    CO2 29 28 26    Glucose 100 106 112    BUN 21 16 12    Creatinine 0.6 0.5 0.6    Calcium 8.6 8.4 8.6    Anion Gap 11 9 11    AST -- -- 37    ALT -- -- 20    Albumin -- -- 3.8    EGFR >60.0 >60.0 >60.0           INR              SARS-CoV-2 (COVID-19) (no units)   Date/Time Value   02/18/2022 2128 Negative       Pertinent Imaging  FLUOROSCOPY VIDEO SWALLOW WITH SPEECH    Result Date: 2/21/2022  IMPRESSION: 1. Mild oral dysphagia. 2. Moderate pharyngeal dysphagia with aspiration of thin liquids compensated for using the chin tuck swallow strategy. Dr. Leonardo Ochoa was present for this examination. The undersigned radiologist agrees only with the radiographic findings. Specific swallowing recommendations are solely the purview of the Speech Pathology Division. Maddie De Los Santos MA, CF-SLP in conjunction with Clemencia Levi MA, CCC-SLP/L     CT ANGIOGRAPHY CHEST PULMONARY EMBOLISM WITH IV CONTRAST    Result Date: 2/19/2022  IMPRESSION: 1.  Limited assessment due to significant motion artifact.. No large central pulmonary embolism. If there is continued concern consider repeat study when the patient can better tolerate the examination. 2.  Multifocal airspace disease greatest in the left lower lobe could be related to multifocal pneumonia, aspiration pneumonitis and/or atelectasis. 3.  Peribronchial thickening on the left with some narrowing possible endobronchial fluid in the left bronchi also raises the possibility of aspiration versus mucous. 4.  Mild  left hilar and subcarinal lymphadenopathy. This could be reactive to the airspace disease although recommend follow-up chest CT in perhaps in 8-12 weeks to document resolution. 5.  Additional findings discussed below. Findings are concordant with preliminary report provided by Dr. Lino at 12:56 AM 2/19/2022 COMMENT: Comparison: Chest x-ray from 2/18/2022 Technique: CTA of the chest pulmonary embolism protocol was performed with contrast.  3D MIP and/or volume rendered image reconstruction performed and reviewed. 115 mL Omnipaque 350 given. CT DOSE:  One or more dose reduction techniques (e.g. automated exposure control, adjustment of the mA and/or kV according to patient size, use of iterative reconstruction technique) utilized for this examination. FINDINGS: LUNG, PLEURA AND LARGE AIRWAYS: Motion artifact limits assessment. Trachea and the proximal bronchi on the right remain patent. There is peribronchial thickening and narrowing in the left lower lobe and lingula which is associated with patchy airspace consolidation in the left lower lobe which could be related to pneumonia. It is difficult to further define the bronchial findings due to the degree of motion artifact. However there does appear to be some endobronchial material within the left major bronchi as they course to the hilum possibly representing fluid. Aspiration should be considered. Some patchy airspace opacity versus atelectasis is also noted in the right middle lobe and minimally in the right lower lobe. There is no evidence of a pneumothorax. There may be trace left pleural fluid without significant effusion. VESSELS: Normal caliber main pulmonary artery. There is no main or lobar pulmonary embolism. The remaining pulmonary arteries are limited in their evaluation due to substantial motion artifact markedly degraded quality. Normal caliber of the thoracic aorta with no gross acute findings. At this chronic changes are noted throughout  including the coronary arteries. HEART: Heart is enlarged. No pericardial effusion. MEDIASTINUM AND ISABELLE: There is left hilar lymphadenopathy. For example on axial image 121 there is a 1.7 cm lymph node. Follow-up would be recommended document resolution. Mild subcarinal lymphadenopathy measuring 1.4 cm. Minimal hiatal hernia. CHEST WALL AND LOWER NECK: Postsurgical changes left axilla. UPPER ABDOMEN:Atheromatous changes. Limited assessment of the unenhanced motion degraded upper abdominal structures reveals no gross findings of concern. BONES: Demineralization. Spinal curvature. Dextroconvex scoliosis. Degenerative disease. No acute or suspicious findings. Bilateral shoulder reversed arthroplasties.     X-RAY CHEST 1 VIEW    Result Date: 2/19/2022  IMPRESSION: No acute cardiopulmonary process.      OUTPATIENT  FOLLOW-UP / REFERRALS / PENDING TESTS        Outpatient Follow-Up Appointments  Encounter Information    This patient does not currently have any appointments scheduled.         Referrals  No orders of the defined types were placed in this encounter.      Test Results Pending at Discharge  Unresulted Labs (From admission, onward)             Start     Ordered    02/23/22 1354  Basic metabolic panel  Once        Question:  Release to patient  Answer:  Immediate    02/23/22 1353    02/23/22 1354  Magnesium  Once        Question:  Release to patient  Answer:  Immediate    02/23/22 1353    02/19/22 0428  Streptococcus pneumoniae IgG Antibody (14 Serotypes)  Once        Question:  Release to patient  Answer:  Immediate    02/19/22 0211    02/18/22 2339  Sputum culture / smear Expectorated Sputum  Once        Question:  Release to patient  Answer:  Immediate    02/18/22 2338    02/18/22 2339  Sputum Gram Stain (Lab Only) Expectorated Sputum  PROCEDURE ONCE        Question:  Release to patient  Answer:  Immediate    02/18/22 2338                Important Issues to Address in Follow-Up  See discharge  instructions    DISCHARGE DISPOSITION AND DESTINATION      Disposition:  SNF  Destination:  SNF                            Code Status At Discharge: DNR (A.N.D.)    Physician Order for Life-Sustaining Treatment Document Status      No documents found                 Encounter time 40 mts.

## 2022-02-23 NOTE — PROGRESS NOTES
Patient:  Merced Robin  Location:  [facility] 3B 0308  MRN:  [id number]  Today's date:  2/23/2022     RN cleared patient for therapy. Session ended with patient returned to recliner w/ chair alarm on, call bell and room phone in reach and all other needs met. NAD. HR elevated during functional mobility however returned to < 100 BPM. RN made aware of patient performance.      Merced is a 89 y.o. female admitted on 2/18/2022 with Hypoxia [R09.02]  Pneumonia due to infectious organism, unspecified laterality, unspecified part of lung [J18.9]. Principal problem is Hypoxia.    Past Medical History  Merced has a past medical history of Breast cancer (CMS/HCC), Hypertension, and Hypothyroidism.    History of Present Illness   Pt consumes a regular diet with thin liquids at baseline.  Presented to ER on 2/18/2022 with generalized malaise, cough and fever.  Patient reports that her grandson was recently ill with a cough and fever and subsequently she started to have the same symptoms. She states that her symptoms consist of cough, congestion, fevers, chills, fatigue, lack of appetite, generalized malaise and shortness of breath with exertion. She states that her daughter noted that the patient was having shortness of breath at rest and that she developed a fever of Tmax 103 today leading to her to come to the ER for further evaluation. It was noted that the patient was hypoxic too 88% on arrival of EMS and improved with supplemental O2 via nasal cannula at 2 L. Diagnosed with viral pneumonia.      OT Vitals    Date/Time Pulse SpO2 Pt Activity O2 Therapy BP Who   02/23/22 0941 89 95 % At rest None (Room air) 123/63 DB   02/23/22 0950 149 -- Walking None (Room air) -- DB   02/23/22 0956 98 94 % At rest None (Room air) 150/68 DB      OT Pain    Date/Time Pain Type Rating: Rest Rating: Activity [facility]   02/23/22 0941 Pain Assessment 0 - no pain 0 - no pain DB          Prior Living Environment      Most Recent Value   People in  Home child(gricelda), adult   Current Living Arrangements home   Home Accessibility stair glide, stairs to enter home (Group)   Living Environment Comment lives in 3 story home with daughter, 5 [address] with BL rail, 2nf floor bed/ bath with powder room on 1st floor   Number of Stairs, Main Entrance 5   Stair Railings, Main Entrance railings on both sides of stairs        Prior Level of Function      Most Recent Value   Ambulation assistive equipment   Transferring assistive equipment   Toileting independent   Bathing independent   Dressing independent   Eating independent   Communication understands/communicates without difficulty   Swallowing swallows foods/liquids without difficulty   Baseline Diet/Method of Nutritional Intake regular, thin liquids   Past History of Dysphagia Pt c/o occasional regurgitation of food and globus sensation at the level of the suprasternal notch   Prior Level of Function Comment Pt has AD at home, however, does not use frequently   Assistive Device Currently Used at Home commode, 3-in-1, cane, straight, stair glide, walker, front-wheeled        Occupational Profile      Most Recent Value   Reason for Services/Referral ADL/functional mobility dysfx 2/2 viral pneumonia/hypoxia   Performance Patterns Likes to take daily walk down to the mailbox each day to retrieve mail   Environmental Supports and Barriers Pt lives at home w/ daughter   Patient Goals To get better           OT Evaluation and Treatment - 02/23/22 0939        OT Time Calculation    Start Time 0939     Stop Time 0957     Time Calculation (min) 18 min        Session Details    Document Type daily treatment/progress note     Mode of Treatment occupational therapy        General Information    Patient Profile Reviewed yes     Onset of Illness/Injury or Date of Surgery 02/18/22     Referring Physician Kirsten     Patient/Family/Caregiver Comments/Observations RN cleared patient for therapy     General Observations of Patient Rec'd OOB in  chair, agreeable and pleasant     Existing Precautions/Restrictions airborne;contact;droplet;fall        Cognition/Psychosocial    Affect/Mental Status (Cognition) WFL     Orientation Status (Cognition) oriented x 4     Follows Commands (Cognition) WFL     Cognitive Function safety deficit     Safety Deficit (Cognition) minimal deficit;awareness of need for assistance;insight into deficits/self-awareness;judgment;problem-solving;safety precautions awareness;safety precautions follow-through/compliance     Comment, Cognition Pt w/ increased awareness of deficits for today's session, agreeable to use of AD. Cues for safety provided throughout session.        Bed Mobility    Comment (Bed Mobility) Rec'd OOB        Sit to Stand Transfer    [place], Sit to Stand Transfer minimum assist (75% or more patient effort);1 person assist;verbal cues     Verbal Cues hand placement;maintaining center of gravity over base of support;preparatory posture;proper use of assistive device;safety;technique     Assistive Device walker, front-wheeled     Comment From chair, (-) SOB or LOB but A required at trunk due to dec endurance, pt cued for UE placement to support self in pushing up from armrests        Stand to Sit Transfer    [place], Stand to Sit Transfer minimum assist (75% or more patient effort);1 person assist;verbal cues     Verbal Cues hand placement;maintaining center of gravity over base of support;preparatory posture;proper use of assistive device;safety;technique     Assistive Device walker, front-wheeled     Comment To chair, (-) SOB or LOB but A required at trunk due to dec endurance, pt cued for UE placement to support self in descending back into chair, fair eccentric control        Grooming    Self-Performance washes, rinses and dries hands;washes, rinses and dries face;oral care (brushing teeth, cleaning dentures)     [place] touching/steadying assist;close supervision     Position sink side;supported  standing     Setup Assistance obtain supplies;open containers     Adaptive Equipment none     Comment Pt tolerated sinkside standing for 3-part morning ADL routine, (-) LOB as patient supported herself w/ her L UE on sink, no leaning anterior trunk against sink observed. (-) LOB or reports of SOB, cues and encouragement provided for container management, fair [facility]        AM-PAC (TM) - ADL (Current Function)    Putting on and taking off regular lower body clothing? 3 - A Little     Bathing? 3 - A Little     Toileting? 3 - A Little     Putting on/taking off regular upper body clothing? 3 - A Little     How much help for taking care of personal grooming? 3 - A Little     Eating meals? 4 - None     AM-PAC (TM) ADL Score 19        Assessment/Plan (OT)    Daily Outcome Statement OT tx session complete. Pt pleasant and cooperative for duration of session. Min A x 1 for sit <> stand transfx due to dec functional endurance. Close S w/ steady assist for standing sinkside 3-step morning ADLs (approx 5 minutes of standing). Pt HR elevated to 149 BPM during functional mobility, returned < 100 BPM once returned to sitting in chair. Pt w/ improved use of RW, cues still provided for safety and insight into deficits/need for assistance. Acute OT continues to rec d/c to SNF once medically appropriate as patient is still not at functional baseline. Acute OT to follow.               OT Assessment/Plan      Most Recent Value   OT Recommended Discharge Disposition skilled nursing facility at 02/23/2022 0939   Anticipated Equipment Needs At Discharge (OT) --  [TBD at SNF,  pt w/ SPC and FWW at home] at 02/22/2022 0957   Patient/Family Therapy Goal Statement To get better and get strength back at 02/23/2022 0939                         OT Goals      Most Recent Value   Bed Mobility Goal 1    Activity/Assistive Device bed mobility activities, all at 02/22/2022 0957   [place] independent at 02/22/2022 0957   Time Frame by discharge at  02/22/2022 0957   Progress/Outcome goal ongoing at 02/22/2022 0957   Transfer Goal 1    Activity/Assistive Device all transfers at 02/22/2022 0957   [place] modified independence at 02/22/2022 0957   Time Frame by discharge at 02/22/2022 0957   Progress/Outcome goal ongoing at 02/22/2022 0957   Dressing Goal 1    Activity/Adaptive Equipment dressing skills, all at 02/22/2022 0957   [place] independent at 02/22/2022 0957   Time Frame by discharge at 02/22/2022 0957   Progress/Outcome goal ongoing at 02/22/2022 0957   Toileting Goal 1    Activity/Assistive Device toileting skills, all at 02/22/2022 0957   [place] modified independence at 02/22/2022 0957   Time Frame by discharge at 02/22/2022 0957   Progress/Outcome goal ongoing at 02/22/2022 0957

## 2022-02-23 NOTE — PLAN OF CARE
Plan of Care Review  Plan of Care Reviewed With: patient  Progress: improving  Outcome Summary: Pt denies any pain or discomfort. VSS.

## 2022-02-23 NOTE — PROGRESS NOTES
Patient:  Merced Robin  Location:  [facility] 3B 0308  MRN:  [id number]  Today's date:  2/23/2022     Speech Pathology: Therapy session    SLP Diagnosis: known mild oral and moderate pharyngeal dysphagia    Recommendations:  1. Appears safe to continue regular diet (L7) with thin liquids (L0) using chin tuck.  2. Distant supervision for tray set up- encourage use of strategy whenever possible.   3. Strict aspiration/GERD precautions for safety.   4. ST will continue to follow.    Summary/Handoff:  Daily Outcome Statement: ST f/u completed at bedside. Assessed use of chin tuck swallow strategy with competing stimuli during conversation with pt. Pt failed to use strategy in one instance but coughed following the sip. Intact sensory response reminded pt to try again using chin tuck. ST continued to train chin tuck and educate pt on the importance of using strategy. Appears safe to continue regular diet (L7) with thin liquids (L0) using chin tuck. Distant supervision for tray set up- encourage use of strategy whenever possible. Strict aspiration/GERD precautions for safety. ST will continue to follow.    Maddie De Los Santos MA, CF-SLP         Dietary Orders   (From admission, onward)             Start     Ordered    02/21/22 1427  Adult Diet Regular; 3-4gm Sodium CARLO; Thin Liquids; RD/LDN may adjust order  Diet effective now        Comments: When drinking recommend using chin tuck compensatory strategy with every sip.   References:    IDDSI Diet reference   Question Answer Comment   Diet Texture Regular    Sodium Restriction: 3-4gm Sodium CARLO    Fluid Consistency: Thin Liquids    Delegation of Authority. Diet orders written by PA/Jhoan may not be adjusted by RD/LDNs. RD/LDN may adjust order        02/21/22 1426                Results from last 7 days   Lab Units 02/20/22  0601 02/19/22  0424 02/18/22  2130   WBC K/uL 3.53* 3.84 3.96   HEMOGLOBIN g/dL 11.2* 11.6* 11.7*   HEMATOCRIT % 32.3* 33.9* 34.5*   PLATELETS  K/uL 159 159 171          RN cleared SLP to assess/work with patient. Following completion of session, pt remained in bed as found with call bell in reach.      Merced is a 89 y.o. female admitted on 2/18/2022 with Hypoxia [R09.02]  Pneumonia due to infectious organism, unspecified laterality, unspecified part of lung [J18.9]. Principal problem is Hypoxia.    Past Medical History  Merced has a past medical history of Breast cancer (CMS/HCC), Hypertension, and Hypothyroidism.    History of Present Illness   Pt consumes a regular diet with thin liquids at baseline.  Presented to ER on 2/18/2022 with generalized malaise, cough and fever.  Patient reports that her grandson was recently ill with a cough and fever and subsequently she started to have the same symptoms. She states that her symptoms consist of cough, congestion, fevers, chills, fatigue, lack of appetite, generalized malaise and shortness of breath with exertion. She states that her daughter noted that the patient was having shortness of breath at rest and that she developed a fever of Tmax 103 today leading to her to come to the ER for further evaluation. It was noted that the patient was hypoxic too 88% on arrival of EMS and improved with supplemental O2 via nasal cannula at 2 L. Diagnosed with viral pneumonia.      SLP Pain    Date/Time Pain Type Rating: Rest Rating: Activity [facility]   02/23/22 0931 Pain Assessment 0 - no pain 0 - no pain CO          Prior Living Environment      Most Recent Value   People in Home child(gricelda), adult   Current Living Arrangements home   Home Accessibility stair glide, stairs to enter home (Group)   Living Environment Comment lives in 3 story home with daughter, 5 [address] with BL rail, 2nf floor bed/ bath with powder room on 1st floor   Number of Stairs, Main Entrance 5   Stair Railings, Main Entrance railings on both sides of stairs        Prior Level of Function      Most Recent Value   Ambulation assistive equipment   Transferring  assistive equipment   Toileting independent   Bathing independent   Dressing independent   Eating independent   Communication understands/communicates without difficulty   Swallowing swallows foods/liquids without difficulty   Baseline Diet/Method of Nutritional Intake regular, thin liquids   Past History of Dysphagia Pt c/o occasional regurgitation of food and globus sensation at the level of the suprasternal notch   Prior Level of Function Comment Pt has AD at home, however, does not use frequently   Assistive Device Currently Used at Home commode, 3-in-1, cane, straight, stair glide, walker, front-wheeled           SLP Evaluation and Treatment - 02/23/22 0931        SLP Time Calculation    Start Time 0931     Stop Time 0939     Time Calculation (min) 8 min        Session Details    Document Type daily treatment/progress note     Mode of Treatment speech language pathology        General Information    Patient Profile Reviewed yes     General Observations of Patient ST f/u completed at bedside.     Existing Precautions/Restrictions aspiration;fall        Cognition/Psychosocial    Orientation Status (Cognition) oriented x 4        Functional Communication Measures    FCM: Swallowing 5-->Level 5        General Swallowing Observations    Current Diet/Method of Nutritional Intake regular;thin liquids     Signs/Symptoms of Aspiration (Current Diet) none     Respiratory Support (General Swallowing Observations) none        Food and Liquid Trials (NIS)    Patient Positioning HOB elevated (specify degrees)     Oral Intake/Feeding Performance independent/appropriate self-feeding skills     Liquid Consistencies Evaluated thin liquids     Thin Liquids straw sips     Comment, Thin Liquids chin tuck     Food Consistencies Evaluated regular     Regular intact     Oral Preparatory Phase of Swallow mastication, slow but effective;suspect posterior bolus leak     Oral Phase of Swallow delayed anterior-posterior transit      Pharyngeal Phase of Swallow coughing after swallow     Comment, Pharyngeal Phase cough x1 without chin tuck     Esophageal Phase of Swallow no clinical symptoms     SpO2 Level room air        Swallowing Recommendations    Diet Consistency Recommendations regular;thin liquids     Medication Administration whole with pureed     Supervision Level for Intake distant supervision needed     Feeding/Delivery Recommendations allow patient to feed self if maintaining safety     Posture Recommendations fully upright in chair/chair mode of bed     Swallowing Strategies chin tuck        Swallowing Intervention    Dysphagia/Swallowing Interventions monitor tolerance of;current diet without evidence of aspiration;advanced diet/liquid texture trials;compensatory swallowing strategies        AM-PAC (TM) - Cognition (Current Function)    Following/understanding a 10-15 minute speech or presentation? 4 - None     Understanding familiar people during ordinary conversations? 4 - None     Remembering to take medications at the appropriate time? 4 - None     Remembering where things were placed or put away? 4 - None     Remembering a list of 3 or 4 errands without writing it down? 4 - None     Taking care of complicated tasks? 4 - None     AM-PAC (TM) Cognition Score 24        Assessment/Plan (SLP)    Daily Outcome Statement ST f/u completed at bedside. Assessed use of chin tuck swallow strategy with competing stimuli during conversation with pt. Pt failed to use strategy in one instance but coughed following the sip. Intact sensory response reminded pt to try again using chin tuck. ST continued to train chin tuck and educate pt on the importance of using strategy. Appears safe to continue regular diet (L7) with thin liquids (L0) using chin tuck. Distant supervision for tray set up- encourage use of strategy whenever possible. Strict aspiration/GERD precautions for safety. ST will continue to follow.     Rehab Potential good, to achieve  stated therapy goals     Therapy Frequency 1-2 times/wk     Planned Therapy Interventions dysphagia therapy               SLP Assessment/Plan      Most Recent Value   SLP Diagnosis known mild oral and moderate pharyngeal dysphagia at 02/23/2022 0931   Patient/Family Therapy Goal Statement to improve breathing at 02/20/2022 0813                    SLP Goals      Most Recent Value   Oral Nutrition/Hydration Goal    Oral Nutrition/Hydration Goal Patient will tolerate least restrictive diet without overt s/sx of aspiration at 02/20/2022 0813   Time Frame by discharge at 02/20/2022 0813   Progress/Outcome goal ongoing at 02/23/2022 0931

## 2022-02-24 VITALS
WEIGHT: 121.6 LBS | BODY MASS INDEX: 20.26 KG/M2 | TEMPERATURE: 97.4 F | DIASTOLIC BLOOD PRESSURE: 68 MMHG | RESPIRATION RATE: 18 BRPM | HEART RATE: 81 BPM | HEIGHT: 65 IN | SYSTOLIC BLOOD PRESSURE: 128 MMHG | OXYGEN SATURATION: 91 %

## 2022-02-24 LAB
BACTERIA BLD CULT: NORMAL
BACTERIA BLD CULT: NORMAL
SARS-COV-2 RNA RESP QL NAA+PROBE: NEGATIVE

## 2022-02-24 PROCEDURE — 92526 ORAL FUNCTION THERAPY: CPT | Mod: GN

## 2022-02-24 PROCEDURE — 25000000 HC PHARMACY GENERAL: Performed by: HOSPITALIST

## 2022-02-24 PROCEDURE — 63700000 HC SELF-ADMINISTRABLE DRUG: Performed by: HOSPITALIST

## 2022-02-24 PROCEDURE — 63600000 HC DRUGS/DETAIL CODE: Performed by: HOSPITALIST

## 2022-02-24 PROCEDURE — U0002 COVID-19 LAB TEST NON-CDC: HCPCS | Performed by: HOSPITALIST

## 2022-02-24 PROCEDURE — 99238 HOSP IP/OBS DSCHRG MGMT 30/<: CPT | Performed by: HOSPITALIST

## 2022-02-24 PROCEDURE — 200200 PR NO CHARGE: Performed by: HOSPITALIST

## 2022-02-24 RX ORDER — GUAIFENESIN 600 MG/1
1200 TABLET, EXTENDED RELEASE ORAL 2 TIMES DAILY
Qty: 40 TABLET | Refills: 0
Start: 2022-02-24 | End: 2022-03-06

## 2022-02-24 RX ORDER — IPRATROPIUM BROMIDE AND ALBUTEROL SULFATE 2.5; .5 MG/3ML; MG/3ML
3 SOLUTION RESPIRATORY (INHALATION) EVERY 6 HOURS
Qty: 360 ML | Refills: 0
Start: 2022-02-24 | End: 2022-03-26

## 2022-02-24 RX ADMIN — IPRATROPIUM BROMIDE AND ALBUTEROL SULFATE 3 ML: .5; 3 SOLUTION RESPIRATORY (INHALATION) at 13:45

## 2022-02-24 RX ADMIN — IPRATROPIUM BROMIDE AND ALBUTEROL SULFATE 3 ML: .5; 3 SOLUTION RESPIRATORY (INHALATION) at 00:58

## 2022-02-24 RX ADMIN — GUAIFENESIN 1200 MG: 600 TABLET, EXTENDED RELEASE ORAL at 08:36

## 2022-02-24 RX ADMIN — ROSUVASTATIN CALCIUM 10 MG: 10 TABLET, FILM COATED ORAL at 08:36

## 2022-02-24 RX ADMIN — Medication 100 MCG: at 08:36

## 2022-02-24 RX ADMIN — PANTOPRAZOLE SODIUM 40 MG: 40 TABLET, DELAYED RELEASE ORAL at 08:36

## 2022-02-24 RX ADMIN — LOSARTAN POTASSIUM 50 MG: 50 TABLET, FILM COATED ORAL at 08:36

## 2022-02-24 RX ADMIN — PAROXETINE 20 MG: 20 TABLET, FILM COATED ORAL at 08:36

## 2022-02-24 RX ADMIN — AMLODIPINE BESYLATE 5 MG: 5 TABLET ORAL at 08:36

## 2022-02-24 RX ADMIN — LEVOTHYROXINE SODIUM 75 MCG: 75 TABLET ORAL at 05:58

## 2022-02-24 RX ADMIN — HEPARIN SODIUM 5000 UNITS: 5000 INJECTION, SOLUTION INTRAVENOUS; SUBCUTANEOUS at 13:44

## 2022-02-24 RX ADMIN — HEPARIN SODIUM 5000 UNITS: 5000 INJECTION, SOLUTION INTRAVENOUS; SUBCUTANEOUS at 05:58

## 2022-02-24 RX ADMIN — IPRATROPIUM BROMIDE AND ALBUTEROL SULFATE 3 ML: .5; 3 SOLUTION RESPIRATORY (INHALATION) at 05:58

## 2022-02-24 ASSESSMENT — COGNITIVE AND FUNCTIONAL STATUS - GENERAL
TAKING CARE OF COMPLICATED TASKS: 4 - NONE
FOLLOWS FAMILIAR CONVERSATION: 4 - NONE
REMEMBERING WHERE THINGS ARE: 4 - NONE
UNDERSTANDING 10 TO 15 MIN SPEECH: 4 - NONE
REMEMBERING TO TAKE MEDICATION: 4 - NONE
REMEMBERING 5 ERRANDS WITH NO LIST: 4 - NONE

## 2022-02-24 NOTE — PATIENT CARE CONFERENCE
Care Progression Rounds Note  Date: 2/24/2022  Time: 12:20 PM     Patient Name: Merced Robin     Medical Record Number: [id number]   YOB: 1931  Sex: Female      Room/Bed: 0308    Admitting Diagnosis: Hypoxia [R09.02]  Pneumonia due to infectious organism, unspecified laterality, unspecified part of lung [J18.9]   Admit Date/Time: 2/18/2022  9:08 PM    Primary Diagnosis: Human metapneumovirus pneumonia  Principal Problem: Human metapneumovirus pneumonia    GMLOS: 3.1  Anticipated Discharge Date: 2/24/2022    AM-PAC:  Mobility Score: 18    Discharge Planning:  Current Living Arrangements: home  Concerns to be Addressed: care coordination/care conferences,discharge planning  Anticipated Discharge Disposition: skilled nursing facility    Barriers to Discharge:  None    Comments:  will dc to [facility] for STR today    Participants:  pharmacy,,physical therapy,dietitian/nutrition services,occupational therapy,nursing,social work/services

## 2022-02-24 NOTE — DISCHARGE INSTRUCTIONS
Community-Acquired Pneumonia, Adult  Pneumonia is an infection of the lungs. It causes irritation and swelling in the airways of the lungs. Mucus and fluid may also build up inside the airways. This may cause coughing and trouble breathing.  One type of pneumonia can happen while you are in a hospital. A different type can happen when you are not in a hospital (community-acquired pneumonia).  What are the causes?    This condition is caused by germs (viruses, bacteria, or fungi). Some types of germs can spread from person to person. Pneumonia is not thought to spread from person to person.  What increases the risk?  You are more likely to develop this condition if:  · You have a long-term (chronic) disease, such as:  ? Disease of the lungs. This may be chronic obstructive pulmonary disease (COPD) or asthma.  ? Heart failure.  ? Cystic fibrosis.  ? Diabetes.  ? Kidney disease.  ? Sickle cell disease.  ? HIV.  · You have other health problems, such as:  ? Your body's defense system (immune system) is weak.  ? A condition that may cause you to breathe in fluids from your mouth and nose.  · You had your spleen taken out.  · You do not take good care of your teeth and mouth (poor dental hygiene).  · You use or have used tobacco products.  · You travel where the germs that cause this illness are common.  · You are near certain animals or the places they live.  · You are older than 65 years of age.  What are the signs or symptoms?  Symptoms of this condition include:  · A cough.  · A fever.  · Sweating or chills.  · Chest pain, often when you breathe deeply or cough.  · Breathing problems, such as:  ? Fast breathing.  ? Trouble breathing.  ? Shortness of breath.  · Feeling tired (fatigued).  · Muscle aches.  How is this treated?  Treatment for this condition depends on many things, such as:  · The cause of your illness.  · Your medicines.  · Your other health problems.  Most adults can be treated at home. Sometimes,  treatment must happen in a hospital.  · Treatment may include medicines to kill germs.  · Medicines may depend on which germ caused your illness.  Very bad pneumonia is rare. If you get it, you may:  · Have a machine to help you breathe.  · Have fluid taken away from around your lungs.  Follow these instructions at home:  Medicines  · Take over-the-counter and prescription medicines only as told by your doctor.  · Take cough medicine only if you are losing sleep. Cough medicine can keep your body from taking mucus away from your lungs.  · If you were prescribed an antibiotic medicine, take it as told by your doctor. Do not stop taking the antibiotic even if you start to feel better.  Lifestyle         · Do not drink alcohol.  · Do not use any products that contain nicotine or tobacco, such as cigarettes, e-cigarettes, and chewing tobacco. If you need help quitting, ask your doctor.  · Eat a healthy diet. This includes a lot of vegetables, fruits, whole grains, low-fat dairy products, and low-fat (lean) protein.  General instructions    · Rest a lot. Sleep for at least 8 hours each night.  · Sleep with your head and neck raised. Put a few pillows under your head or sleep in a reclining chair.  · Return to your normal activities as told by your doctor. Ask your doctor what activities are safe for you.  · Drink enough fluid to keep your pee (urine) pale yellow.  · If your throat is sore, rinse your mouth often with salt water. To make salt water, dissolve ½-1 tsp (3-6 g) of salt in 1 cup (237 mL) of warm water.  · Keep all follow-up visits as told by your doctor. This is important.  How is this prevented?  You can lower your risk of pneumonia by:  · Getting the pneumonia shot (vaccine). These shots have different types and schedules. Ask your doctor what works best for you. Think about getting this shot if:  ? You are older than 65 years of age.  ? You are 19-65 years of age and:  § You are being treated for  cancer.  § You have long-term lung disease.  § You have other problems that affect your body's defense system. Ask your doctor if you have one of these.  · Getting your flu shot every year. Ask your doctor which type of shot is best for you.  · Going to the dentist as often as told.  · Washing your hands often with soap and water for at least 20 seconds. If you cannot use soap and water, use hand .  Contact a doctor if:  · You have a fever.  · You lose sleep because your cough medicine does not help.  Get help right away if:  · You are short of breath and this gets worse.  · You have more chest pain.  · Your sickness gets worse. This is very serious if:  ? You are an older adult.  ? Your body's defense system is weak.  · You cough up blood.  These symptoms may be an emergency. Do not wait to see if the symptoms will go away. Get medical help right away. Call your local emergency services (911 in the [place] Do not drive yourself to the hospital.  Summary  · Pneumonia is an infection of the lungs.  · Community-acquired pneumonia affects people who have not been in the hospital. Certain germs can cause this infection.  · This condition may be treated with medicines that kill germs.  · For very bad pneumonia, you may need a hospital stay and treatment to help with breathing.  This information is not intended to replace advice given to you by your health care provider. Make sure you discuss any questions you have with your health care provider.  Document Revised: 09/29/2020 Document Reviewed: 09/29/2020  [employer] Patient Education © 2021 [employer]  We are holding chlorthalidone for now.  Follow-up with primary care physician for further evaluation and management.

## 2022-02-24 NOTE — NURSING NOTE
Patient made aware of discharge to facility and agreeable. Belongings gathered. IV and telemetry removed. Report given to Christiane at [facility]. Waiting transport.

## 2022-02-24 NOTE — PROGRESS NOTES
Patient:  Merced Robin  Location:  [facility] 3B 0308  MRN:  [id number]  Today's date:  2/24/2022     Speech Pathology: Therapy session    SLP Diagnosis: known mild oral and moderate pharyngeal dysphagia; h/o GERD    Recommendations:  1. Recommend continuing regular diet (L7) with thin liquids (L0) using chin tuck with every sip.   2. Close supervision at mealtimes for tray set up and to encourage use of strategy.   3. Strict aspiration/GERD precautions as pt has known history of reflux and is at increased risk of reverse aspiration.   4. ST will continue to follow pending d/c. Recommend continued ST at [facility].    Summary/Handoff:  Daily Outcome Statement: ST f/u completed at bedside. Pt asked about safest way to take pills as it is difficult to do chin tuck wiht pills in water. Recommend pills whole in apple sauce- pt open to it. Continued to educate on the importance of consistent use of chin tuck for airway protection. Pt reports understanding but still occasionally forgets. ST provided mildly thick liquids as alternative, pt tasted and refused. Pt demonstrating appropriate use of chin tuck with supervision. Recommend continuing regular diet with thin liquids using chin tuck with every sip. Close supervision at mealtimes for tray set up and to encourage use of strategy. Strict aspiration/GERD precautions as pt has known history of reflux and is at increased risk of reverse aspiration. ST will continue to follow pending d/c. Recommend continued ST at [facility].    Maddie De Los Santos MA, CF-SLP         Dietary Orders   (From admission, onward)             Start     Ordered    02/21/22 1427  Adult Diet Regular; 3-4gm Sodium CARLO; Thin Liquids; RD/LDN may adjust order  Diet effective now        Comments: When drinking recommend using chin tuck compensatory strategy with every sip.   References:    IDDSI Diet reference   Question Answer Comment   Diet Texture Regular    Sodium Restriction: 3-4gm Sodium CARLO    Fluid  Consistency: Thin Liquids    Delegation of Authority. Diet orders written by PA/Jhoan may not be adjusted by RD/LDNs. RD/LDN may adjust order        02/21/22 1426                Results from last 7 days   Lab Units 02/20/22  0601 02/19/22  0424 02/18/22  2130   WBC K/uL 3.53* 3.84 3.96   HEMOGLOBIN g/dL 11.2* 11.6* 11.7*   HEMATOCRIT % 32.3* 33.9* 34.5*   PLATELETS K/uL 159 159 171          RN cleared SLP to assess/work with patient. Following completion of session, pt remained in bed as found with call bell in reach.      Merced is a 89 y.o. female admitted on 2/18/2022 with Hypoxia [R09.02]  Pneumonia due to infectious organism, unspecified laterality, unspecified part of lung [J18.9]. Principal problem is Hypoxia.    Past Medical History  Merced has a past medical history of Breast cancer (CMS/HCC), Hypertension, and Hypothyroidism.    History of Present Illness   Pt consumes a regular diet with thin liquids at baseline.  Presented to ER on 2/18/2022 with generalized malaise, cough and fever.  Patient reports that her grandson was recently ill with a cough and fever and subsequently she started to have the same symptoms. She states that her symptoms consist of cough, congestion, fevers, chills, fatigue, lack of appetite, generalized malaise and shortness of breath with exertion. She states that her daughter noted that the patient was having shortness of breath at rest and that she developed a fever of Tmax 103 today leading to her to come to the ER for further evaluation. It was noted that the patient was hypoxic too 88% on arrival of EMS and improved with supplemental O2 via nasal cannula at 2 L. Diagnosed with viral pneumonia.      SLP Pain    Date/Time Pain Type Rating: Rest Rating: Activity Who   02/24/22 0842 Pain Assessment 0 - no pain 0 - no pain CO          Prior Living Environment      Most Recent Value   People in Home child(gricelda), adult   Current Living Arrangements home   Home Accessibility stair glide,  stairs to enter home (Group)   Living Environment Comment lives in 3 story home with daughter, 5 [address] with BL rail, 2nf floor bed/ bath with powder room on 1st floor   Number of Stairs, Main Entrance 5   Stair Railings, Main Entrance railings on both sides of stairs        Prior Level of Function      Most Recent Value   Ambulation assistive equipment   Transferring assistive equipment   Toileting independent   Bathing independent   Dressing independent   Eating independent   Communication understands/communicates without difficulty   Swallowing swallows foods/liquids without difficulty   Baseline Diet/Method of Nutritional Intake regular, thin liquids   Past History of Dysphagia Pt c/o occasional regurgitation of food and globus sensation at the level of the suprasternal notch   Prior Level of Function Comment Pt has AD at home, however, does not use frequently   Assistive Device Currently Used at Home commode, 3-in-1, cane, straight, stair glide, walker, front-wheeled           SLP Evaluation and Treatment - 02/24/22 0842        SLP Time Calculation    Start Time 0842     Stop Time 0855     Time Calculation (min) 13 min        Session Details    Document Type daily treatment/progress note     Mode of Treatment speech language pathology        General Information    Patient Profile Reviewed yes     General Observations of Patient ST f/u completed at bedside.     Existing Precautions/Restrictions aspiration;fall        Cognition/Psychosocial    Orientation Status (Cognition) oriented x 4        Functional Communication Measures    FCM: Swallowing 5-->Level 5        General Swallowing Observations    Current Diet/Method of Nutritional Intake regular;thin liquids     Signs/Symptoms of Aspiration (Current Diet) none     Respiratory Support (General Swallowing Observations) none        Food and Liquid Trials (NIS)    Patient Positioning other (see comments)   edge of bed    Oral Intake/Feeding Performance  independent/appropriate self-feeding skills     Liquid Consistencies Evaluated thin liquids     Thin Liquids straw sips     Comment, Thin Liquids chin tuck     Food Consistencies Evaluated regular     Regular intact     Oral Preparatory Phase of Swallow mastication, slow but effective;suspect posterior bolus leak     Oral Phase of Swallow delayed anterior-posterior transit     Pharyngeal Phase of Swallow no clinical symptoms     Esophageal Phase of Swallow no clinical symptoms     SpO2 Level room air        Swallowing Recommendations    Diet Consistency Recommendations regular;thin liquids     Medication Administration whole with pureed     Supervision Level for Intake distant supervision needed     Feeding/Delivery Recommendations allow patient to feed self if maintaining safety     Posture Recommendations fully upright in chair/chair mode of bed     Swallowing Strategies chin tuck        Swallowing Intervention    Dysphagia/Swallowing Interventions monitor tolerance of;current diet without evidence of aspiration;advanced diet/liquid texture trials;compensatory swallowing strategies        AM-PAC (TM) - Cognition (Current Function)    Following/understanding a 10-15 minute speech or presentation? 4 - None     Understanding familiar people during ordinary conversations? 4 - None     Remembering to take medications at the appropriate time? 4 - None     Remembering where things were placed or put away? 4 - None     Remembering a list of 3 or 4 errands without writing it down? 4 - None     Taking care of complicated tasks? 4 - None     AM-PAC (TM) Cognition Score 24        Assessment/Plan (SLP)    Daily Outcome Statement ST f/u completed at bedside. Pt asked about safest way to take pills as it is difficult to do chin tuck wiht pills in water. Recommend pills whole in apple sauce- pt open to it. Continued to educate on the importance of consistent use of chin tuck for airway protection. Pt reports understanding but  still occasionally forgets. ST provided mildly thick liquids as alternative, pt tasted and refused. Pt demonstrating appropriate use of chin tuck with supervision. Recommend continuing regular diet with thin liquids using chin tuck with every sip. Close supervision at mealtimes for tray set up and to encourage use of strategy. Strict aspiration/GERD precautions as pt has known history of reflux and is at increased risk of reverse aspiration. ST will continue to follow pending d/c. Recommend continued ST at SNF.     Rehab Potential good, to achieve stated therapy goals     Therapy Frequency 1-2 times/wk     Planned Therapy Interventions dysphagia therapy               SLP Assessment/Plan      Most Recent Value   SLP Diagnosis known mild oral and moderate pharyngeal dysphagia,  h/o GERD at 02/24/2022 0842   Patient/Family Therapy Goal Statement to improve breathing at 02/20/2022 0813                    SLP Goals      Most Recent Value   Oral Nutrition/Hydration Goal    Oral Nutrition/Hydration Goal Patient will tolerate least restrictive diet without overt s/sx of aspiration at 02/20/2022 0813   Time Frame by discharge at 02/20/2022 0813   Progress/Outcome goal ongoing at 02/24/2022 0842

## 2022-02-24 NOTE — PLAN OF CARE
Problem: Adult Inpatient Plan of Care  Goal: Plan of Care Review  Outcome: Progressing  Goal: Readiness for Transition of Care  Outcome: Progressing  SW emailed dc instructions and summary to Jaun Polk, Angie. Jam received dates of pt's COVID vaccines. Jam, pt, daughter Leta and medical staff were informed of 1530  today via BLS to [facility] for STR.     MADELINE Lucero,MANDO  Pager#[phone]

## 2022-02-24 NOTE — PLAN OF CARE
Plan of Care Review  Plan of Care Reviewed With: patient  Progress: improving  Outcome Summary: Patient lung sounds rhonchi but improving.  Cough improved.   Breathing improved

## 2022-02-24 NOTE — DISCHARGE SUMMARY
Inpatient Discharge Summary    BRIEF OVERVIEW  Admitting Provider:  H&P Notes 1/25/2022 to 2/24/2022         Date of Service   Author Author Type Status Note Type File Time    02/18/22 2332  Jefe Carlos MD Physician Signed H&P 02/19/22 0646          Attending Provider: Valerie Shah MD Attending phys phone: [phone]  Primary Care Physician at Discharge: Chica Hardy [phone]    Admission Date: 2/18/2022     Discharge Date: 2/24/2022    Primary Discharge Diagnosis  Human metapneumovirus pneumonia    Secondary Discharge Diagnosis  Active Hospital Problems    Diagnosis Date Noted   • Human metapneumovirus pneumonia 02/21/2022   • GERD (gastroesophageal reflux disease) 02/20/2022   • Hyperlipidemia 02/20/2022   • Arthritis 02/20/2022   • Elevated troponin 02/19/2022   • Primary hypertension 02/19/2022   • Other specified hypothyroidism 08/18/2021      Resolved Hospital Problems    Diagnosis Date Noted Date Resolved   • Bronchopneumonia 02/19/2022 02/23/2022   • Hyponatremia 02/19/2022 02/19/2022   • Hypokalemia 02/19/2022 02/21/2022   • Hypoxia 02/18/2022 02/23/2022       DETAILS OF HOSPITAL STAY        Consults:   Consult Notes 1/25/2022 to 2/24/2022         Date of Service   Author Author Type Status Note Type File Time    02/20/22 0942  Abdifatah Becerra MD Physician Signed Consults 02/20/22 0948          Consult Orders During Admission:  IP CONSULT TO INFECTIOUS DISEASE     Results from last 7 days   Lab Units 02/23/22  1426 02/21/22  0552 02/20/22  0601   SODIUM mEQ/L 137 136 135*   POTASSIUM mEQ/L 3.9 4.1 3.3*   CHLORIDE mEQ/L 98 96* 98   CO2 mEQ/L 27 29 28   BUN mg/dL 23* 21* 16   CREATININE mg/dL 0.6 0.6 0.5*   GLUCOSE mg/dL 130* 100* 106*   CALCIUM mg/dL 9.7 8.6* 8.4*     Results from last 7 days   Lab Units 02/20/22  0601 02/19/22  0424 02/18/22  2130   WBC K/uL 3.53* 3.84 3.96   HEMOGLOBIN g/dL 11.2* 11.6* 11.7*   HEMATOCRIT % 32.3* 33.9* 34.5*   PLATELETS K/uL 159 159 171        Imaging  FLUOROSCOPY VIDEO SWALLOW WITH SPEECH    Result Date: 2/21/2022  IMPRESSION: 1. Mild oral dysphagia. 2. Moderate pharyngeal dysphagia with aspiration of thin liquids compensated for using the chin tuck swallow strategy. Dr. Leonardo Ochoa was present for this examination. The undersigned radiologist agrees only with the radiographic findings. Specific swallowing recommendations are solely the purview of the Speech Pathology Division. Maddie De Los Santos MA, CF-SLP in conjunction with Clemencia Levi MA, CCC-SLP/L     CT ANGIOGRAPHY CHEST PULMONARY EMBOLISM WITH IV CONTRAST    Result Date: 2/19/2022  IMPRESSION: 1.  Limited assessment due to significant motion artifact.. No large central pulmonary embolism. If there is continued concern consider repeat study when the patient can better tolerate the examination. 2.  Multifocal airspace disease greatest in the left lower lobe could be related to multifocal pneumonia, aspiration pneumonitis and/or atelectasis. 3.  Peribronchial thickening on the left with some narrowing possible endobronchial fluid in the left bronchi also raises the possibility of aspiration versus mucous. 4.  Mild left hilar and subcarinal lymphadenopathy. This could be reactive to the airspace disease although recommend follow-up chest CT in perhaps in 8-12 weeks to document resolution. 5.  Additional findings discussed below. Findings are concordant with preliminary report provided by Dr. Lino at 12:56 AM 2/19/2022 COMMENT: Comparison: Chest x-ray from 2/18/2022 Technique: CTA of the chest pulmonary embolism protocol was performed with contrast.  3D MIP and/or volume rendered image reconstruction performed and reviewed. 115 mL Omnipaque 350 given. CT DOSE:  One or more dose reduction techniques (e.g. automated exposure control, adjustment of the mA and/or kV according to patient size, use of iterative reconstruction technique) utilized for this examination. FINDINGS: LUNG, PLEURA AND  LARGE AIRWAYS: Motion artifact limits assessment. Trachea and the proximal bronchi on the right remain patent. There is peribronchial thickening and narrowing in the left lower lobe and lingula which is associated with patchy airspace consolidation in the left lower lobe which could be related to pneumonia. It is difficult to further define the bronchial findings due to the degree of motion artifact. However there does appear to be some endobronchial material within the left major bronchi as they course to the hilum possibly representing fluid. Aspiration should be considered. Some patchy airspace opacity versus atelectasis is also noted in the right middle lobe and minimally in the right lower lobe. There is no evidence of a pneumothorax. There may be trace left pleural fluid without significant effusion. VESSELS: Normal caliber main pulmonary artery. There is no main or lobar pulmonary embolism. The remaining pulmonary arteries are limited in their evaluation due to substantial motion artifact markedly degraded quality. Normal caliber of the thoracic aorta with no gross acute findings. At this chronic changes are noted throughout including the coronary arteries. HEART: Heart is enlarged. No pericardial effusion. MEDIASTINUM AND ISABELLE: There is left hilar lymphadenopathy. For example on axial image 121 there is a 1.7 cm lymph node. Follow-up would be recommended document resolution. Mild subcarinal lymphadenopathy measuring 1.4 cm. Minimal hiatal hernia. CHEST WALL AND LOWER NECK: Postsurgical changes left axilla. UPPER ABDOMEN:Atheromatous changes. Limited assessment of the unenhanced motion degraded upper abdominal structures reveals no gross findings of concern. BONES: Demineralization. Spinal curvature. Dextroconvex scoliosis. Degenerative disease. No acute or suspicious findings. Bilateral shoulder reversed arthroplasties.     X-RAY CHEST 1 VIEW    Result Date: 2/19/2022  IMPRESSION: No acute cardiopulmonary  process.           Presenting Problem/History of Present Illness  Hypoxia [R09.02]  Pneumonia due to infectious organism, unspecified laterality, unspecified part of lung [J18.9]      Merced Robin is a 89 y.o. female with PMH of GERD, hypothyroidism, HTN, breast cancer who was admitted on 2/18/2022 with cough and generalized weakness which started one week ago with fever in the setting of recent sick contact with her grandson two weeks ago. She denies any chest pain, abdominal pain, N/V/D, dysuria, or skin rashes but has some dyspnea. On admission, she was afebrile without leukocytosis. CTA chest showed Multifocal airspace disease greatest in the left lower lobe and peribronchial thickening on the left.      Exam on Day of Discharge  Patient seen and examined on day of discharge.  A few scattered rhonchi bilaterally  Heart regular rate and rhythm  Abdomen soft nontender  Legs no edema    Hospital Course  Patient was admitted to medicine service. Blood cx were sent and were negative. Sputum cultures were negative. She was started on ceftriaxone and azithromycin initially pending culture results. Mycoplasma and chlamydia, legionella, RSV and SARS COV 2 were negative. We involved our ID specialist and a viral panel was requested that was positive for human metapneumovirus. Antibiotics were stopped. We continued supportive care. Her oxygenation improved. We were able to wean her off oxygen.      While here we noted that she was hypokalemic requiring supplementation while Bps were WNL without diuretic. We continued her without thiazide diuretic with plan to follow-up with PCP in 1 week to determine if safe to resume chlorthalidone at that time.     Discharge Orders     Medication List      START taking these medications    guaiFENesin 600 mg 12 hr tablet  Commonly known as: MUCINEX  Take 2 tablets (1,200 mg total) by mouth 2 (two) times a day for 10 days.  Dose: 1,200 mg     ipratropium-albuteroL 0.5-2.5 mg/3 mL nebulizer  solution  Commonly known as: DUO-NEB  Take 3 mL by nebulization every 6 (six) hours.  Dose: 3 mL        CONTINUE taking these medications    amLODIPine 5 mg tablet  Commonly known as: NORVASC  Take 5 mg by mouth daily.  Dose: 5 mg     calcium citrate-vitamin D3 315 mg-5 mcg (200 unit) per tablet  Commonly known as: CITRACAL+D  Take 1 tablet by mouth once daily.  Dose: 1 tablet     cetirizine 10 mg tablet  Commonly known as: ZyrTEC  Take 10 mg by mouth daily.  Dose: 10 mg     cyanocobalamin 100 mcg tablet  Commonly known as: VITAMIN B12  Take 100 mcg by mouth daily.  Dose: 100 mcg     DAILY MULTIVITAMIN-MINERALS tablet  Take 1 tablet by mouth daily.  Dose: 1 tablet  Generic drug: multivitamin with minerals     lansoprazole 30 mg disintegrating tablet  Commonly known as: PREVACID SOLUTAB  Take 30 mg by mouth daily before breakfast.  Dose: 30 mg     levothyroxine 75 mcg tablet  Commonly known as: SYNTHROID  Take 75 mcg by mouth daily.  Dose: 75 mcg     losartan 50 mg tablet  Commonly known as: COZAAR  Take 50 mg by mouth daily.  Dose: 50 mg     PARoxetine 20 mg tablet  Commonly known as: PAXIL  Take 20 mg by mouth daily.  Dose: 20 mg     rosuvastatin 10 mg tablet  Commonly known as: CRESTOR  Take 10 mg by mouth daily.  Dose: 10 mg        STOP taking these medications    chlorthalidone 25 mg tablet  Commonly known as: HYGROTEN     omeprazole OTC 20 mg EC tablet  Commonly known as: PriLOSEC OTC              Outpatient Follow-Ups  Encounter Information    This patient does not currently have any appointments scheduled.       Referrals:  No orders of the defined types were placed in this encounter.      Active Issues Requiring Follow-up  Follow-up with primary care physician in 1 week for blood pressure monitoring.  BMP next week.  Patient would need follow-up BMP after starting chlorthalidone      Test Results Pending at Discharge  Unresulted Labs (From admission, onward)             Start     Ordered    02/18/22 4040   Sputum culture / smear Expectorated Sputum  Once        Question:  Release to patient  Answer:  Immediate    02/18/22 2338 02/18/22 2339  Sputum Gram Stain (Lab Only) Expectorated Sputum  PROCEDURE ONCE        Question:  Release to patient  Answer:  Immediate    02/18/22 2338              Called and left message for Dionicio Carballo in details with callback number      Discharge Disposition  Disposition: Skilled Nursing Facility - Other   Destination: Skilled Nursing Facility      Code Status at Discharge: DNR (A.N.D.)  Physician Order for Life-Sustaining Treatment Document Status      No documents found

## 2022-03-02 PROCEDURE — 99309 SBSQ NF CARE MODERATE MDM 30: CPT | Performed by: INTERNAL MEDICINE

## 2022-12-02 ENCOUNTER — APPOINTMENT (EMERGENCY)
Dept: RADIOLOGY | Facility: HOSPITAL | Age: 87
DRG: 202 | End: 2022-12-02
Payer: MEDICARE

## 2022-12-02 ENCOUNTER — HOSPITAL ENCOUNTER (INPATIENT)
Facility: HOSPITAL | Age: 87
LOS: 5 days | Discharge: NURSING, REHAB OR LONG-TERM CARE | DRG: 202 | End: 2022-12-07
Attending: EMERGENCY MEDICINE | Admitting: INTERNAL MEDICINE
Payer: MEDICARE

## 2022-12-02 DIAGNOSIS — E87.6 HYPOKALEMIA: ICD-10-CM

## 2022-12-02 DIAGNOSIS — J18.9 PNEUMONIA DUE TO INFECTIOUS ORGANISM, UNSPECIFIED LATERALITY, UNSPECIFIED PART OF LUNG: Primary | ICD-10-CM

## 2022-12-02 DIAGNOSIS — E83.42 HYPOMAGNESEMIA: ICD-10-CM

## 2022-12-02 PROBLEM — E03.8 OTHER SPECIFIED HYPOTHYROIDISM: Chronic | Status: ACTIVE | Noted: 2021-08-18

## 2022-12-02 PROBLEM — I10 PRIMARY HYPERTENSION: Chronic | Status: ACTIVE | Noted: 2022-02-19

## 2022-12-02 PROBLEM — K21.9 GERD (GASTROESOPHAGEAL REFLUX DISEASE): Chronic | Status: ACTIVE | Noted: 2022-02-20

## 2022-12-02 PROBLEM — E78.5 HYPERLIPIDEMIA: Chronic | Status: ACTIVE | Noted: 2022-02-20

## 2022-12-02 LAB
ALBUMIN SERPL-MCNC: 3.8 G/DL (ref 3.4–5)
ALP SERPL-CCNC: 88 IU/L (ref 35–126)
ALT SERPL-CCNC: 24 IU/L (ref 11–54)
ANION GAP SERPL CALC-SCNC: 12 MEQ/L (ref 3–15)
AST SERPL-CCNC: 32 IU/L (ref 15–41)
BASOPHILS # BLD: 0 K/UL (ref 0.01–0.1)
BASOPHILS NFR BLD: 0 %
BILIRUB SERPL-MCNC: 1.8 MG/DL (ref 0.3–1.2)
BUN SERPL-MCNC: 15 MG/DL (ref 8–20)
CALCIUM SERPL-MCNC: 9.1 MG/DL (ref 8.9–10.3)
CHLORIDE SERPL-SCNC: 93 MEQ/L (ref 98–109)
CO2 SERPL-SCNC: 27 MEQ/L (ref 22–32)
CREAT SERPL-MCNC: 0.7 MG/DL (ref 0.6–1.1)
DIFFERENTIAL METHOD BLD: ABNORMAL
EOSINOPHIL # BLD: 0 K/UL (ref 0.04–0.36)
EOSINOPHIL NFR BLD: 0 %
ERYTHROCYTE [DISTWIDTH] IN BLOOD BY AUTOMATED COUNT: 12.5 % (ref 11.7–14.4)
FLUAV RNA SPEC QL NAA+PROBE: NEGATIVE
FLUBV RNA SPEC QL NAA+PROBE: NEGATIVE
GFR SERPL CREATININE-BSD FRML MDRD: >60 ML/MIN/1.73M*2
GLUCOSE SERPL-MCNC: 135 MG/DL (ref 70–99)
HCT VFR BLDCO AUTO: 31.8 % (ref 35–45)
HGB BLD-MCNC: 10.9 G/DL (ref 11.8–15.7)
LACTATE SERPL-SCNC: 1.3 MMOL/L (ref 0.4–2)
LYMPHOCYTES # BLD: 1.69 K/UL (ref 1.2–3.5)
LYMPHOCYTES NFR BLD: 18 %
MAGNESIUM SERPL-MCNC: 1.2 MG/DL (ref 1.8–2.5)
MCH RBC QN AUTO: 30.1 PG (ref 28–33.2)
MCHC RBC AUTO-ENTMCNC: 34.3 G/DL (ref 32.2–35.5)
MCV RBC AUTO: 87.8 FL (ref 83–98)
METAMYELOCYTES # BLD MANUAL: 0.09 K/UL
METAMYELOCYTES NFR BLD MANUAL: 1 %
MONOCYTES # BLD: 0.47 K/UL (ref 0.28–0.8)
MONOCYTES NFR BLD: 5 %
NEUTS BAND # BLD: 0.37 K/UL (ref 0–0.53)
NEUTS BAND # BLD: 6.65 K/UL (ref 1.7–7)
NEUTS BAND NFR BLD: 4 %
NEUTS SEG NFR BLD: 71 %
PDW BLD AUTO: 9.7 FL (ref 9.4–12.3)
PLAT MORPH BLD: NORMAL
PLATELET # BLD AUTO: 237 K/UL (ref 150–369)
PLATELET # BLD EST: ABNORMAL 10*3/UL
POTASSIUM SERPL-SCNC: 3.1 MEQ/L (ref 3.6–5.1)
PROT SERPL-MCNC: 7.3 G/DL (ref 6–8.2)
RBC # BLD AUTO: 3.62 M/UL (ref 3.93–5.22)
RBC MORPH BLD: NORMAL
RSV RNA SPEC QL NAA+PROBE: NEGATIVE
SARS-COV-2 RNA RESP QL NAA+PROBE: NEGATIVE
SODIUM SERPL-SCNC: 132 MEQ/L (ref 136–144)
TROPONIN I SERPL HS-MCNC: 11.4 PG/ML
TROPONIN I SERPL HS-MCNC: 11.6 PG/ML
VARIANT LYMPHS # BLD MANUAL: 0.09 K/UL
VARIANT LYMPHS NFR BLD: 1 %
WBC # BLD AUTO: 9.37 K/UL (ref 3.8–10.5)

## 2022-12-02 PROCEDURE — 87637 SARSCOV2&INF A&B&RSV AMP PRB: CPT | Performed by: PHYSICIAN ASSISTANT

## 2022-12-02 PROCEDURE — 1123F ACP DISCUSS/DSCN MKR DOCD: CPT | Performed by: INTERNAL MEDICINE

## 2022-12-02 PROCEDURE — 94640 AIRWAY INHALATION TREATMENT: CPT

## 2022-12-02 PROCEDURE — 80053 COMPREHEN METABOLIC PANEL: CPT | Performed by: PHYSICIAN ASSISTANT

## 2022-12-02 PROCEDURE — 94644 CONT INHLJ TX 1ST HOUR: CPT

## 2022-12-02 PROCEDURE — 25800000 HC PHARMACY IV SOLUTIONS: Performed by: INTERNAL MEDICINE

## 2022-12-02 PROCEDURE — 85025 COMPLETE CBC W/AUTO DIFF WBC: CPT | Performed by: PHYSICIAN ASSISTANT

## 2022-12-02 PROCEDURE — 87040 BLOOD CULTURE FOR BACTERIA: CPT | Performed by: PHYSICIAN ASSISTANT

## 2022-12-02 PROCEDURE — 83605 ASSAY OF LACTIC ACID: CPT | Performed by: PHYSICIAN ASSISTANT

## 2022-12-02 PROCEDURE — 71045 X-RAY EXAM CHEST 1 VIEW: CPT

## 2022-12-02 PROCEDURE — 25800000 HC PHARMACY IV SOLUTIONS: Performed by: PHYSICIAN ASSISTANT

## 2022-12-02 PROCEDURE — 63700000 HC SELF-ADMINISTRABLE DRUG: Performed by: PHYSICIAN ASSISTANT

## 2022-12-02 PROCEDURE — 63600000 HC DRUGS/DETAIL CODE: Performed by: PHYSICIAN ASSISTANT

## 2022-12-02 PROCEDURE — 99223 1ST HOSP IP/OBS HIGH 75: CPT | Performed by: INTERNAL MEDICINE

## 2022-12-02 PROCEDURE — 84484 ASSAY OF TROPONIN QUANT: CPT | Performed by: PHYSICIAN ASSISTANT

## 2022-12-02 PROCEDURE — 25000000 HC PHARMACY GENERAL: Performed by: PHYSICIAN ASSISTANT

## 2022-12-02 PROCEDURE — 12000000 HC ROOM AND CARE MED/SURG

## 2022-12-02 PROCEDURE — 83735 ASSAY OF MAGNESIUM: CPT | Performed by: PHYSICIAN ASSISTANT

## 2022-12-02 PROCEDURE — 36415 COLL VENOUS BLD VENIPUNCTURE: CPT | Performed by: PHYSICIAN ASSISTANT

## 2022-12-02 PROCEDURE — 93005 ELECTROCARDIOGRAM TRACING: CPT | Performed by: PHYSICIAN ASSISTANT

## 2022-12-02 PROCEDURE — 99285 EMERGENCY DEPT VISIT HI MDM: CPT | Mod: 25

## 2022-12-02 RX ORDER — ALUMINUM HYDROXIDE, MAGNESIUM HYDROXIDE, AND SIMETHICONE 1200; 120; 1200 MG/30ML; MG/30ML; MG/30ML
30 SUSPENSION ORAL EVERY 4 HOURS PRN
Status: DISCONTINUED | OUTPATIENT
Start: 2022-12-02 | End: 2022-12-07 | Stop reason: HOSPADM

## 2022-12-02 RX ORDER — CETIRIZINE HYDROCHLORIDE 10 MG/1
10 TABLET ORAL DAILY
Status: DISCONTINUED | OUTPATIENT
Start: 2022-12-03 | End: 2022-12-07 | Stop reason: HOSPADM

## 2022-12-02 RX ORDER — NITROGLYCERIN 0.4 MG/1
0.4 TABLET SUBLINGUAL EVERY 5 MIN PRN
Status: DISCONTINUED | OUTPATIENT
Start: 2022-12-02 | End: 2022-12-07 | Stop reason: HOSPADM

## 2022-12-02 RX ORDER — ALBUTEROL SULFATE 0.83 MG/ML
2.5 SOLUTION RESPIRATORY (INHALATION) EVERY 4 HOURS PRN
Status: DISCONTINUED | OUTPATIENT
Start: 2022-12-02 | End: 2022-12-07 | Stop reason: HOSPADM

## 2022-12-02 RX ORDER — ROSUVASTATIN CALCIUM 10 MG/1
10 TABLET, COATED ORAL DAILY
Status: DISCONTINUED | OUTPATIENT
Start: 2022-12-03 | End: 2022-12-07 | Stop reason: HOSPADM

## 2022-12-02 RX ORDER — PAROXETINE HYDROCHLORIDE 20 MG/1
20 TABLET, FILM COATED ORAL DAILY
Status: DISCONTINUED | OUTPATIENT
Start: 2022-12-03 | End: 2022-12-07 | Stop reason: HOSPADM

## 2022-12-02 RX ORDER — IPRATROPIUM BROMIDE AND ALBUTEROL SULFATE 2.5; .5 MG/3ML; MG/3ML
3 SOLUTION RESPIRATORY (INHALATION) ONCE
Status: COMPLETED | OUTPATIENT
Start: 2022-12-02 | End: 2022-12-02

## 2022-12-02 RX ORDER — ACETAMINOPHEN 325 MG/1
650 TABLET ORAL EVERY 4 HOURS PRN
Status: DISCONTINUED | OUTPATIENT
Start: 2022-12-02 | End: 2022-12-07 | Stop reason: HOSPADM

## 2022-12-02 RX ORDER — LOSARTAN POTASSIUM 50 MG/1
50 TABLET ORAL DAILY
Status: DISCONTINUED | OUTPATIENT
Start: 2022-12-03 | End: 2022-12-07 | Stop reason: HOSPADM

## 2022-12-02 RX ORDER — ACETAMINOPHEN 325 MG/1
650 TABLET ORAL ONCE
Status: COMPLETED | OUTPATIENT
Start: 2022-12-02 | End: 2022-12-02

## 2022-12-02 RX ORDER — ENOXAPARIN SODIUM 100 MG/ML
30 INJECTION SUBCUTANEOUS
Status: DISCONTINUED | OUTPATIENT
Start: 2022-12-03 | End: 2022-12-07 | Stop reason: HOSPADM

## 2022-12-02 RX ORDER — CHLORTHALIDONE 50 MG/1
25 TABLET ORAL DAILY
COMMUNITY

## 2022-12-02 RX ORDER — AMLODIPINE BESYLATE 5 MG/1
5 TABLET ORAL DAILY
Status: DISCONTINUED | OUTPATIENT
Start: 2022-12-03 | End: 2022-12-07 | Stop reason: HOSPADM

## 2022-12-02 RX ORDER — IPRATROPIUM BROMIDE 0.5 MG/2.5ML
1 SOLUTION RESPIRATORY (INHALATION) ONCE
Status: COMPLETED | OUTPATIENT
Start: 2022-12-02 | End: 2022-12-02

## 2022-12-02 RX ORDER — PANTOPRAZOLE SODIUM 40 MG/1
40 TABLET, DELAYED RELEASE ORAL DAILY
Status: DISCONTINUED | OUTPATIENT
Start: 2022-12-03 | End: 2022-12-07 | Stop reason: HOSPADM

## 2022-12-02 RX ORDER — SODIUM CHLORIDE 9 MG/ML
INJECTION, SOLUTION INTRAVENOUS CONTINUOUS
Status: DISCONTINUED | OUTPATIENT
Start: 2022-12-02 | End: 2022-12-03

## 2022-12-02 RX ORDER — BISACODYL 10 MG/1
10 SUPPOSITORY RECTAL DAILY PRN
Status: DISCONTINUED | OUTPATIENT
Start: 2022-12-02 | End: 2022-12-07 | Stop reason: HOSPADM

## 2022-12-02 RX ORDER — ONDANSETRON HYDROCHLORIDE 2 MG/ML
4 INJECTION, SOLUTION INTRAVENOUS EVERY 8 HOURS PRN
Status: DISCONTINUED | OUTPATIENT
Start: 2022-12-02 | End: 2022-12-07 | Stop reason: HOSPADM

## 2022-12-02 RX ORDER — POTASSIUM CHLORIDE 750 MG/1
40 TABLET, EXTENDED RELEASE ORAL 2 TIMES DAILY
Status: DISCONTINUED | OUTPATIENT
Start: 2022-12-02 | End: 2022-12-03

## 2022-12-02 RX ORDER — IBUPROFEN 200 MG
16-32 TABLET ORAL AS NEEDED
Status: DISCONTINUED | OUTPATIENT
Start: 2022-12-02 | End: 2022-12-07 | Stop reason: HOSPADM

## 2022-12-02 RX ORDER — ALBUTEROL SULFATE 2.5 MG/.5ML
10 SOLUTION RESPIRATORY (INHALATION) ONCE
Status: COMPLETED | OUTPATIENT
Start: 2022-12-02 | End: 2022-12-02

## 2022-12-02 RX ORDER — PNV NO.95/FERROUS FUM/FOLIC AC 28MG-0.8MG
100 TABLET ORAL DAILY
Status: DISCONTINUED | OUTPATIENT
Start: 2022-12-03 | End: 2022-12-07 | Stop reason: HOSPADM

## 2022-12-02 RX ORDER — ATROPINE SULFATE 0.1 MG/ML
0.5 INJECTION INTRAVENOUS EVERY 5 MIN PRN
Status: DISCONTINUED | OUTPATIENT
Start: 2022-12-02 | End: 2022-12-07 | Stop reason: HOSPADM

## 2022-12-02 RX ORDER — LEVOTHYROXINE SODIUM 75 UG/1
75 TABLET ORAL
Status: DISCONTINUED | OUTPATIENT
Start: 2022-12-03 | End: 2022-12-07 | Stop reason: HOSPADM

## 2022-12-02 RX ORDER — SENNOSIDES 8.6 MG/1
1 TABLET ORAL 2 TIMES DAILY PRN
Status: DISCONTINUED | OUTPATIENT
Start: 2022-12-02 | End: 2022-12-07 | Stop reason: HOSPADM

## 2022-12-02 RX ORDER — GUAIFENESIN 100 MG/5ML
100 SOLUTION ORAL EVERY 4 HOURS PRN
Status: DISCONTINUED | OUTPATIENT
Start: 2022-12-02 | End: 2022-12-07 | Stop reason: HOSPADM

## 2022-12-02 RX ORDER — DEXTROSE 40 %
15-30 GEL (GRAM) ORAL AS NEEDED
Status: DISCONTINUED | OUTPATIENT
Start: 2022-12-02 | End: 2022-12-07 | Stop reason: HOSPADM

## 2022-12-02 RX ORDER — DEXTROSE 50 % IN WATER (D50W) INTRAVENOUS SYRINGE
25 AS NEEDED
Status: DISCONTINUED | OUTPATIENT
Start: 2022-12-02 | End: 2022-12-07 | Stop reason: HOSPADM

## 2022-12-02 RX ADMIN — CEFTRIAXONE SODIUM 1 G: 1 INJECTION, POWDER, FOR SOLUTION INTRAMUSCULAR; INTRAVENOUS at 19:41

## 2022-12-02 RX ADMIN — IPRATROPIUM BROMIDE AND ALBUTEROL SULFATE 3 ML: .5; 3 SOLUTION RESPIRATORY (INHALATION) at 18:20

## 2022-12-02 RX ADMIN — AZITHROMYCIN MONOHYDRATE 500 MG: 500 INJECTION, POWDER, LYOPHILIZED, FOR SOLUTION INTRAVENOUS at 19:45

## 2022-12-02 RX ADMIN — IPRATROPIUM BROMIDE 1 MG: 0.5 SOLUTION RESPIRATORY (INHALATION) at 19:54

## 2022-12-02 RX ADMIN — ALBUTEROL SULFATE 10 MG: 2.5 SOLUTION RESPIRATORY (INHALATION) at 19:54

## 2022-12-02 RX ADMIN — SODIUM CHLORIDE: 9 INJECTION, SOLUTION INTRAVENOUS at 23:38

## 2022-12-02 RX ADMIN — POTASSIUM CHLORIDE 40 MEQ: 750 TABLET, EXTENDED RELEASE ORAL at 19:48

## 2022-12-02 RX ADMIN — MAGNESIUM SULFATE HEPTAHYDRATE 2 G: 40 INJECTION, SOLUTION INTRAVENOUS at 19:43

## 2022-12-02 RX ADMIN — ACETAMINOPHEN 650 MG: 325 TABLET ORAL at 18:43

## 2022-12-02 ASSESSMENT — ENCOUNTER SYMPTOMS
DIARRHEA: 0
NUMBNESS: 0
VOMITING: 0
COUGH: 1
NAUSEA: 0
ABDOMINAL PAIN: 0
CHILLS: 0
CHEST TIGHTNESS: 0
FEVER: 1
LIGHT-HEADEDNESS: 0
SHORTNESS OF BREATH: 1
WEAKNESS: 0

## 2022-12-02 NOTE — ED PROVIDER NOTES
Emergency Medicine Note  HPI   HISTORY OF PRESENT ILLNESS     89yF PMH breast CA, HTN, hypothyroid presents via EMS from home with cough, congestion, and hypoxia. Per patient, her grandson was recently sick and she believes she may have contracted his illness. For the last 3 days she has had cough, congestion, headache. Low grade subjective fevers. Now feeling slightly short of breath. No CP. No abd pain, n/v/d. No LE swelling. Called EMS today and was found to be 85% on RA. Placed on 2L NC. Noted to be wheezing, given duoneb en route and feels breathing is slightly improved.     She is vaccinated for COVID, flu, and pneumonia this year.     Patient was a smoker for 3 years in High School.             Patient History   PAST HISTORY     Reviewed from Nursing Triage:  Tobacco  Allergies  Meds  Problems  Med Hx  Surg Hx  Fam Hx  Soc   Hx      Past Medical History:   Diagnosis Date   • Breast cancer (CMS/HCC)    • GERD (gastroesophageal reflux disease) 2/20/2022   • Human metapneumovirus pneumonia 2/21/2022   • Hyperlipidemia 2/20/2022   • Hypertension    • Hypothyroidism        Past Surgical History:   Procedure Laterality Date   • BREAST LUMPECTOMY Bilateral    • HIP SURGERY Right    • REPLACEMENT TOTAL KNEE Right    • SHOULDER SURGERY Bilateral        History reviewed. No pertinent family history.    Social History     Tobacco Use   • Smoking status: Former   • Smokeless tobacco: Never   • Tobacco comments:     smoked ages [age]   Vaping Use   • Vaping Use: Never used   Substance Use Topics   • Alcohol use: Yes     Comment: rare   • Drug use: Not Currently         Review of Systems   REVIEW OF SYSTEMS     Review of Systems   Constitutional: Positive for fever. Negative for chills.   Respiratory: Positive for cough and shortness of breath. Negative for chest tightness.    Cardiovascular: Negative for chest pain and leg swelling.   Gastrointestinal: Negative for abdominal pain, diarrhea, nausea and vomiting.    Neurological: Negative for weakness, light-headedness and numbness.         VITALS     ED Vitals    Date/Time Temp Pulse Resp BP SpO2 [facility]   12/02/22 2041 -- 84 29 109/55 98 % [facility]   12/02/22 2015 37.2 °C (99 °F) 90 30 -- 97 % [facility]   12/02/22 1941 -- 96 35 134/65 93 % [facility]   12/02/22 1830 -- 100 28 146/67 96 % [facility]   12/02/22 1804 39 °C (102.2 °F) 103 36 162/74 91 % [facility]        Pulse Ox %: 91 % (12/02/22 1921)  Pulse Ox Interpretation: Normal (12/02/22 1921)  Heart Rate: 103 (12/02/22 1921)  Rhythm Strip Interpretation: Sinus Tachycardia (12/02/22 1921)     Physical Exam   PHYSICAL EXAM     Physical Exam  Vitals and nursing note reviewed.   Constitutional:       Appearance: She is not ill-appearing, toxic-appearing or diaphoretic.   HENT:      Head: Normocephalic and atraumatic.   Cardiovascular:      Rate and Rhythm: Normal rate and regular rhythm.      Pulses: Normal pulses.   Pulmonary:      Effort: Respiratory distress (mild tachypnea) present.      Breath sounds: Wheezing and rhonchi present. No rales.   Abdominal:      General: Abdomen is flat.      Tenderness: There is no abdominal tenderness. There is no guarding or rebound.   Musculoskeletal:         General: No swelling or tenderness.      Right lower leg: No edema.      Left lower leg: No edema.   Skin:     General: Skin is warm.      Capillary Refill: Capillary refill takes less than 2 seconds.   Neurological:      Mental Status: She is alert.           PROCEDURES     Procedures     DATA     Results     Procedure Component Value Units Date/Time    Magnesium [[id number]]  (Abnormal) Collected: 12/02/22 1811    Specimen: Blood, Venous Updated: 12/02/22 1919     Magnesium 1.2 mg/dL     SARS-CoV-2 (COVID-19), PCR Nasopharynx [[id number]]  (Normal) Collected: 12/02/22 1807    Specimen: Nasopharyngeal Swab from Nasopharynx Updated: 12/02/22 1905    Narrative:      The following orders were created for panel order SARS-CoV-2 (COVID-19), PCR Nasopharynx.  Procedure                                Abnormality         Status                     ---------                               -----------         ------                     SARS-COV-2 (COVID-19)/ F...[[id number]]  Normal              Final result                 Please view results for these tests on the individual orders.    SARS-COV-2 (COVID-19)/ FLU A/B, AND RSV, PCR Nasopharynx [[id number]]  (Normal) Collected: 12/02/22 1807    Specimen: Nasopharyngeal Swab from Nasopharynx Updated: 12/02/22 1905     SARS-CoV-2 (COVID-19) Negative     Influenza A Negative     Influenza B Negative     Respiratory Syncytial Virus Negative    Narrative:      Testing performed using real-time PCR for detection of COVID-19. EUA approved validation studies performed on site.     HS Troponin I (with 2 hour reflex) [[id number]]  (Normal) Collected: 12/02/22 1808    Specimen: Blood, Venous Updated: 12/02/22 1856     High Sens Troponin I 11.4 pg/mL     CBC and differential [[id number]]  (Abnormal) Collected: 12/02/22 1808    Specimen: Blood, Venous Updated: 12/02/22 1850     WBC 9.37 K/uL      RBC 3.62 M/uL      Hemoglobin 10.9 g/dL      Hematocrit 31.8 %      MCV 87.8 fL      MCH 30.1 pg      MCHC 34.3 g/dL      RDW 12.5 %      Platelets 237 K/uL      MPV 9.7 fL      Differential Type Manu     Neutrophils 71 %      Lymphocytes 18 %      Monocytes 5 %      Eosinophils 0 %      Basophils 0 %      Bands 4 %      Metamyelocytes 1 %      Atypical Lymphocytes 1 %      Neutrophils, Absolute 6.65 K/uL      Lymphocytes, Absolute 1.69 K/uL      Monocytes, Absolute 0.47 K/uL      Eosinophils, Absolute 0.00 K/uL      Basophils, Absolute 0.00 K/uL      Bands, Absolute 0.37 K/uL      Metamyelocytes, Absolute 0.09 K/uL      Atypical Lymphs, Absolute 0.09 K/uL      RBC Morphology Normal     PLT Morphology Normal     Platelet Estimate Adequate (150,000-400,000)    Comprehensive metabolic panel [[id number]]  (Abnormal) Collected: 12/02/22 1808    Specimen: Blood, Venous  Updated: 12/02/22 1847     Sodium 132 mEQ/L      Potassium 3.1 mEQ/L      Comment: Results obtained on plasma. Plasma Potassium values may be up to 0.4 mEQ/L less than serum values. The differences may be greater for patients with high platelet or white cell counts.        Chloride 93 mEQ/L      CO2 27 mEQ/L      BUN 15 mg/dL      Creatinine 0.7 mg/dL      Glucose 135 mg/dL      Calcium 9.1 mg/dL      AST (SGOT) 32 IU/L      ALT (SGPT) 24 IU/L      Alkaline Phosphatase 88 IU/L      Total Protein 7.3 g/dL      Comment: Test performed on plasma which typically contains approximately 0.4 g/dL more protein than serum.        Albumin 3.8 g/dL      Bilirubin, Total 1.8 mg/dL      eGFR >60.0 mL/min/1.73m*2      Anion Gap 12 mEQ/L     Lactate, w/ reflex repeat if > 2.0 [[id number]]  (Normal) Collected: 12/02/22 1808    Specimen: Blood, Venous Updated: 12/02/22 1827     Lactate 1.3 mmol/L     Blood Culture Blood, Venous [[id number]] Collected: 12/02/22 1808    Specimen: Blood, Venous Updated: 12/02/22 1824    Blood Culture Blood, Venous [[id number]] Collected: 12/02/22 1808    Specimen: Blood, Venous Updated: 12/02/22 1824    RAINBOW LT BLUE [[id number]] Collected: 12/02/22 1809    Specimen: Blood, Venous Updated: 12/02/22 1822    RAINBOW LAVENDER [[id number]] Collected: 12/02/22 1809    Specimen: Blood, Venous Updated: 12/02/22 1822    RAINBOW RED [[id number]] Collected: 12/02/22 1811    Specimen: Blood, Venous Updated: 12/02/22 1822    Anton Draw Panel [[id number]] Collected: 12/02/22 1811    Specimen: Blood, Venous Updated: 12/02/22 1822    Narrative:      The following orders were created for panel order Anton Draw Panel.  Procedure                               Abnormality         Status                     ---------                               -----------         ------                     RAINBOW RED[[id number]]                                      In process                 RAINBOW GOLD[[id number]]                                      In process                   Please view results for these tests on the individual orders.    RAINBOW GOLD [[id number]] Collected: 12/02/22 1811    Specimen: Blood, Venous Updated: 12/02/22 1822    Hiram Draw Panel [[id number]] Collected: 12/02/22 1809    Specimen: Blood, Venous Updated: 12/02/22 1822    Narrative:      The following orders were created for panel order Hiram Draw Panel.  Procedure                               Abnormality         Status                     ---------                               -----------         ------                     RAINBOW PINK[[id number]]                                     In process                 HIRAM LAVENDER[[id number]]                                 In process                 HIRAM LT BLUE[[id number]]                                  In process                   Please view results for these tests on the individual orders.    RAINBOW PINK [[id number]] Collected: 12/02/22 1809    Specimen: Blood, Venous Updated: 12/02/22 1822          Imaging Results          X-RAY CHEST 1 VIEW (Final result)  Result time 12/02/22 19:31:56    Final result                 Impression:    IMPRESSION:  No acute cardiopulmonary disease.             Narrative:    CLINICAL HISTORY: Shortness of breath, hypoxia, rhonchi.    COMMENT:    Comparison: CTA chest 2/18/2022    Technique: Single portable AP view of the thorax was performed.    Findings:  Tubes and Lines: Overlying cardiac leads.    Lungs: No pneumothorax, focal consolidation, pleural effusion or pulmonary  edema.    Cardiomediastinal silhouette: Cardiomegaly. Uncoiled thoracic aorta.    Partially imaged bilateral shoulder arthroplasties.                                ECG 12 lead   ED Interpretation   EKG 1849  103 bpm sinus tach left axis jasmina 204 no stemi           Scoring tools                                  ED Course & MDM   MDM / ED COURSE / CLINICAL IMPRESSION / DISPO     MDM    ED Course as of 12/02/22  2222   Fri Dec 02, 2022   1807 Call to patient's daughter Leta per her request to notify of arrival. No answer. Phone continued to ring without option to LVM.  [SK]   1808 Called alternative number for patient's daughter Leta. She is in the [place]. Informed her she can come back to ER room.  [SK]   1831 Daughter updated at bedside [SK]   1842 Lactate: 1.3 [SK]   1916 Patient and daughter updated. Patient has not been hospitalized in the last 90 days. Not a NH patient. [place] [facility] [place]     Patient and daughter confirm DNR/ DNI    Pulse ox 90% on 2L NC. Increased to 3.5L NC.  [SK]      ED Course User Index  [SK] Priyanka Tilley PA C     Clinical Impression      Pneumonia due to infectious organism, unspecified laterality, unspecified part of lung   Hypokalemia   Hypomagnesemia     _________________     ED Disposition   Admit / Observation                   Priyanka Tilley PA C  12/02/22 2221

## 2022-12-02 NOTE — ED ATTESTATION NOTE
Physician Attestation:     I personally saw and evaluated the patient, participated in the management, and agree with the findings in the above note except as where stated.  The Physician Assistant and I discussed  the case, workup, and disposition.      Chief Complaint  Chief Complaint   Patient presents with   • Shortness of Breath   • Cough         89 y.o. f  Presents for eval  Cough, congestison  Hypoxic   Sputum production   Low grade fevers  + sob  Ems found pt at 85 %  Duo neb en route with slight improvement  Fully vaccinated    Non toxic  Vitals reviewed  Febrile hypoxic tachycardic tachypneac   Tachy reg  Lungs with diffuse rhonci  No cyanosis  Normal speech and mentation      Eldon Casiano, DO  12/02/22 1959

## 2022-12-03 PROBLEM — B96.89 ACUTE BACTERIAL BRONCHITIS: Status: ACTIVE | Noted: 2022-12-02

## 2022-12-03 PROBLEM — J20.8 ACUTE BACTERIAL BRONCHITIS: Status: ACTIVE | Noted: 2022-12-02

## 2022-12-03 LAB
ANION GAP SERPL CALC-SCNC: 10 MEQ/L (ref 3–15)
ATRIAL RATE: 103
BILIRUB UR QL STRIP.AUTO: NEGATIVE MG/DL
BUN SERPL-MCNC: 13 MG/DL (ref 8–20)
CALCIUM SERPL-MCNC: 8.4 MG/DL (ref 8.9–10.3)
CHLORIDE SERPL-SCNC: 97 MEQ/L (ref 98–109)
CLARITY UR REFRACT.AUTO: CLEAR
CO2 SERPL-SCNC: 26 MEQ/L (ref 22–32)
COLOR UR AUTO: YELLOW
CREAT SERPL-MCNC: 0.5 MG/DL (ref 0.6–1.1)
GFR SERPL CREATININE-BSD FRML MDRD: >60 ML/MIN/1.73M*2
GLUCOSE SERPL-MCNC: 134 MG/DL (ref 70–99)
GLUCOSE UR STRIP.AUTO-MCNC: NEGATIVE MG/DL
HGB UR QL STRIP.AUTO: NEGATIVE
KETONES UR STRIP.AUTO-MCNC: NEGATIVE MG/DL
LEUKOCYTE ESTERASE UR QL STRIP.AUTO: NEGATIVE
MAGNESIUM SERPL-MCNC: 2 MG/DL (ref 1.8–2.5)
NITRITE UR QL STRIP.AUTO: NEGATIVE
P AXIS: 37
PH UR STRIP.AUTO: 6 [PH]
POTASSIUM SERPL-SCNC: 3.5 MEQ/L (ref 3.6–5.1)
PR INTERVAL: 204
PROT UR QL STRIP.AUTO: NEGATIVE
QRS DURATION: 94
QT INTERVAL: 342
QTC CALCULATION(BAZETT): 448
R AXIS: -26
SODIUM SERPL-SCNC: 133 MEQ/L (ref 136–144)
SP GR UR REFRACT.AUTO: 1.01
T WAVE AXIS: 41
UROBILINOGEN UR STRIP-ACNC: 0.2 EU/DL
VENTRICULAR RATE: 103

## 2022-12-03 PROCEDURE — 25800000 HC PHARMACY IV SOLUTIONS: Performed by: INTERNAL MEDICINE

## 2022-12-03 PROCEDURE — 81003 URINALYSIS AUTO W/O SCOPE: CPT | Performed by: STUDENT IN AN ORGANIZED HEALTH CARE EDUCATION/TRAINING PROGRAM

## 2022-12-03 PROCEDURE — 63700000 HC SELF-ADMINISTRABLE DRUG: Performed by: INTERNAL MEDICINE

## 2022-12-03 PROCEDURE — 63700000 HC SELF-ADMINISTRABLE DRUG: Performed by: STUDENT IN AN ORGANIZED HEALTH CARE EDUCATION/TRAINING PROGRAM

## 2022-12-03 PROCEDURE — 20600000 HC ROOM AND CARE INTERMEDIATE/TELEMETRY

## 2022-12-03 PROCEDURE — 93010 ELECTROCARDIOGRAM REPORT: CPT | Performed by: INTERNAL MEDICINE

## 2022-12-03 PROCEDURE — 99233 SBSQ HOSP IP/OBS HIGH 50: CPT | Performed by: STUDENT IN AN ORGANIZED HEALTH CARE EDUCATION/TRAINING PROGRAM

## 2022-12-03 PROCEDURE — 63600000 HC DRUGS/DETAIL CODE: Performed by: INTERNAL MEDICINE

## 2022-12-03 PROCEDURE — 83735 ASSAY OF MAGNESIUM: CPT | Performed by: STUDENT IN AN ORGANIZED HEALTH CARE EDUCATION/TRAINING PROGRAM

## 2022-12-03 PROCEDURE — 80048 BASIC METABOLIC PNL TOTAL CA: CPT | Performed by: INTERNAL MEDICINE

## 2022-12-03 PROCEDURE — 63600000 HC DRUGS/DETAIL CODE: Performed by: STUDENT IN AN ORGANIZED HEALTH CARE EDUCATION/TRAINING PROGRAM

## 2022-12-03 PROCEDURE — 25000000 HC PHARMACY GENERAL: Performed by: STUDENT IN AN ORGANIZED HEALTH CARE EDUCATION/TRAINING PROGRAM

## 2022-12-03 PROCEDURE — 36415 COLL VENOUS BLD VENIPUNCTURE: CPT | Performed by: INTERNAL MEDICINE

## 2022-12-03 RX ORDER — TRAMADOL HYDROCHLORIDE 50 MG/1
50 TABLET ORAL 2 TIMES DAILY PRN
Status: DISCONTINUED | OUTPATIENT
Start: 2022-12-03 | End: 2022-12-07 | Stop reason: HOSPADM

## 2022-12-03 RX ADMIN — PANTOPRAZOLE SODIUM 40 MG: 40 TABLET, DELAYED RELEASE ORAL at 08:55

## 2022-12-03 RX ADMIN — AMLODIPINE BESYLATE 5 MG: 5 TABLET ORAL at 08:56

## 2022-12-03 RX ADMIN — TRAMADOL HYDROCHLORIDE 50 MG: 50 TABLET, COATED ORAL at 20:42

## 2022-12-03 RX ADMIN — PAROXETINE 20 MG: 20 TABLET, FILM COATED ORAL at 08:56

## 2022-12-03 RX ADMIN — LEVOTHYROXINE SODIUM 75 MCG: 75 TABLET ORAL at 05:36

## 2022-12-03 RX ADMIN — ROSUVASTATIN CALCIUM 10 MG: 10 TABLET, FILM COATED ORAL at 08:56

## 2022-12-03 RX ADMIN — LOSARTAN POTASSIUM 50 MG: 50 TABLET, FILM COATED ORAL at 08:55

## 2022-12-03 RX ADMIN — CETIRIZINE HYDROCHLORIDE 10 MG: 10 TABLET, FILM COATED ORAL at 08:56

## 2022-12-03 RX ADMIN — CEFTRIAXONE SODIUM 1 G: 1 INJECTION, POWDER, FOR SOLUTION INTRAMUSCULAR; INTRAVENOUS at 19:50

## 2022-12-03 RX ADMIN — MAGNESIUM SULFATE HEPTAHYDRATE 2 G: 40 INJECTION, SOLUTION INTRAVENOUS at 08:55

## 2022-12-03 RX ADMIN — ENOXAPARIN SODIUM 30 MG: 40 INJECTION SUBCUTANEOUS at 18:32

## 2022-12-03 RX ADMIN — POTASSIUM CHLORIDE 40 MEQ: 750 TABLET, EXTENDED RELEASE ORAL at 08:55

## 2022-12-03 RX ADMIN — Medication 100 MCG: at 08:56

## 2022-12-03 RX ADMIN — MOMETASONE FUROATE AND FORMOTEROL FUMARATE DIHYDRATE 2 PUFF: 100; 5 AEROSOL RESPIRATORY (INHALATION) at 19:51

## 2022-12-03 RX ADMIN — AZITHROMYCIN MONOHYDRATE 500 MG: 500 INJECTION, POWDER, LYOPHILIZED, FOR SOLUTION INTRAVENOUS at 20:41

## 2022-12-03 NOTE — ASSESSMENT & PLAN NOTE
Presents with cough shortness of breath and found to be hypoxic; CXR not impressive for acute infiltrate   Negative RSV/flu/COVID  Received ceftriaxone and azithromycin in the ER for concerns for community-acquired pneumonia vs brochitis  Sputum culture sent: normal leticia.   PT/OT evaluation appreciated --> recommending dispo to SNF for rehabilitation. Patient and daughter in agreement.     12/7: Patient remains on 2LO2 NC; SpO2 maintained 93-94% on 2LO2NC. She reports improvement in her cough and denies any worsening SOB on exam. She remains afebrile and hemodynamically stable.     Plan-   - Transition to PO Azithromycin and Ceftin at discharge to complete total of 7-day course of treatment.  - Continue supportive care with mucolytics and oxygen  - Continue with ICS/laba given bronchospasm and asthma history, prn albuterol  - Stable for discharge to SNF - [facility]

## 2022-12-03 NOTE — ASSESSMENT & PLAN NOTE
On multiple antihypertensive agents  Continue amlodipine along with losartan  Hold chlorthalidone at this time and restart as needed

## 2022-12-03 NOTE — ASSESSMENT & PLAN NOTE
On admission, K+ 3.1; received replacement on admission  With mild hypokalemia    12/7: K+ 3.8; PRN K+ ordered for replacement.     Plan-   - Resolved; stable for discharge

## 2022-12-03 NOTE — PLAN OF CARE
Plan of Care Review  Plan of Care Reviewed With: patient  Progress: no change  Outcome Summary: pt oriented to care setting; continues on 2L O2 NC, NSS continues to run; pt OOB to BR with minimal assistance; pt denies pain, reports SOB while ambulating; sleep/rest promoted

## 2022-12-03 NOTE — ASSESSMENT & PLAN NOTE
Hold presents with cough shortness of breath and found to be hypoxic and also with chest x-ray concerning for an infiltrate along with abnormal exam  Negative RSV/flu/COVID  Received ceftriaxone and azithromycin in the ER for concerns for community-acquired pneumonia  Continue the same antibiotics  Continue supportive care with mucolytics and oxygen

## 2022-12-03 NOTE — ASSESSMENT & PLAN NOTE
BP's appear stable.  On multiple antihypertensive agents at home    Plan-   - Continue amlodipine along with losartan  - Stable for discharge

## 2022-12-03 NOTE — PROGRESS NOTES
Hospital Medicine Service -  Daily Progress Note       SUBJECTIVE   Interval History: LESLEYO. Tells me overall she feels much better today. No complaints. Has been afebrile since initial presentation. She explicitly requested I not update her daughter today. She does believe she has a history of asthma but has not used an inhaler in years.      OBJECTIVE      Vital signs in last 24 hours:  Temp:  [36.5 °C (97.7 °F)-39 °C (102.2 °F)] 37.2 °C (99 °F)  Heart Rate:  [] 98  Resp:  [22-36] 22  BP: (109-162)/(55-74) 154/72  No intake or output data in the 24 hours ending 12/03/22 0695    PHYSICAL EXAMINATION      Physical Exam  Addendum: PEx for 12/3 (not included in original note)  Vitals and nursing note reviewed.   Constitutional:       General: She is not in acute distress.     Appearance: Normal appearance. She is ill-appearing.   HENT:      Head: Normocephalic and atraumatic.   Eyes:      Extraocular Movements: Extraocular movements intact.   Cardiovascular:      Rate and Rhythm: Normal rate.   Pulmonary:      Effort: Pulmonary effort is normal. No respiratory distress.      Breath sounds: Diffuse crackles with expiratory wheezing noted     Comments: On 3L NC  Abdominal:      General: There is no distension.      Palpations: Abdomen is soft.   Musculoskeletal:      Cervical back: Neck supple.      Right lower leg: No edema.      Left lower leg: No edema.   Skin:     General: Skin is warm and dry.   Neurological:      Mental Status: She is alert.      Cranial Nerves: No cranial nerve deficit.   Psychiatric:         Mood and Affect: Mood normal.           LINES, CATHETERS, DRAINS, AIRWAYS, AND WOUNDS   Lines, Drains, and Airways:  Wounds (agree with documentation and present on admission):  Peripheral IV (Adult) 12/02/22 Anterior;Right Forearm (Active)   Number of days: 1         Comments:      LABS / IMAGING / TELE      Labs  Results from last 7 days   Lab Units 12/02/22  1808   WBC K/uL 9.37   HEMOGLOBIN g/dL  10.9*   HEMATOCRIT % 31.8*   PLATELETS K/uL 237     Results from last 7 days   Lab Units 12/03/22  0511 12/02/22  1808   SODIUM mEQ/L 133* 132*   POTASSIUM mEQ/L 3.5* 3.1*   CHLORIDE mEQ/L 97* 93*   CO2 mEQ/L 26 27   BUN mg/dL 13 15   CREATININE mg/dL 0.5* 0.7   CALCIUM mg/dL 8.4* 9.1   ALBUMIN g/dL  --  3.8   BILIRUBIN TOTAL mg/dL  --  1.8*   ALK PHOS IU/L  --  88   ALT IU/L  --  24   AST IU/L  --  32   GLUCOSE mg/dL 134* 135*     Results from last 7 days   Lab Units 12/02/22  1811   MAGNESIUM mg/dL 1.2*         SARS-CoV-2 (COVID-19) (no units)   Date/Time Value   12/02/2022 1807 Negative       Imaging  I have independently reviewed the pertinent imaging from the last 24 hrs.    ECG/Telemetry  I have independently reviewed the telemetry. No events for the last 24 hours.    ASSESSMENT AND PLAN      * Pneumonia of right lung due to infectious organism  Assessment & Plan  Hold presents with cough shortness of breath and found to be hypoxic and also with chest x-ray concerning for an infiltrate along with abnormal exam  Negative RSV/flu/COVID  Received ceftriaxone and azithromycin in the ER for concerns for community-acquired pneumonia  Continue the same antibiotics  Continue supportive care with mucolytics and oxygen  Add ICS/laba given bronchospasm and asthma history, prn albuterol    Hyperlipidemia  Assessment & Plan  Continue statin    GERD (gastroesophageal reflux disease)  Assessment & Plan  Continue PPI    Primary hypertension  Assessment & Plan  On multiple antihypertensive agents  Continue amlodipine along with losartan  Hold chlorthalidone given hyponatremia at this time and restart as needed    Hypokalemia  Assessment & Plan  With mild hypokalemia  Repleted  Recheck  Trend Mg as well    Other specified hypothyroidism  Assessment & Plan  Continue levothyroxine       VTE Assessment: Padua VTE Score: 5  VTE Prophylaxis:  Current anticoagulants:  enoxaparin (LOVENOX) syringe 30 mg, subcutaneous, Daily  (6p)      Code Status: DNR (A.N.D.)      Estimated Discharge Date: 12/4/2022     Disposition Planning: Pending improving respiratory function.      Fabby Flores MD  12/3/2022

## 2022-12-03 NOTE — H&P
Hospital Medicine Service -  History & Physical        CHIEF COMPLAINT     Worsening cough, fever and chills     HISTORY OF PRESENT ILLNESS        Merced Robin is a 89 y.o. female with a history of breast cancer, hypertension, hypothyroidism with other conditions as below comes with above symptoms.    Patient states that since Tuesday she started having sore throat and nasal congestion along with cough.  This continued to worsen through the day and has been having greenish discharge.  Without any blood or brownish discoloration of the phlegm.  She also has been having some fevers and chills.  States that she has some difficulty breathing but was not bothering her much.  She denies any chest pain.  States that her grandson who is in eighth grade is sick with similar symptoms as well. Denies any abdominal pain, nausea, vomiting, diarrhea or constipation, lightheadedness palpitations, any change or new urinary symptoms.  Endorses chronic ptosis of the left eye.    PAST MEDICAL AND SURGICAL HISTORY        Past Medical History:   Diagnosis Date   • Breast cancer (CMS/HCC)    • GERD (gastroesophageal reflux disease) 2/20/2022   • Human metapneumovirus pneumonia 2/21/2022   • Hyperlipidemia 2/20/2022   • Hypertension    • Hypothyroidism        Past Surgical History:   Procedure Laterality Date   • BREAST LUMPECTOMY Bilateral    • HIP SURGERY Right    • REPLACEMENT TOTAL KNEE Right    • SHOULDER SURGERY Bilateral        MEDICATIONS        Prior to Admission medications    Medication Sig Start Date End Date Taking? Authorizing Provider   amLODIPine (NORVASC) 5 mg tablet Take 5 mg by mouth daily.    Halley Espino MD   calcium citrate-vitamin D3 (CITRACAL+D) 315 mg-5 mcg (200 unit) per tablet Take 1 tablet by mouth once daily. 4/19/21   Halley Espino MD   cetirizine (ZyrTEC) 10 mg tablet Take 10 mg by mouth daily.    Halley Espino MD   cyanocobalamin (VITAMIN B12) 100 mcg tablet Take 100 mcg by mouth  "daily.    Halley Espino MD   ipratropium-albuteroL (DUO-NEB) 0.5-2.5 mg/3 mL nebulizer solution Take 3 mL by nebulization every 6 (six) hours. 2/24/22 3/26/22  Valerie Shah MD   lansoprazole (PREVACID SOLUTAB) 30 mg disintegrating tablet Take 30 mg by mouth daily before breakfast.    Halley Espino MD   levothyroxine (SYNTHROID) 75 mcg tablet Take 75 mcg by mouth daily.    Halley Espino MD   losartan (COZAAR) 50 mg tablet Take 50 mg by mouth daily.    Halley Espino MD   multivitamin with minerals (DAILY MULTIVITAMIN-MINERALS) tablet Take 1 tablet by mouth daily.    Halley Espino MD   PARoxetine (PAXIL) 20 mg tablet Take 20 mg by mouth daily.    Halley Espino MD   rosuvastatin (CRESTOR) 10 mg tablet Take 10 mg by mouth daily.    Halley Espino MD       ALLERGIES        Penicillin g sodium and Sulfa (sulfonamide antibiotics)    FAMILY HISTORY        History reviewed. No pertinent family history.    SOCIAL HISTORY        Social History     Socioeconomic History   • Marital status:      Spouse name: None   • Number of children: None   • Years of education: None   • Highest education level: None   Tobacco Use   • Smoking status: Former   • Smokeless tobacco: Never   • Tobacco comments:     smoked ages [age]   Vaping Use   • Vaping Use: Never used   Substance and Sexual Activity   • Alcohol use: Yes     Comment: rare   • Drug use: Not Currently   • Sexual activity: Defer     Social Determinants of Health     Food Insecurity: No Food Insecurity   • Worried About Running Out of Food in the Last Year: Never true   • Ran Out of Food in the Last Year: Never true       REVIEW OF SYSTEMS      All other systems reviewed and negative except as noted in HPI    PHYSICAL EXAMINATION        Visit Vitals  /63 (BP Location: Left upper arm, Patient Position: Sitting)   Pulse (!) 101   Temp 36.8 °C (98.2 °F) (Oral)   Resp (!) 24   Ht 1.651 m (5' 5\")   Wt 58.1 kg (128 lb " 1.6 oz)   SpO2 95%   BMI 21.32 kg/m²     Body mass index is 21.32 kg/m².  Physical Exam  Constitutional:       Appearance: She is normal weight.   HENT:      Head: Normocephalic and atraumatic.      Nose: No congestion.      Mouth/Throat:      Mouth: Mucous membranes are dry.   Eyes:      Pupils: Pupils are equal, round, and reactive to light.      Comments: L eye ptosis   Cardiovascular:      Rate and Rhythm: Normal rate and regular rhythm.      Pulses: Normal pulses.   Pulmonary:      Breath sounds: Rhonchi and rales present.      Comments: Some rhonchi bilaterally noted but significant Rales noted on the right side posteriorly and also with bronchial breath sounds  Abdominal:      Palpations: Abdomen is soft.      Tenderness: There is no abdominal tenderness.   Musculoskeletal:      Cervical back: Neck supple.   Neurological:      General: No focal deficit present.      Mental Status: She is alert and oriented to person, place, and time.         LABS / IMAGING / EKG        Labs  Results from last 7 days   Lab Units 12/02/22  1808   WBC K/uL 9.37   HEMOGLOBIN g/dL 10.9*   HEMATOCRIT % 31.8*   PLATELETS K/uL 237     Results from last 7 days   Lab Units 12/02/22  1808   SODIUM mEQ/L 132*   POTASSIUM mEQ/L 3.1*   CHLORIDE mEQ/L 93*   CO2 mEQ/L 27   BUN mg/dL 15   CREATININE mg/dL 0.7   GLUCOSE mg/dL 135*   CALCIUM mg/dL 9.1       Imaging  X-ray chest 1 view  Per radiology read  Findings:  Tubes and Lines: Overlying cardiac leads.     Lungs: No pneumothorax, focal consolidation, pleural effusion or pulmonary  edema.     Cardiomediastinal silhouette: Cardiomegaly. Uncoiled thoracic aorta.     Partially imaged bilateral shoulder arthroplasties.     --  IMPRESSION:  No acute cardiopulmonary disease.    On personal review, concerns for right middle lobe/right lower lobe haziness consolidation    ECG/Telemetry  I have independently reviewed the ECG. Significant findings include Sinus tachycardia with heart rate at 103 bpm,  no acute ST-T changes noted.    ASSESSMENT AND PLAN           * Pneumonia of right lung due to infectious organism  Assessment & Plan  Hold presents with cough shortness of breath and found to be hypoxic and also with chest x-ray concerning for an infiltrate along with abnormal exam  Negative RSV/flu/COVID  Received ceftriaxone and azithromycin in the ER for concerns for community-acquired pneumonia  Continue the same antibiotics  Continue supportive care with mucolytics and oxygen    Primary hypertension  Assessment & Plan  On multiple antihypertensive agents  Continue amlodipine along with losartan  Hold chlorthalidone at this time and restart as needed    Hypokalemia  Assessment & Plan  With mild hypokalemia  Repleted  Recheck    Hyperlipidemia  Assessment & Plan  Continue statin    GERD (gastroesophageal reflux disease)  Assessment & Plan  Continue PPI    Other specified hypothyroidism  Assessment & Plan  Continue levothyroxine       VTE Assessment: Padua VTE Score: 5  Code Status: DNR (A.N.D.)  Estimated discharge date: 12/4/2022     Michel Mccrary MD  12/2/2022  10:14 PM

## 2022-12-04 LAB
ANION GAP SERPL CALC-SCNC: 7 MEQ/L (ref 3–15)
BUN SERPL-MCNC: 11 MG/DL (ref 8–20)
CALCIUM SERPL-MCNC: 7.8 MG/DL (ref 8.9–10.3)
CHLORIDE SERPL-SCNC: 98 MEQ/L (ref 98–109)
CO2 SERPL-SCNC: 28 MEQ/L (ref 22–32)
CREAT SERPL-MCNC: 0.5 MG/DL (ref 0.6–1.1)
ERYTHROCYTE [DISTWIDTH] IN BLOOD BY AUTOMATED COUNT: 12.7 % (ref 11.7–14.4)
GFR SERPL CREATININE-BSD FRML MDRD: >60 ML/MIN/1.73M*2
GLUCOSE SERPL-MCNC: 109 MG/DL (ref 70–99)
GRAM STN SPEC: NORMAL
HCT VFR BLDCO AUTO: 29.8 % (ref 35–45)
HGB BLD-MCNC: 10 G/DL (ref 11.8–15.7)
MAGNESIUM SERPL-MCNC: 2.2 MG/DL (ref 1.8–2.5)
MCH RBC QN AUTO: 29.9 PG (ref 28–33.2)
MCHC RBC AUTO-ENTMCNC: 33.6 G/DL (ref 32.2–35.5)
MCV RBC AUTO: 89.2 FL (ref 83–98)
PDW BLD AUTO: 10.2 FL (ref 9.4–12.3)
PLATELET # BLD AUTO: 258 K/UL (ref 150–369)
POTASSIUM SERPL-SCNC: 3.8 MEQ/L (ref 3.6–5.1)
RBC # BLD AUTO: 3.34 M/UL (ref 3.93–5.22)
SODIUM SERPL-SCNC: 133 MEQ/L (ref 136–144)
WBC # BLD AUTO: 9.55 K/UL (ref 3.8–10.5)

## 2022-12-04 PROCEDURE — 80048 BASIC METABOLIC PNL TOTAL CA: CPT | Performed by: STUDENT IN AN ORGANIZED HEALTH CARE EDUCATION/TRAINING PROGRAM

## 2022-12-04 PROCEDURE — 36415 COLL VENOUS BLD VENIPUNCTURE: CPT | Performed by: STUDENT IN AN ORGANIZED HEALTH CARE EDUCATION/TRAINING PROGRAM

## 2022-12-04 PROCEDURE — 87205 SMEAR GRAM STAIN: CPT | Performed by: PHYSICIAN ASSISTANT

## 2022-12-04 PROCEDURE — 63700000 HC SELF-ADMINISTRABLE DRUG: Performed by: STUDENT IN AN ORGANIZED HEALTH CARE EDUCATION/TRAINING PROGRAM

## 2022-12-04 PROCEDURE — 63600000 HC DRUGS/DETAIL CODE: Performed by: INTERNAL MEDICINE

## 2022-12-04 PROCEDURE — 83735 ASSAY OF MAGNESIUM: CPT | Performed by: STUDENT IN AN ORGANIZED HEALTH CARE EDUCATION/TRAINING PROGRAM

## 2022-12-04 PROCEDURE — 85027 COMPLETE CBC AUTOMATED: CPT | Performed by: STUDENT IN AN ORGANIZED HEALTH CARE EDUCATION/TRAINING PROGRAM

## 2022-12-04 PROCEDURE — 87070 CULTURE OTHR SPECIMN AEROBIC: CPT | Performed by: PHYSICIAN ASSISTANT

## 2022-12-04 PROCEDURE — 25800000 HC PHARMACY IV SOLUTIONS: Performed by: INTERNAL MEDICINE

## 2022-12-04 PROCEDURE — 99233 SBSQ HOSP IP/OBS HIGH 50: CPT | Performed by: STUDENT IN AN ORGANIZED HEALTH CARE EDUCATION/TRAINING PROGRAM

## 2022-12-04 PROCEDURE — 63700000 HC SELF-ADMINISTRABLE DRUG: Performed by: INTERNAL MEDICINE

## 2022-12-04 PROCEDURE — 20600000 HC ROOM AND CARE INTERMEDIATE/TELEMETRY

## 2022-12-04 RX ADMIN — AZITHROMYCIN MONOHYDRATE 500 MG: 500 INJECTION, POWDER, LYOPHILIZED, FOR SOLUTION INTRAVENOUS at 21:16

## 2022-12-04 RX ADMIN — Medication 100 MCG: at 08:37

## 2022-12-04 RX ADMIN — LEVOTHYROXINE SODIUM 75 MCG: 75 TABLET ORAL at 05:55

## 2022-12-04 RX ADMIN — MOMETASONE FUROATE AND FORMOTEROL FUMARATE DIHYDRATE 2 PUFF: 100; 5 AEROSOL RESPIRATORY (INHALATION) at 18:01

## 2022-12-04 RX ADMIN — AMLODIPINE BESYLATE 5 MG: 5 TABLET ORAL at 08:37

## 2022-12-04 RX ADMIN — CEFTRIAXONE SODIUM 1 G: 1 INJECTION, POWDER, FOR SOLUTION INTRAMUSCULAR; INTRAVENOUS at 19:58

## 2022-12-04 RX ADMIN — LOSARTAN POTASSIUM 50 MG: 50 TABLET, FILM COATED ORAL at 08:37

## 2022-12-04 RX ADMIN — ENOXAPARIN SODIUM 30 MG: 40 INJECTION SUBCUTANEOUS at 18:00

## 2022-12-04 RX ADMIN — CETIRIZINE HYDROCHLORIDE 10 MG: 10 TABLET, FILM COATED ORAL at 08:37

## 2022-12-04 RX ADMIN — ROSUVASTATIN CALCIUM 10 MG: 10 TABLET, FILM COATED ORAL at 08:37

## 2022-12-04 RX ADMIN — TRAMADOL HYDROCHLORIDE 50 MG: 50 TABLET, COATED ORAL at 21:16

## 2022-12-04 RX ADMIN — PANTOPRAZOLE SODIUM 40 MG: 40 TABLET, DELAYED RELEASE ORAL at 08:37

## 2022-12-04 RX ADMIN — PAROXETINE 20 MG: 20 TABLET, FILM COATED ORAL at 08:37

## 2022-12-04 RX ADMIN — MOMETASONE FUROATE AND FORMOTEROL FUMARATE DIHYDRATE 2 PUFF: 100; 5 AEROSOL RESPIRATORY (INHALATION) at 05:52

## 2022-12-04 NOTE — PROGRESS NOTES
Hospital Medicine Service -  Daily Progress Note       SUBJECTIVE   Interval History: NAEO. Feeling relatively well today comparably. Still with relatively poor air movement and requiring O2.     Daughter called and updated     OBJECTIVE      Vital signs in last 24 hours:  Temp:  [36.4 °C (97.6 °F)-37.2 °C (99 °F)] 36.7 °C (98.1 °F)  Heart Rate:  [70-92] 77  Resp:  [18-20] 18  BP: (114-136)/(56-77) 114/72    Intake/Output Summary (Last 24 hours) at 12/4/2022 1633  Last data filed at 12/4/2022 1000  Gross per 24 hour   Intake 240 ml   Output --   Net 240 ml       PHYSICAL EXAMINATION      Physical Exam  Vitals and nursing note reviewed.   Constitutional:       General: She is not in acute distress.     Appearance: Normal appearance. She is ill-appearing.   HENT:      Head: Normocephalic and atraumatic.   Eyes:      Extraocular Movements: Extraocular movements intact.   Cardiovascular:      Rate and Rhythm: Normal rate.   Pulmonary:      Effort: Pulmonary effort is normal. No respiratory distress.      Breath sounds: No wheezing.      Comments: On 3L NC  More clear today with minimal coarseness  Abdominal:      General: There is no distension.      Palpations: Abdomen is soft.   Musculoskeletal:      Cervical back: Neck supple.      Right lower leg: No edema.      Left lower leg: No edema.   Skin:     General: Skin is warm and dry.   Neurological:      Mental Status: She is alert.      Cranial Nerves: No cranial nerve deficit.   Psychiatric:         Mood and Affect: Mood normal.            LINES, CATHETERS, DRAINS, AIRWAYS, AND WOUNDS   Lines, Drains, and Airways:  Wounds (agree with documentation and present on admission):  Peripheral IV (Adult) 12/02/22 Anterior;Right Forearm (Active)   Number of days: 2         Comments:      LABS / IMAGING / TELE      Labs  Results from last 7 days   Lab Units 12/04/22  0344 12/02/22  1808   WBC K/uL 9.55 9.37   HEMOGLOBIN g/dL 10.0* 10.9*   HEMATOCRIT % 29.8* 31.8*    PLATELETS K/uL 258 237     Results from last 7 days   Lab Units 12/04/22  0344 12/03/22  0511 12/02/22  1808   SODIUM mEQ/L 133* 133* 132*   POTASSIUM mEQ/L 3.8 3.5* 3.1*   CHLORIDE mEQ/L 98 97* 93*   CO2 mEQ/L 28 26 27   BUN mg/dL 11 13 15   CREATININE mg/dL 0.5* 0.5* 0.7   CALCIUM mg/dL 7.8* 8.4* 9.1   ALBUMIN g/dL  --   --  3.8   BILIRUBIN TOTAL mg/dL  --   --  1.8*   ALK PHOS IU/L  --   --  88   ALT IU/L  --   --  24   AST IU/L  --   --  32   GLUCOSE mg/dL 109* 134* 135*     Results from last 7 days   Lab Units 12/04/22  0344 12/03/22  0511 12/02/22  1811   MAGNESIUM mg/dL 2.2 2.0 1.2*         SARS-CoV-2 (COVID-19) (no units)   Date/Time Value   12/02/2022 1807 Negative       Imaging  I have independently reviewed the pertinent imaging from the last 24 hrs.    ECG/Telemetry  reviewed    ASSESSMENT AND PLAN      * Acute bacterial bronchitis  Assessment & Plan  Presents with cough shortness of breath and found to be hypoxic; CXR not impressive for acute infiltrate   Negative RSV/flu/COVID  Received ceftriaxone and azithromycin in the ER for concerns for community-acquired pneumonia vs brochitis  Continue the same antibiotics  Continue supportive care with mucolytics and oxygen  Add ICS/laba given bronchospasm and asthma history, prn albuterol - wheezing much improved with this. Has had duonebs prescribed previously    Hyperlipidemia  Assessment & Plan  Continue statin    GERD (gastroesophageal reflux disease)  Assessment & Plan  Continue PPI    Primary hypertension  Assessment & Plan  On multiple antihypertensive agents  Continue amlodipine along with losartan  Hold chlorthalidone given hyponatremia at this time and restart as needed    Hypokalemia  Assessment & Plan  With mild hypokalemia  Repleted  Recheck  Trend Mg as well    Other specified hypothyroidism  Assessment & Plan  Continue levothyroxine       VTE Assessment: Padua VTE Score: 5  VTE Prophylaxis:  Current anticoagulants:  enoxaparin (LOVENOX)  syringe 30 mg, subcutaneous, Daily (6p)      Code Status: DNR (A.N.D.)      Estimated Discharge Date: 12/5/2022     Disposition Planning: Will need to be ambulated off O2      Fabby Flores MD  12/4/2022

## 2022-12-04 NOTE — PLAN OF CARE
Plan of Care Review  Plan of Care Reviewed With: patient  Progress: improving  Outcome Summary: pt continues on 3L O2 NC with infrequent productive cough; c/o of back pain managed with medication and position adjustment in bed; IV fluids continue to run, IV abx administered; sleep/rest promoted   none

## 2022-12-04 NOTE — PLAN OF CARE
Plan of Care Review  Plan of Care Reviewed With: patient  Progress: improving  Outcome Summary: Pt weaned to 2LNC, Up in chair for meals, walked in room SpO2 95% on 2L. Continued on abx. IS encouraged.

## 2022-12-04 NOTE — PLAN OF CARE
Problem: Adult Inpatient Plan of Care  Goal: Readiness for Transition of Care  Intervention: Mutually Develop Transition Plan  Flowsheets (Taken 12/4/2022 1322)  Assistive Device/Animal Currently Used at Home:   commode, 3-in-1   stair glide   grab bar   cane, straight  Anticipated Changes Related to Illness: none  Transportation Concerns: car, none  Readmission Within the Last 30 Days: no previous admission in last 30 days  Patient/Family Anticipated Services at Transition: home health care  Patient/Family Anticipates Transition to: home with family  Transportation Anticipated: family or friend will provide  Concerns to be Addressed: discharge planning   Pt lives in a house with her daughter, she has stair glide to her bedroom upstairs.  Her daughter Leta was present, she does all the grocery shopping and drives her to doctors appointments. No home O2.  She has used [facility] in the past and would like them again if needed.  She has been to [place] in the past, if needed she would like to return to [place] or [place].  Her daughter will transport her home.

## 2022-12-04 NOTE — PLAN OF CARE
Plan of Care Review  Plan of Care Reviewed With: patient  Progress: no change  Outcome Summary: Pt continued on 3L NC. IVF continued. Pt up in chair most of the day. Educated on incentive spirometer. Tramadol ordered for pain.

## 2022-12-05 LAB
ANION GAP SERPL CALC-SCNC: 9 MEQ/L (ref 3–15)
BUN SERPL-MCNC: 16 MG/DL (ref 8–20)
CALCIUM SERPL-MCNC: 8.2 MG/DL (ref 8.9–10.3)
CHLORIDE SERPL-SCNC: 100 MEQ/L (ref 98–109)
CO2 SERPL-SCNC: 27 MEQ/L (ref 22–32)
CREAT SERPL-MCNC: 0.6 MG/DL (ref 0.6–1.1)
GFR SERPL CREATININE-BSD FRML MDRD: >60 ML/MIN/1.73M*2
GLUCOSE SERPL-MCNC: 112 MG/DL (ref 70–99)
POTASSIUM SERPL-SCNC: 3.3 MEQ/L (ref 3.6–5.1)
SODIUM SERPL-SCNC: 136 MEQ/L (ref 136–144)

## 2022-12-05 PROCEDURE — 63600000 HC DRUGS/DETAIL CODE: Performed by: INTERNAL MEDICINE

## 2022-12-05 PROCEDURE — 97166 OT EVAL MOD COMPLEX 45 MIN: CPT | Mod: GO

## 2022-12-05 PROCEDURE — 80048 BASIC METABOLIC PNL TOTAL CA: CPT | Performed by: STUDENT IN AN ORGANIZED HEALTH CARE EDUCATION/TRAINING PROGRAM

## 2022-12-05 PROCEDURE — 99233 SBSQ HOSP IP/OBS HIGH 50: CPT | Performed by: HOSPITALIST

## 2022-12-05 PROCEDURE — 25800000 HC PHARMACY IV SOLUTIONS: Performed by: INTERNAL MEDICINE

## 2022-12-05 PROCEDURE — 63700000 HC SELF-ADMINISTRABLE DRUG

## 2022-12-05 PROCEDURE — 36415 COLL VENOUS BLD VENIPUNCTURE: CPT | Performed by: STUDENT IN AN ORGANIZED HEALTH CARE EDUCATION/TRAINING PROGRAM

## 2022-12-05 PROCEDURE — 97162 PT EVAL MOD COMPLEX 30 MIN: CPT | Mod: GP

## 2022-12-05 PROCEDURE — 20600000 HC ROOM AND CARE INTERMEDIATE/TELEMETRY

## 2022-12-05 PROCEDURE — 63700000 HC SELF-ADMINISTRABLE DRUG: Performed by: INTERNAL MEDICINE

## 2022-12-05 RX ORDER — POTASSIUM CHLORIDE 750 MG/1
40 TABLET, EXTENDED RELEASE ORAL AS NEEDED
Status: DISCONTINUED | OUTPATIENT
Start: 2022-12-05 | End: 2022-12-07 | Stop reason: HOSPADM

## 2022-12-05 RX ORDER — POTASSIUM CHLORIDE 750 MG/1
20 TABLET, EXTENDED RELEASE ORAL AS NEEDED
Status: DISCONTINUED | OUTPATIENT
Start: 2022-12-05 | End: 2022-12-07 | Stop reason: HOSPADM

## 2022-12-05 RX ADMIN — MOMETASONE FUROATE AND FORMOTEROL FUMARATE DIHYDRATE 2 PUFF: 100; 5 AEROSOL RESPIRATORY (INHALATION) at 17:47

## 2022-12-05 RX ADMIN — CETIRIZINE HYDROCHLORIDE 10 MG: 10 TABLET, FILM COATED ORAL at 08:43

## 2022-12-05 RX ADMIN — ROSUVASTATIN CALCIUM 10 MG: 10 TABLET, FILM COATED ORAL at 08:43

## 2022-12-05 RX ADMIN — MOMETASONE FUROATE AND FORMOTEROL FUMARATE DIHYDRATE 2 PUFF: 100; 5 AEROSOL RESPIRATORY (INHALATION) at 08:46

## 2022-12-05 RX ADMIN — CEFTRIAXONE SODIUM 1 G: 1 INJECTION, POWDER, FOR SOLUTION INTRAMUSCULAR; INTRAVENOUS at 21:05

## 2022-12-05 RX ADMIN — LOSARTAN POTASSIUM 50 MG: 50 TABLET, FILM COATED ORAL at 08:43

## 2022-12-05 RX ADMIN — PAROXETINE 20 MG: 20 TABLET, FILM COATED ORAL at 08:43

## 2022-12-05 RX ADMIN — LEVOTHYROXINE SODIUM 75 MCG: 75 TABLET ORAL at 06:44

## 2022-12-05 RX ADMIN — AZITHROMYCIN MONOHYDRATE 500 MG: 500 INJECTION, POWDER, LYOPHILIZED, FOR SOLUTION INTRAVENOUS at 21:42

## 2022-12-05 RX ADMIN — SENNOSIDES 1 TABLET: 8.6 TABLET, FILM COATED ORAL at 08:42

## 2022-12-05 RX ADMIN — PANTOPRAZOLE SODIUM 40 MG: 40 TABLET, DELAYED RELEASE ORAL at 08:43

## 2022-12-05 RX ADMIN — Medication 100 MCG: at 08:43

## 2022-12-05 RX ADMIN — POTASSIUM CHLORIDE 40 MEQ: 750 TABLET, EXTENDED RELEASE ORAL at 08:44

## 2022-12-05 RX ADMIN — ENOXAPARIN SODIUM 30 MG: 40 INJECTION SUBCUTANEOUS at 17:46

## 2022-12-05 RX ADMIN — AMLODIPINE BESYLATE 5 MG: 5 TABLET ORAL at 08:43

## 2022-12-05 ASSESSMENT — COGNITIVE AND FUNCTIONAL STATUS - GENERAL
MOVING TO AND FROM BED TO CHAIR: 3 - A LITTLE
DRESSING REGULAR LOWER BODY CLOTHING: 2 - A LOT
STANDING UP FROM CHAIR USING ARMS: 3 - A LITTLE
DRESSING REGULAR UPPER BODY CLOTHING: 3 - A LITTLE
HELP NEEDED FOR BATHING: 3 - A LITTLE
TOILETING: 2 - A LOT
AFFECT: WFL
AFFECT: WFL
HELP NEEDED FOR PERSONAL GROOMING: 3 - A LITTLE
CLIMB 3 TO 5 STEPS WITH RAILING: 2 - A LOT
WALKING IN HOSPITAL ROOM: 3 - A LITTLE
EATING MEALS: 4 - NONE

## 2022-12-05 NOTE — HOSPITAL COURSE
Merced is a 89 y.o. female admitted on 12/2/2022 with Hypokalemia [E87.6]  Hypomagnesemia [E83.42]  Pneumonia due to infectious organism, unspecified laterality, unspecified part of lung [J18.9]. Principal problem is Acute bacterial bronchitis.    Past Medical History  Merced has a past medical history of Breast cancer (CMS/[facility]), GERD (gastroesophageal reflux disease) (2/20/2022), Human metapneumovirus pneumonia (2/21/2022), Hyperlipidemia (2/20/2022), Hypertension, and Hypothyroidism.    History of Present Illness   Merced Robin is a 89 y.o. female with a history of breast cancer, hypertension, hypothyroidism with other conditions as below comes with worsening cough, fever and chills. Impression: PNA.

## 2022-12-05 NOTE — PLAN OF CARE
Problem: Adult Inpatient Plan of Care  Goal: Plan of Care Review  Flowsheets (Taken 12/5/2022 6009)  Progress: no change  Plan of Care Reviewed With:  • patient  • daughter  Outcome Summary: Pt reports poor intake and appeite for 1 week PTA. However, pt states that appetite and PO intake have improved over the past 2 days. Pt reports consuming 50-75% of meals. Pt does not c/o trouble chewing or swallowing or N/V/D at this time. Per EMR, pt currently weighs 128# and pt reports weighing 134# 1 week ago. If correct, this would be a significant weight loss of 5% within 1 week. Based on prior weights from February of this year, pt weighed 120-126#. However, as pt's appetite has improved over admission, would not send supplements at this time.     Goals:  1. Pt to meet >/= 75% of nutritional needs via PO    Recommendations:  1. Agree within regular diet  2. Monitor PO intake and weights  3. Encourage good PO intake  4. If PO intake falls to <50%, recommend boost plus supplements BID  5. Monitor labs (glucose and electrolytes)

## 2022-12-05 NOTE — PROGRESS NOTES
Patient: Merced Robin  Location:  [facility] 3B 0330  MRN:  [id number]  Today's date:  12/5/2022     RN cleared for therapy, updated on performance and VS.    Pt left supine in bed c alarm active, on 2 L NC, call bell in reach.     Merced is a 89 y.o. female admitted on 12/2/2022 with Hypokalemia [E87.6]  Hypomagnesemia [E83.42]  Pneumonia due to infectious organism, unspecified laterality, unspecified part of lung [J18.9]. Principal problem is Acute bacterial bronchitis.    Past Medical History  Merced has a past medical history of Breast cancer (CMS/[facility]), GERD (gastroesophageal reflux disease) (2/20/2022), Human metapneumovirus pneumonia (2/21/2022), Hyperlipidemia (2/20/2022), Hypertension, and Hypothyroidism.    History of Present Illness   Merced Robin is a 89 y.o. female with a history of breast cancer, hypertension, hypothyroidism with other conditions as below comes with worsening cough, fever and chills. Impression: PNA.       PT Vitals    Date/Time Pulse HR Source SpO2 Pt Activity O2 Therapy O2 Del Method O2 Flow Rate BP BP Location BP Method Pt Position [facility]   12/05/22 1420 94 Monitor 92 % At rest Supplemental oxygen Nasal cannula 2 L/min 129/58 Right upper arm Automatic Lying    12/05/22 1431 122 Monitor 90 % -- p activity Supplemental oxygen Nasal cannula 2 L/min 126/57 Right upper arm Automatic Lying    12/05/22 1432 -- -- 96 % -- p 1 min rest supine Supplemental oxygen Nasal cannula 2 L/min -- -- -- -- CK      PT Pain    Date/Time Pain Type Rating: Rest Rating: Activity [facility]   12/05/22 1420 Pain Assessment 0 0 CK          Prior Living Environment    Flowsheet Row Most Recent Value   People in Home child(gricelda), adult   Current Living Arrangements home   Living Environment Comment lives c dtr and her family in 2SH c stairglide to 2nd flr, 4 JEAN PAUL        Prior Level of Function    Flowsheet Row Most Recent Value   Ambulation assistive equipment   Transferring assistive equipment   Toileting assistive  equipment   Bathing independent   Dressing independent   Eating independent   Communication understands/communicates without difficulty   Prior Level of Function Comment per pt she was IND in amb and ADL c rollator vs SPC, raised toilet, dtr assists c IADL and drives to her appointments   Assistive Device Currently Used at Home commode, 3-in-1, stair glide, grab bar, cane, straight  [rollator]           PT Evaluation and Treatment - 12/05/22 1418        PT Time Calculation    Start Time 1418     Stop Time 1434     Time Calculation (min) 16 min        Session Details    Document Type initial evaluation     Mode of Treatment physical therapy   cotx c OT NB       General Information    Patient Profile Reviewed yes     Onset of Illness/Injury or Date of Surgery 12/02/22     Referring Physician Mark     Patient/Family/Caregiver Comments/Observations RN cleared     General Observations of Patient rec'd supine in bed, agreeable to therapy     Existing Precautions/Restrictions fall         Services    Do You Speak a Language Other Than English at Home? no        Cognition/Psychosocial    Affect/Mental Status (Cognition) WFL     Orientation Status (Cognition) oriented x 4     Follows Commands (Cognition) WFL     Cognitive Function WFL        Sensory    Hearing Status hearing impairment, bilaterally        Vision Assessment/Intervention    Visual Impairment/Limitations corrective lenses for reading        Sensory Assessment (Somatosensory)    Sensory Assessment (Somatosensory) LE sensation intact        Range of Motion (ROM)    Range of Motion ROM is WFL;bilateral lower extremities        Strength (Manual Muscle Testing)    Hip, Left (Strength) 3+     Knee, Left (Strength) 3+     Ankle, Left (Strength) 3+     Hip, Right (Strength) 3+     Knee, Right (Strength) 3+     Ankle, Right (Strength) 3+        Bed Mobility    [place], Supine to Sit supervision     [place], Sit to Supine supervision     Assistive  Device head of bed elevated;bed rails     Comment (Bed Mobility) OOB to R, assist DEP for scooting to HOB        Sit to Stand Transfer    [place], Sit to Stand Transfer minimum assist (75% or more patient effort)     Verbal Cues safety;technique;hand placement     Assistive Device cane, straight     Comment from EOB, attempt x 2        Stand to Sit Transfer    [place], Stand to Sit Transfer minimum assist (75% or more patient effort)     Verbal Cues hand placement;safety;technique     Assistive Device cane, straight     Comment assist to control descent        Gait Training    [place], Gait 1 person assist;minimum assist (75% or more patient effort)     Assistive Device cane, straight     Distance in Feet 24 feet     Pattern (Gait) step-through     Deviations/Abnormal Patterns (Gait) gait speed decreased;step length decreased;stride length decreased     Comment (Gait/Stairs) mild unsteadiness, no overt LOB, limited by SOB/activity elise - O2 sat 90% on 2L c activity, pt does not wear O2 at baseline        Stairs Training    [place], Stairs not tested        Safety Issues, Functional Mobility    Impairments Affecting Function (Mobility) balance;endurance/activity tolerance;strength;shortness of breath        Balance    Static Sitting Balance WFL     Dynamic Sitting Balance mild impairment     Static Standing Balance mild impairment     Dynamic Standing Balance mild impairment     Comment, Balance min A for OOB activity c SPC        Motor Skills    Coordination --   grossly WFL    Functional Endurance fair (-)        AM-PAC (TM) - Mobility (Current Function)    Turning from your back to your side while in a flat bed without using bedrails? 4 - None     Moving from lying on your back to sitting on the side of a flat bed without using bedrails? 4 - None     Moving to and from a bed to a chair? 3 - A Little     Standing up from a chair using your arms? 3 - A Little     To walk in a hospital room? 3 - A  Little     Climbing 3-5 steps with a railing? 2 - A Lot     AM-PAC (TM) Mobility Score 19        Assessment/Plan (PT)    Daily Outcome Statement Pt is a 89 yof seen for initial eval p admission for PNA. Completed bed mob c S, transfers and amb 24' c SPC at  min A. Limited by strength, balance and endurance c O2 desat to 90% c activity on 2L (not on O2 at baseline.) Cont services to inc mobility and independence. Recommend SNF, pending progress pt may return home c home PT and assist if adequate assist available.     Rehab Potential good, to achieve stated therapy goals     Therapy Frequency 3-5 times/wk     Planned Therapy Interventions balance training;bed mobility training;gait training;home exercise program;patient/family education;stair training;strengthening;transfer training               PT Assessment/Plan    Flowsheet Row Most Recent Value   PT Recommended Discharge Disposition skilled nursing facility at 12/05/2022 1418   Anticipated Equipment Needs at Discharge (PT) none at 12/05/2022 1418   Patient/Family Therapy Goals Statement to go to rehab at 12/05/2022 1418                    Education Documentation  Transfers, taught by Mariluz Washington PT at 12/5/2022  3:37 PM.  Learner: Patient  Readiness: Acceptance  Method: Explanation  Response: Verbalizes Understanding  Comment: Pt edu on role of PT/POC, safety c transfers    Gait Training, taught by Mariluz Washignton PT at 12/5/2022  3:37 PM.  Learner: Patient  Readiness: Acceptance  Method: Explanation  Response: Verbalizes Understanding  Comment: Pt edu on role of PT/POC, safety c transfers    Bed Mobility, taught by Mariluz Washington PT at 12/5/2022  3:37 PM.  Learner: Patient  Readiness: Acceptance  Method: Explanation  Response: Verbalizes Understanding  Comment: Pt edu on role of PT/POC, safety c transfers          PT Goals    Flowsheet Row Most Recent Value   Transfer Goal 1    Activity/Assistive Device all transfers at 12/05/2022 1418    [place] supervision required at 12/05/2022 1418   Time Frame by discharge at 12/05/2022 1418   Progress/Outcome goal ongoing at 12/05/2022 1418   Gait Training Goal 1    Activity/Assistive Device gait (walking locomotion), assistive device use at 12/05/2022 1418   [place] supervision required at 12/05/2022 1418   Distance 150 at 12/05/2022 1418   Time Frame by discharge at 12/05/2022 1418   Progress/Outcome goal ongoing at 12/05/2022 1418   Stairs Goal 1    Activity/Assistive Device stairs, all skills at 12/05/2022 1418   [place] supervision required at 12/05/2022 1418   Number of Stairs 4 at 12/05/2022 1418   Time Frame by discharge at 12/05/2022 1418   Progress/Outcome goal ongoing at 12/05/2022 1418

## 2022-12-05 NOTE — PLAN OF CARE
Plan of Care Review  Plan of Care Reviewed With: patient  Progress: improving  Outcome Summary: pt O2 sat stable on 2L NC overnight; pt reports improved cough and decreased SOB; IV abx administered; sleep/rest promoted

## 2022-12-05 NOTE — PLAN OF CARE
Problem: Adult Inpatient Plan of Care  Goal: Plan of Care Review  Outcome: Progressing  Flowsheets (Taken 12/5/2022 4347)  Progress: improving  Plan of Care Reviewed With: patient  Outcome Summary: OT eval complete     Problem: Self-Care Deficit  Goal: Improved Ability to Complete Activities of Daily Living  Outcome: Progressing

## 2022-12-05 NOTE — PLAN OF CARE
Problem: Adult Inpatient Plan of Care  Goal: Plan of Care Review  Flowsheets (Taken 12/5/2022 0016)  Plan of Care Reviewed With: (Treatment Care Team) --   Per IDR patient is on 2L O2 and on iv abx. SW alerted PT to see patient for d/c recommendations. SW will continue to follow.     Dispo:  Home vs SNF

## 2022-12-05 NOTE — PROGRESS NOTES
Hospital Medicine Service -  Daily Progress Note       SUBJECTIVE   Interval History: Patient seen and examined this morning. No acute events overnight. Patient denies chest pain and any worsening shortness of breath. She remains on 2LO2 NC and states her cough is improvement. Patient denies nausea/vomiting and diarrhea. Called and spoke to patient's daughter Leta and provided update on plan of care.    OBJECTIVE      Vital signs in last 24 hours:  Temp:  [36.4 °C (97.5 °F)-36.8 °C (98.2 °F)] 36.8 °C (98.2 °F)  Heart Rate:  [70-97] 78  Resp:  [18-20] 19  BP: (110-149)/(52-72) 143/67    Intake/Output Summary (Last 24 hours) at 12/5/2022 0949  Last data filed at 12/4/2022 1000  Gross per 24 hour   Intake 240 ml   Output --   Net 240 ml       PHYSICAL EXAMINATION      Physical Exam  Vitals reviewed.   Constitutional:       Appearance: She is ill-appearing. She is not diaphoretic.   HENT:      Nose: Congestion present.   Cardiovascular:      Rate and Rhythm: Normal rate and regular rhythm.      Pulses: Normal pulses.      Heart sounds: Normal heart sounds, S1 normal and S2 normal. No murmur heard.  Pulmonary:      Effort: Pulmonary effort is normal.      Comments: Minimal coarse crackles noted RUL  Non-productive loose cough   Abdominal:      Palpations: Abdomen is soft.      Tenderness: There is no abdominal tenderness.   Musculoskeletal:      Right lower leg: No edema.      Left lower leg: No edema.   Skin:     General: Skin is warm.      Comments: Dry, frail skin throughout    Neurological:      General: No focal deficit present.      Mental Status: She is alert and oriented to person, place, and time.   Psychiatric:         Mood and Affect: Mood normal.            LINES, CATHETERS, DRAINS, AIRWAYS, AND WOUNDS   Lines, Drains, and Airways:  Wounds (agree with documentation and present on admission):  Peripheral IV (Adult) 12/02/22 Anterior;Right Forearm (Active)   Number of days: 3     None    Comments:       LABS / IMAGING / TELE      Labs   Results from last 7 days   Lab Units 12/04/22  0344 12/03/22  0511 12/02/22  1811   MAGNESIUM mg/dL 2.2 2.0 1.2*      Results from last 7 days   Lab Units 12/05/22  0353 12/04/22  0344 12/03/22  0511 12/02/22  1808   SODIUM mEQ/L 136 133* 133* 132*   POTASSIUM mEQ/L 3.3* 3.8 3.5* 3.1*   CHLORIDE mEQ/L 100 98 97* 93*   CO2 mEQ/L 27 28 26 27   BUN mg/dL 16 11 13 15   CREATININE mg/dL 0.6 0.5* 0.5* 0.7   CALCIUM mg/dL 8.2* 7.8* 8.4* 9.1   ALBUMIN g/dL  --   --   --  3.8   BILIRUBIN TOTAL mg/dL  --   --   --  1.8*   ALK PHOS IU/L  --   --   --  88   ALT IU/L  --   --   --  24   AST IU/L  --   --   --  32   GLUCOSE mg/dL 112* 109* 134* 135*      Results from last 7 days   Lab Units 12/04/22  0344 12/02/22  1808   WBC K/uL 9.55 9.37   HEMOGLOBIN g/dL 10.0* 10.9*   HEMATOCRIT % 29.8* 31.8*   PLATELETS K/uL 258 237   DIFF TYPE   --  Manu   NEUTROS PCT MAN %  --  71   LYMPHO PCT MAN %  --  18   MONO PCT MAN %  --  5   EOSINO PCT MAN %  --  0   BASOS PCT MAN %  --  0   BANDS PCT MAN %  --  4   SEGS ABS MAN K/uL  --  6.65   LYMPHO ABS MAN K/uL  --  1.69   MONO ABS MAN K/uL  --  0.47   EOS ABS MAN K/uL  --  0.00*   BASOS ABS MAN K/uL  --  0.00*       SARS-CoV-2 (COVID-19) (no units)   Date/Time Value   12/02/2022 1807 Negative       Imaging  I have independently reviewed the pertinent imaging from the last 24 hrs.    ECG/Telemetry  I have independently reviewed the telemetry. No events for the last 24 hours.    ASSESSMENT AND PLAN      * Acute bacterial bronchitis  Assessment & Plan  Presents with cough shortness of breath and found to be hypoxic; CXR not impressive for acute infiltrate   Negative RSV/flu/COVID  Received ceftriaxone and azithromycin in the ER for concerns for community-acquired pneumonia vs brochitis  Sputum culture sent: preliminary result young growth to date.     12/5: Patient now on 2LO2 NC; SpO2 maintained 93-95% on 2LO2NC. She reports improvement in her cough and denies  any worsening SOB on exam. She remains afebrile and hemodynamically stable.     Plan-   - Continue IV Rocephin and IV Azithromycin   - Continue supportive care with mucolytics and oxygen  - Continue with ICS/laba given bronchospasm and asthma history, prn albuterol  - Follow up with sputum culture  - PT/OT evaluation for dispo planning     Hyperlipidemia  Assessment & Plan  Continue statin    GERD (gastroesophageal reflux disease)  Assessment & Plan  Continue PPI    Primary hypertension  Assessment & Plan  BP's appear stable.  On multiple antihypertensive agents at home    Plan-   - Continue amlodipine along with losartan  - Hold chlorthalidone given hyponatremia at this time and restart as needed    Hypokalemia  Assessment & Plan  On admission, K+ 3.1; received replacement on admission  With mild hypokalemia    12/5: K+ 3.3; PRN K+ ordered for replacement.     Plan-   - Repeat BMP in AM      Other specified hypothyroidism  Assessment & Plan  Continue levothyroxine       VTE Assessment: [place] VTE Score: 5  VTE Prophylaxis:  Current anticoagulants:  enoxaparin (LOVENOX) syringe 30 mg, subcutaneous, Daily (6p)      Code Status: DNR (A.N.D.)      Estimated Discharge Date: 12/6/2022     Disposition Planning: Home when medically stable     FABIAN Cobos  12/5/2022

## 2022-12-05 NOTE — PROGRESS NOTES
Initial Spiritual Care visit on recommendation of unit nurse. Pt. Is Denominational. I had a good  visit. I prayed with pt and talked about [facility] where pt is from. Pt thanked me for visit. Spiritual Care will remain available.     Sagar Cameron,  Intern  [phone]

## 2022-12-05 NOTE — PROGRESS NOTES
Patient:  Merced Robin  Location:  [facility] 3B 0330  MRN:  [id number]  Today's date:  12/5/2022    RN cleared pt for therapy prior to session. Pt left in bed with alarm set, call bell and personal items accessible, all questions answered and all needs addressed.  RN notified of position and performance    Merced is a 89 y.o. female admitted on 12/2/2022 with Hypokalemia [E87.6]  Hypomagnesemia [E83.42]  Pneumonia due to infectious organism, unspecified laterality, unspecified part of lung [J18.9]. Principal problem is Acute bacterial bronchitis.    Past Medical History  Merced has a past medical history of Breast cancer (CMS/[facility]), GERD (gastroesophageal reflux disease) (2/20/2022), Human metapneumovirus pneumonia (2/21/2022), Hyperlipidemia (2/20/2022), Hypertension, and Hypothyroidism.    History of Present Illness   Merced Robin is a 89 y.o. female with a history of breast cancer, hypertension, hypothyroidism with other conditions as below comes with worsening cough, fever and chills. Impression: PNA.       OT Vitals    Date/Time Pulse HR Source SpO2 Pt Activity O2 Therapy O2 Del Method O2 Flow Rate BP BP Location BP Method Pt Position [facility]   12/05/22 1420 94 Monitor 92 % At rest Supplemental oxygen Nasal cannula 2 L/min 129/58 Right upper arm Automatic Lying CK   12/05/22 1431 122 Monitor 90 % -- p activity Supplemental oxygen Nasal cannula 2 L/min 126/57 Right upper arm Automatic Lying CK   12/05/22 1432 -- -- 96 % -- p 1 min rest supine Supplemental oxygen Nasal cannula 2 L/min -- -- -- -- CK      OT Pain    Date/Time Pain Type Rating: Rest Rating: Activity [facility]   12/05/22 1420 Pain Assessment 0 0 CK          Prior Living Environment    Flowsheet Row Most Recent Value   People in Home child(gricelda), adult   Current Living Arrangements home   Living Environment Comment lives c dtr and her family in 2SH c stairglide to 2nd flr, 4 JEAN PAUL        Prior Level of Function    Flowsheet Row Most Recent Value   Ambulation  assistive equipment   Transferring assistive equipment   Toileting assistive equipment   Bathing independent   Dressing independent   Eating independent   Communication understands/communicates without difficulty   Prior Level of Function Comment per pt she was IND in amb and ADL c rollator vs SPC, raised toilet, dtr assists c IADL and drives to her appointments   Assistive Device Currently Used at Home commode, 3-in-1, stair glide, grab bar, cane, straight  [rollator]        Occupational Profile    Flowsheet Row Most Recent Value   Reason for Services/Referral Admitted with acute bronchitis, PNA   Occupational History/Life Experiences Retired    Environmental Supports and Barriers Lives with dgtr and her family           OT Evaluation and Treatment - 12/05/22 1419        OT Time Calculation    Start Time 1419     Stop Time 1434     Time Calculation (min) 15 min        Session Details    Document Type initial evaluation     Mode of Treatment occupational therapy        General Information    Patient Profile Reviewed yes     Onset of Illness/Injury or Date of Surgery 12/02/22     Referring Physician Arian     Patient/Family/Caregiver Comments/Observations RN cleared     General Observations of Patient Pt rec'd in bed     Existing Precautions/Restrictions fall;oxygen therapy device and L/min        Cognition/Psychosocial    Affect/Mental Status (Cognition) WFL     Orientation Status (Cognition) oriented x 4     Follows Commands (Cognition) WFL     Cognitive Function WFL     Comment, Cognition Pleasant and cooperative        Hearing Assessment    Hearing Status hearing impairment, bilaterally        Vision Assessment/Intervention    Visual Impairment/Limitations corrective lenses for reading        Sensory Assessment    Sensory Assessment (Somatosensory) UE sensation intact        Range of Motion (ROM)    Range of Motion ROM is WFL;bilateral upper extremities        Strength (Manual Muscle  Testing)    Strength (Manual Muscle Testing) strength is WFL;bilateral upper extremities        Bed Mobility    [place], Supine to Sit supervision     [place], Sit to Supine supervision     Assistive Device head of bed elevated;bed rails     Comment (Bed Mobility) OOB to R, moderate use of bed rails        Transfers    Transfers other (see comments)     Comment Pt ambulated to and from bathroom at close S level with SPC, no overt LOB, see PT note for details        Sit to Stand Transfer    [place], Sit to Stand Transfer minimum assist (75% or more patient effort)     Verbal Cues safety;technique     Assistive Device cane, straight     Comment Effortful to rise requiring multiple attempts        Stand to Sit Transfer    [place], Stand to Sit Transfer minimum assist (75% or more patient effort)     Verbal Cues hand placement;safety;technique     Assistive Device cane, straight        Toilet Transfer    Transfer Technique sit-stand;stand-sit     [place], Toilet Transfer close supervision     Verbal Cues hand placement;safety;technique     Assistive Device grab bars/safety frame;cane, straight     Comment Poor controlled descent onto toilet; pt reports she uses raised toilet seat at baseline        Safety Issues, Functional Mobility    Impairments Affecting Function (Mobility) balance;endurance/activity tolerance;shortness of breath;strength        Balance    Static Sitting Balance WFL     Dynamic Sitting Balance mild impairment     Static Standing Balance mild impairment     Dynamic Standing Balance mild impairment        Motor Skills    Functional Endurance Fair        Grooming    Self-Performance washes, rinses and dries hands     [place] close supervision     Position sink side     Comment Fair balance and functional reach        Toileting    Comment Pt reports completing prior to session        AM-PAC (TM) - ADL (Current Function)    Putting on and taking off regular lower body  clothing? 2 - A Lot     Bathing? 3 - A Little     Toileting? 2 - A Lot     Putting on/taking off regular upper body clothing? 3 - A Little     How much help for taking care of personal grooming? 3 - A Little     Eating meals? 4 - None     AM-PAC (TM) ADL Score 17        Assessment/Plan (OT)    Daily Outcome Statement OT eval complete. Pt presents from home with family with bronchitis/PNA with impairments in balance, strength, endurance and mobility. Pt req supervision for bed mobility, min A for transfers/mobility with SPC and close S for sinkside grooming task. Cont OT POC, rec SNF     Rehab Potential good, to achieve stated therapy goals     Therapy Frequency 3 times/wk     Planned Therapy Interventions activity tolerance training;adaptive equipment training;BADL retraining;functional balance retraining;occupation/activity based interventions;transfer/mobility retraining               OT Assessment/Plan    Flowsheet Row Most Recent Value   OT Recommended Discharge Disposition skilled nursing facility at 12/05/2022 1419   Anticipated Equipment Needs At Discharge (OT) other (see comments)  [TBD pending progress] at 12/05/2022 1419   Patient/Family Therapy Goal Statement to go to rehab at 12/05/2022 1419                    Education Documentation  Self-Care, taught by Yu Forte OT at 12/5/2022  3:58 PM.  Learner: Patient  Readiness: Acceptance  Method: Explanation  Response: Verbalizes Understanding  Comment: OT role/POC, safety with transfers/ADLs, call bell use          OT Goals    Flowsheet Row Most Recent Value   Transfer Goal 1    Activity/Assistive Device all transfers at 12/05/2022 1419   [place] modified independence at 12/05/2022 1419   Time Frame by discharge at 12/05/2022 1419   Progress/Outcome goal ongoing at 12/05/2022 1419   Dressing Goal 1    Activity/Adaptive Equipment dressing skills, all at 12/05/2022 1419   [place] modified independence at 12/05/2022 1419   Time Frame by discharge  at 12/05/2022 1419   Progress/Outcome goal ongoing at 12/05/2022 1419   Toileting Goal 1    Activity/Assistive Device toileting skills, all at 12/05/2022 1419   [place] modified independence at 12/05/2022 1419   Time Frame by discharge at 12/05/2022 1419   Progress/Outcome goal ongoing at 12/05/2022 1419

## 2022-12-05 NOTE — PATIENT CARE CONFERENCE
Care Progression Rounds Note  Date: 12/5/2022  Time: 11:53 AM     Patient Name: Merced Robin     Medical Record Number: [id number]   YOB: 1931  Sex: Female      Room/Bed: 0330    Admitting Diagnosis: Hypokalemia [E87.6]  Hypomagnesemia [E83.42]  Pneumonia due to infectious organism, unspecified laterality, unspecified part of lung [J18.9]   Admit Date/Time: 12/2/2022  6:01 PM    Primary Diagnosis: Acute bacterial bronchitis  Principal Problem: Acute bacterial bronchitis    GMLOS: pending  Anticipated Discharge Date: 12/6/2022    AM-PAC:  Mobility Score:      Discharge Planning:  Concerns to be Addressed: discharge planning  Anticipated Discharge Disposition: skilled nursing facility    Barriers to Discharge:  None    Comments:  iv abx, 2L O2    Participants:  pharmacy, physical therapy, physician, nursing

## 2022-12-06 LAB
ANION GAP SERPL CALC-SCNC: 8 MEQ/L (ref 3–15)
BUN SERPL-MCNC: 15 MG/DL (ref 8–20)
CALCIUM SERPL-MCNC: 8.2 MG/DL (ref 8.9–10.3)
CHLORIDE SERPL-SCNC: 104 MEQ/L (ref 98–109)
CO2 SERPL-SCNC: 26 MEQ/L (ref 22–32)
CREAT SERPL-MCNC: 0.5 MG/DL (ref 0.6–1.1)
ERYTHROCYTE [DISTWIDTH] IN BLOOD BY AUTOMATED COUNT: 12.6 % (ref 11.7–14.4)
GFR SERPL CREATININE-BSD FRML MDRD: >60 ML/MIN/1.73M*2
GLUCOSE SERPL-MCNC: 115 MG/DL (ref 70–99)
HCT VFR BLDCO AUTO: 29.4 % (ref 35–45)
HGB BLD-MCNC: 9.8 G/DL (ref 11.8–15.7)
MCH RBC QN AUTO: 29.9 PG (ref 28–33.2)
MCHC RBC AUTO-ENTMCNC: 33.3 G/DL (ref 32.2–35.5)
MCV RBC AUTO: 89.6 FL (ref 83–98)
MICROORGANISM SPEC CULT: NORMAL
PDW BLD AUTO: 9.3 FL (ref 9.4–12.3)
PLATELET # BLD AUTO: 311 K/UL (ref 150–369)
POTASSIUM SERPL-SCNC: 3.7 MEQ/L (ref 3.6–5.1)
RBC # BLD AUTO: 3.28 M/UL (ref 3.93–5.22)
SODIUM SERPL-SCNC: 138 MEQ/L (ref 136–144)
WBC # BLD AUTO: 9.53 K/UL (ref 3.8–10.5)

## 2022-12-06 PROCEDURE — 99233 SBSQ HOSP IP/OBS HIGH 50: CPT | Performed by: HOSPITALIST

## 2022-12-06 PROCEDURE — 25800000 HC PHARMACY IV SOLUTIONS: Performed by: INTERNAL MEDICINE

## 2022-12-06 PROCEDURE — 80048 BASIC METABOLIC PNL TOTAL CA: CPT

## 2022-12-06 PROCEDURE — 63700000 HC SELF-ADMINISTRABLE DRUG: Performed by: INTERNAL MEDICINE

## 2022-12-06 PROCEDURE — 85027 COMPLETE CBC AUTOMATED: CPT

## 2022-12-06 PROCEDURE — 20600000 HC ROOM AND CARE INTERMEDIATE/TELEMETRY

## 2022-12-06 PROCEDURE — 36415 COLL VENOUS BLD VENIPUNCTURE: CPT

## 2022-12-06 PROCEDURE — 63600000 HC DRUGS/DETAIL CODE: Performed by: INTERNAL MEDICINE

## 2022-12-06 RX ADMIN — AZITHROMYCIN MONOHYDRATE 500 MG: 500 INJECTION, POWDER, LYOPHILIZED, FOR SOLUTION INTRAVENOUS at 21:19

## 2022-12-06 RX ADMIN — ENOXAPARIN SODIUM 30 MG: 40 INJECTION SUBCUTANEOUS at 17:52

## 2022-12-06 RX ADMIN — Medication 100 MCG: at 09:19

## 2022-12-06 RX ADMIN — CETIRIZINE HYDROCHLORIDE 10 MG: 10 TABLET, FILM COATED ORAL at 09:19

## 2022-12-06 RX ADMIN — PANTOPRAZOLE SODIUM 40 MG: 40 TABLET, DELAYED RELEASE ORAL at 09:19

## 2022-12-06 RX ADMIN — LOSARTAN POTASSIUM 50 MG: 50 TABLET, FILM COATED ORAL at 09:19

## 2022-12-06 RX ADMIN — PAROXETINE 20 MG: 20 TABLET, FILM COATED ORAL at 09:19

## 2022-12-06 RX ADMIN — MOMETASONE FUROATE AND FORMOTEROL FUMARATE DIHYDRATE 2 PUFF: 100; 5 AEROSOL RESPIRATORY (INHALATION) at 05:41

## 2022-12-06 RX ADMIN — ROSUVASTATIN CALCIUM 10 MG: 10 TABLET, FILM COATED ORAL at 09:19

## 2022-12-06 RX ADMIN — AMLODIPINE BESYLATE 5 MG: 5 TABLET ORAL at 09:20

## 2022-12-06 RX ADMIN — CEFTRIAXONE SODIUM 1 G: 1 INJECTION, POWDER, FOR SOLUTION INTRAMUSCULAR; INTRAVENOUS at 20:48

## 2022-12-06 RX ADMIN — LEVOTHYROXINE SODIUM 75 MCG: 75 TABLET ORAL at 05:41

## 2022-12-06 RX ADMIN — MOMETASONE FUROATE AND FORMOTEROL FUMARATE DIHYDRATE 2 PUFF: 100; 5 AEROSOL RESPIRATORY (INHALATION) at 17:52

## 2022-12-06 NOTE — PLAN OF CARE
Problem: Adult Inpatient Plan of Care  Goal: Plan of Care Review  Flowsheets (Taken 12/6/2022 9540)  Plan of Care Reviewed With: daughter    ISABEL spoke with patient's daughter to discuss DCP. PT/OT recommended SNF upon discharge. Patient's daughter informed ISABEL her first choice placement is [facility] since the patient was previously there this year. Patient's daughter also requested referrals sent to [facility]. ISABEL emailed the patient's daughter the financial application for [facility] from [facility] followed up on referral with BM. We are awaiting approval of financial application. ISABEL reviewed additional referrals with patient's daughter. [facility] through [facility] stated they can accept patient tomorrow. ISABEL updated patient's daughter, informing her of the status on snf's. ISABEL will continue to follow.     Dispo:   SNF   -Pending financial application review

## 2022-12-06 NOTE — PATIENT CARE CONFERENCE
Care Progression Rounds Note  Date: 12/6/2022  Time: 10:45 AM     Patient Name: Merced Robin     Medical Record Number: [id number]   YOB: 1931  Sex: Female      Room/Bed: 0330    Admitting Diagnosis: Hypokalemia [E87.6]  Hypomagnesemia [E83.42]  Pneumonia due to infectious organism, unspecified laterality, unspecified part of lung [J18.9]   Admit Date/Time: 12/2/2022  6:01 PM    Primary Diagnosis: Acute bacterial bronchitis  Principal Problem: Acute bacterial bronchitis    GMLOS: 3.1  Anticipated Discharge Date: 12/6/2022    AM-PAC:  Mobility Score: 19    Discharge Planning:  Current Living Arrangements: home  Concerns to be Addressed: discharge planning  Anticipated Discharge Disposition: skilled nursing facility    Barriers to Discharge:  None    Comments:  oral abx, 2L O2    Participants:  pharmacy, physical therapy, physician, social work/services, nursing

## 2022-12-06 NOTE — PROGRESS NOTES
Pt referred to [facility] for SNF by [facility] Alicia.  Preferences provided and placed in [facility] to:  [facility]- Per [facility] an application is on file there, suggested family call there and discuss status and if updated financials are needed  [place]  [facility]    Anticipated dispo: SNF    [facility] following.

## 2022-12-06 NOTE — PLAN OF CARE
Plan of Care Review  Plan of Care Reviewed With: patient  Progress: no change  Outcome Summary: slept overnight. no complaints voiced. safety goals met.

## 2022-12-06 NOTE — PROGRESS NOTES
Hospital Medicine Service -  Daily Progress Note       SUBJECTIVE   Interval History: Patient seen and examined this morning. No acute events overnight. Patient OOB to chair; remains on 2LO2 NC; SpO2% > 92%. She denies chest pain and any worsening shortness of breath any worsening cough. She remains afebrile and hemodynamically stable.    OBJECTIVE      Vital signs in last 24 hours:  Temp:  [36.5 °C (97.7 °F)-37 °C (98.6 °F)] 37 °C (98.6 °F)  Heart Rate:  [] 97  Resp:  [16-19] 16  BP: (126-151)/(57-70) 133/63  No intake or output data in the 24 hours ending 12/06/22 1144    PHYSICAL EXAMINATION      Physical Exam  Vitals reviewed.   Constitutional:       General: She is not in acute distress.     Appearance: She is ill-appearing. She is not diaphoretic.   HENT:      Nose: Congestion present.   Cardiovascular:      Rate and Rhythm: Normal rate and regular rhythm.      Pulses: Normal pulses.      Heart sounds: Normal heart sounds, S1 normal and S2 normal. No murmur heard.  Pulmonary:      Effort: Pulmonary effort is normal.      Comments: Slight coarse crackles right > left   Loose non-productive cough   Abdominal:      Tenderness: There is no abdominal tenderness.   Musculoskeletal:      Right lower leg: No edema.      Left lower leg: No edema.   Skin:     General: Skin is warm.      Comments: Dry, frail skin    Neurological:      General: No focal deficit present.      Mental Status: She is alert and oriented to person, place, and time.            LINES, CATHETERS, DRAINS, AIRWAYS, AND WOUNDS   Lines, Drains, and Airways:  Wounds (agree with documentation and present on admission):  Peripheral IV (Adult) 12/02/22 Anterior;Right Forearm (Active)   Number of days: 4     None    Comments:      LABS / IMAGING / TELE      Labs   Results from last 7 days   Lab Units 12/04/22  0344 12/03/22  0511 12/02/22  1811   MAGNESIUM mg/dL 2.2 2.0 1.2*      Results from last 7 days   Lab Units 12/06/22  0358 12/05/22  0353  12/04/22  0344 12/03/22  0511 12/02/22  1808   SODIUM mEQ/L 138 136 133*   < > 132*   POTASSIUM mEQ/L 3.7 3.3* 3.8   < > 3.1*   CHLORIDE mEQ/L 104 100 98   < > 93*   CO2 mEQ/L 26 27 28   < > 27   BUN mg/dL 15 16 11   < > 15   CREATININE mg/dL 0.5* 0.6 0.5*   < > 0.7   CALCIUM mg/dL 8.2* 8.2* 7.8*   < > 9.1   ALBUMIN g/dL  --   --   --   --  3.8   BILIRUBIN TOTAL mg/dL  --   --   --   --  1.8*   ALK PHOS IU/L  --   --   --   --  88   ALT IU/L  --   --   --   --  24   AST IU/L  --   --   --   --  32   GLUCOSE mg/dL 115* 112* 109*   < > 135*    < > = values in this interval not displayed.      Results from last 7 days   Lab Units 12/06/22  0358 12/04/22 0344 12/02/22  1808   WBC K/uL 9.53 9.55 9.37   HEMOGLOBIN g/dL 9.8* 10.0* 10.9*   HEMATOCRIT % 29.4* 29.8* 31.8*   PLATELETS K/uL 311 258 237   DIFF TYPE   --   --  Manu   NEUTROS PCT MAN %  --   --  71   LYMPHO PCT MAN %  --   --  18   MONO PCT MAN %  --   --  5   EOSINO PCT MAN %  --   --  0   BASOS PCT MAN %  --   --  0   BANDS PCT MAN %  --   --  4   SEGS ABS MAN K/uL  --   --  6.65   LYMPHO ABS MAN K/uL  --   --  1.69   MONO ABS MAN K/uL  --   --  0.47   EOS ABS MAN K/uL  --   --  0.00*   BASOS ABS MAN K/uL  --   --  0.00*       SARS-CoV-2 (COVID-19) (no units)   Date/Time Value   12/02/2022 1807 Negative       Imaging  I have independently reviewed the pertinent imaging from the last 24 hrs.    ECG/Telemetry  I have independently reviewed the telemetry. No events for the last 24 hours.    ASSESSMENT AND PLAN      * Acute bacterial bronchitis  Assessment & Plan  Presents with cough shortness of breath and found to be hypoxic; CXR not impressive for acute infiltrate   Negative RSV/flu/COVID  Received ceftriaxone and azithromycin in the ER for concerns for community-acquired pneumonia vs brochitis  Sputum culture sent: preliminary result young growth to date.   PT/OT evaluation appreciated --> recommending dispo to SNF for rehabilitation. Patient and daughter in  agreement.     12/6: Patient remains on 2LO2 NC; SpO2 maintained 93-94% on 2LO2NC. She reports improvement in her cough and denies any worsening SOB on exam. She remains afebrile and hemodynamically stable.     Plan-   - Continue IV Rocephin and IV Azithromycin while inpatient; plan to transition to PO at discharge to complete 7-day course of treatment.  - Continue supportive care with mucolytics and oxygen  - Continue with ICS/laba given bronchospasm and asthma history, prn albuterol  - Follow up with sputum culture  - PT/OT evaluation for dispo planning     Hyperlipidemia  Assessment & Plan  Continue statin    GERD (gastroesophageal reflux disease)  Assessment & Plan  Continue PPI    Primary hypertension  Assessment & Plan  BP's appear stable.  On multiple antihypertensive agents at home    Plan-   - Continue amlodipine along with losartan  - Hold chlorthalidone given hyponatremia at this time and restart as needed    Hypokalemia  Assessment & Plan  On admission, K+ 3.1; received replacement on admission  With mild hypokalemia    12/6: K+ 3.7; PRN K+ ordered for replacement.     Plan-   - Repeat BMP in AM      Other specified hypothyroidism  Assessment & Plan  Continue levothyroxine       VTE Assessment: Padua VTE Score: 5  VTE Prophylaxis:  Current anticoagulants:  enoxaparin (LOVENOX) syringe 30 mg, subcutaneous, Daily (6p)      Code Status: DNR (A.N.D.)      Estimated Discharge Date: 12/6/2022       Disposition Planning: Medically stable for discharge; awaiting for SNF availability      FABIAN Cobos  12/6/2022

## 2022-12-06 NOTE — DISCHARGE SUMMARY
Hospital Medicine Service -  Inpatient Discharge Summary        BRIEF OVERVIEW   Admitting Provider: Michel Mccrary MD  Attending Provider: Soniya Don DO Attending phys phone: [phone]    PCP: Chica Hardy [phone]    Admission Date: 12/2/2022  Discharge Date: 12/7/2022     DISCHARGE DIAGNOSES      Primary Discharge Diagnosis  Acute bacterial bronchitis    Secondary Discharge Diagnoses  Active Hospital Problems    Diagnosis Date Noted   • Acute bacterial bronchitis 12/02/2022     Priority: Medium   • GERD (gastroesophageal reflux disease) 02/20/2022   • Hyperlipidemia 02/20/2022   • Primary hypertension 02/19/2022   • Hypokalemia 02/19/2022   • Other specified hypothyroidism 08/18/2021      Resolved Hospital Problems   No resolved problems to display.       Problem List on Day of Discharge  * Acute bacterial bronchitis  Assessment & Plan  Presents with cough shortness of breath and found to be hypoxic; CXR not impressive for acute infiltrate   Negative RSV/flu/COVID  Received ceftriaxone and azithromycin in the ER for concerns for community-acquired pneumonia vs brochitis  Sputum culture sent: normal leticia.   PT/OT evaluation appreciated --> recommending dispo to SNF for rehabilitation. Patient and daughter in agreement.     12/7: Patient remains on 2LO2 NC; SpO2 maintained 93-94% on 2LO2NC. She reports improvement in her cough and denies any worsening SOB on exam. She remains afebrile and hemodynamically stable.     Plan-   - Transition to PO Azithromycin and Ceftin at discharge to complete total of 7-day course of treatment.  - Continue supportive care with mucolytics and oxygen  - Continue with ICS/laba given bronchospasm and asthma history, prn albuterol  - Stable for discharge to SNF - [facility]     Hyperlipidemia  Assessment & Plan  Continue statin    GERD (gastroesophageal reflux disease)  Assessment & Plan  Continue PPI    Primary hypertension  Assessment & Plan  BP's appear  stable.  On multiple antihypertensive agents at home    Plan-   - Continue amlodipine along with losartan  - Stable for discharge     Hypokalemia  Assessment & Plan  On admission, K+ 3.1; received replacement on admission  With mild hypokalemia    12/7: K+ 3.8; PRN K+ ordered for replacement.     Plan-   - Resolved; stable for discharge       Other specified hypothyroidism  Assessment & Plan  Continue levothyroxine    SUMMARY OF HOSPITALIZATION      Presenting Problem/History of Present Illness  This is a 89 y.o. year-old female admitted on 12/2/2022 with Hypokalemia [E87.6]  Hypomagnesemia [E83.42]  Pneumonia due to infectious organism, unspecified laterality, unspecified part of lung [J18.9].    Patient presents with a past medical history of breast cancer, hypertension, hypothyroidism with other conditions as below comes with above symptoms.     Patient states that since Tuesday she started having sore throat and nasal congestion along with cough.  This continued to worsen through the day and has been having greenish discharge.  Without any blood or brownish discoloration of the phlegm.  She also has been having some fevers and chills.  States that she has some difficulty breathing but was not bothering her much.  She denies any chest pain.  States that her grandson who is in eighth grade is sick with similar symptoms as well. Denies any abdominal pain, nausea, vomiting, diarrhea or constipation, lightheadedness palpitations, any change or new urinary symptoms.  Endorses chronic ptosis of the left eye.    Hospital Course    The patient was admitted to the telemetry unit for further evaluation.    The patient was treated for suspect pneumonia during admission. Patient received IV Rocephin and IV Azithromycin. Sputum cultures were sent on admission and final results indicated normal leticia. Patient is maintaining SpO2% > 92% on 2LO2NC on the day of discharge.     The patient was evaluated by the physical and  occupational therapist during admission. Upon further evaluation, PT/OT recommends disposition to skilled nursing facility to continue further rehabilitation. Patient and her daughter Leta in agreement to discharge.       On the day of discharge, the patient denies chest pain, any worsening shortness of breath, dizziness, lightheadedness, and palpitations. The patient denies any headaches, changes in vision, nausea, or vomiting. She is tolerating a cardiac diet without difficulty. The patient remains afebrile and hemodynamically stable. The patient was instructed to follow up with their primary care physician within a week of discharge from the hospital.    MD-report called and provided to Dr. Mcgrath at [facility]. Patient's daughter Leta also called and updated on plan of care and discharge instructions.       Exam on Day of Discharge  Physical Exam  Vitals reviewed.   Constitutional:       General: She is not in acute distress.     Appearance: She is ill-appearing. She is not diaphoretic.   HENT:      Nose: Congestion present.   Cardiovascular:      Rate and Rhythm: Normal rate and regular rhythm.      Pulses: Normal pulses.      Heart sounds: Normal heart sounds, S1 normal and S2 normal. No murmur heard.  Pulmonary:      Effort: Pulmonary effort is normal.      Breath sounds: No wheezing.      Comments: Slight coarse crackles noted bilaterally  Loose non-productive cough   Abdominal:      Palpations: Abdomen is soft.      Tenderness: There is no abdominal tenderness.   Musculoskeletal:      Right lower leg: No edema.      Left lower leg: No edema.   Skin:     General: Skin is dry.   Neurological:      General: No focal deficit present.      Mental Status: She is alert and oriented to person, place, and time.         Consults During Admission  None    DISCHARGE MEDICATIONS               Medication List      START taking these medications    azithromycin 500 mg tablet  Commonly known as:  ZITHROMAX  Take 1 tablet (500 mg total) by mouth daily for 2 days.  Dose: 500 mg     cefuroxime 500 mg tablet  Commonly known as: CEFTIN  Take 1 tablet (500 mg total) by mouth 2 (two) times a day for 2 days. To complete a 7-day day course of treatment  Dose: 500 mg        CONTINUE taking these medications    amLODIPine 5 mg tablet  Commonly known as: NORVASC  Take 5 mg by mouth daily.  Dose: 5 mg     calcium citrate-vitamin D3 315 mg-5 mcg (200 unit) per tablet  Commonly known as: CITRACAL+D  Take 1 tablet by mouth once daily.  Dose: 1 tablet     cetirizine 10 mg tablet  Commonly known as: ZyrTEC  Take 10 mg by mouth daily.  Dose: 10 mg     chlorthalidone 50 mg tablet  Commonly known as: HYGROTON  Take 25 mg by mouth daily.  Dose: 25 mg     cyanocobalamin 100 mcg tablet  Commonly known as: VITAMIN B12  Take 100 mcg by mouth daily.  Dose: 100 mcg     DAILY MULTIVITAMIN-MINERALS tablet  Take 1 tablet by mouth daily.  Dose: 1 tablet  Generic drug: multivitamin with minerals     ipratropium-albuteroL 0.5-2.5 mg/3 mL nebulizer solution  Commonly known as: DUO-NEB  Take 3 mL by nebulization every 6 (six) hours.  Dose: 3 mL     lansoprazole 30 mg disintegrating tablet  Commonly known as: PREVACID SOLUTAB  Take 30 mg by mouth daily before breakfast.  Dose: 30 mg     levothyroxine 75 mcg tablet  Commonly known as: SYNTHROID  Take 75 mcg by mouth daily.  Dose: 75 mcg     losartan 50 mg tablet  Commonly known as: COZAAR  Take 50 mg by mouth daily.  Dose: 50 mg     PARoxetine 20 mg tablet  Commonly known as: PAXIL  Take 20 mg by mouth daily.  Dose: 20 mg     rosuvastatin 10 mg tablet  Commonly known as: CRESTOR  Take 10 mg by mouth daily.  Dose: 10 mg             Instructions for after discharge     Discharge diet      Diet Type / Texture: Regular    Follow Up:  With Primary MD within 1 week discharge from facility      - Follow up with PCP in 1 week for hospital follow up appointment             PROCEDURES / LABS / IMAGING       Operative Procedures  none    Other Procedures  none    Pertinent Labs   Results from last 7 days   Lab Units 12/04/22  0344 12/03/22  0511 12/02/22  1811   MAGNESIUM mg/dL 2.2 2.0 1.2*      Results from last 7 days   Lab Units 12/07/22 0437 12/06/22 0358 12/05/22  0353 12/03/22  0511 12/02/22  1808   SODIUM mEQ/L 138 138 136   < > 132*   POTASSIUM mEQ/L 3.8 3.7 3.3*   < > 3.1*   CHLORIDE mEQ/L 99 104 100   < > 93*   CO2 mEQ/L 29 26 27   < > 27   BUN mg/dL 15 15 16   < > 15   CREATININE mg/dL 0.5* 0.5* 0.6   < > 0.7   CALCIUM mg/dL 8.5* 8.2* 8.2*   < > 9.1   ALBUMIN g/dL  --   --   --   --  3.8   BILIRUBIN TOTAL mg/dL  --   --   --   --  1.8*   ALK PHOS IU/L  --   --   --   --  88   ALT IU/L  --   --   --   --  24   AST IU/L  --   --   --   --  32   GLUCOSE mg/dL 122* 115* 112*   < > 135*    < > = values in this interval not displayed.      Results from last 7 days   Lab Units 12/07/22 0437 12/06/22 0358 12/04/22 0344 12/02/22  1808   WBC K/uL 10.15 9.53 9.55 9.37   HEMOGLOBIN g/dL 9.9* 9.8* 10.0* 10.9*   HEMATOCRIT % 29.8* 29.4* 29.8* 31.8*   PLATELETS K/uL 368 311 258 237   DIFF TYPE  Manu  --   --  Manu   NEUTROS PCT MAN % 76  --   --  71   LYMPHO PCT MAN % 12  --   --  18   MONO PCT MAN % 6  --   --  5   EOSINO PCT MAN % 1  --   --  0   BASOS PCT MAN % 0  --   --  0   BANDS PCT MAN %  --   --   --  4   SEGS ABS MAN K/uL 7.71*  --   --  6.65   LYMPHO ABS MAN K/uL 1.22  --   --  1.69   MONO ABS MAN K/uL 0.61  --   --  0.47   EOS ABS MAN K/uL 0.10  --   --  0.00*   BASOS ABS MAN K/uL 0.00*  --   --  0.00*   MYELOCYTES, ABSOLUTE (MANUAL) K/uL 0.20*  --   --   --        SARS-CoV-2 (COVID-19) (no units)   Date/Time Value   12/02/2022 1807 Negative       Pertinent Imaging  X-RAY CHEST 1 VIEW    Result Date: 12/2/2022  IMPRESSION: No acute cardiopulmonary disease.      OUTPATIENT  FOLLOW-UP / REFERRALS / PENDING TESTS        Outpatient Follow-Up Appointments  Encounter Information    This patient does not  currently have any appointments scheduled.         Referrals  No orders of the defined types were placed in this encounter.      Test Results Pending at Discharge  Unresulted Labs (From admission, onward)    None          Important Issues to Address in Follow-Up  - Follow up with PCP in 1 week for hospital follow up appointment    DISCHARGE DISPOSITION AND DESTINATION      Disposition: Skilled Nursing Facility - Other   Destination:  Skilled Nursing Facility - [facility]                            Code Status At Discharge: DNR (A.N.D.)    Physician Order for Life-Sustaining Treatment Document Status      No documents found

## 2022-12-06 NOTE — DISCHARGE INSTRUCTIONS
You are being discharged home after being evaluated for worsening cough. You were started on antibiotics for treatment of community acquired pneumonia.       Please follow up with your PCP in 1 week for hospital follow-up appointment.      Please note: You are being prescribed CEFTIN for antibiotic treatment of community acquired pneumonia. Take medication as directed. Please read medication side effects included in your discharge instructions.       Please note: You are being prescribed Azithromycin for antibiotic treatment of community acquired pneumonia.  Take medication as directed. Please read medication side effects included in your discharge instructions.

## 2022-12-07 VITALS
BODY MASS INDEX: 21.33 KG/M2 | DIASTOLIC BLOOD PRESSURE: 69 MMHG | SYSTOLIC BLOOD PRESSURE: 149 MMHG | OXYGEN SATURATION: 93 % | TEMPERATURE: 98.4 F | HEIGHT: 65 IN | WEIGHT: 128 LBS | RESPIRATION RATE: 16 BRPM | HEART RATE: 85 BPM

## 2022-12-07 LAB
AGRAN PLATELETS BLD QL SMEAR: ABNORMAL
ANION GAP SERPL CALC-SCNC: 10 MEQ/L (ref 3–15)
BACTERIA BLD CULT: NORMAL
BACTERIA BLD CULT: NORMAL
BASOPHILS # BLD: 0 K/UL (ref 0.01–0.1)
BASOPHILS NFR BLD: 0 %
BUN SERPL-MCNC: 15 MG/DL (ref 8–20)
CALCIUM SERPL-MCNC: 8.5 MG/DL (ref 8.9–10.3)
CHLORIDE SERPL-SCNC: 99 MEQ/L (ref 98–109)
CO2 SERPL-SCNC: 29 MEQ/L (ref 22–32)
CREAT SERPL-MCNC: 0.5 MG/DL (ref 0.6–1.1)
DIFFERENTIAL METHOD BLD: ABNORMAL
DOHLE BOD BLD QL SMEAR: ABNORMAL
EOSINOPHIL # BLD: 0.1 K/UL (ref 0.04–0.36)
EOSINOPHIL NFR BLD: 1 %
ERYTHROCYTE [DISTWIDTH] IN BLOOD BY AUTOMATED COUNT: 12.8 % (ref 11.7–14.4)
GFR SERPL CREATININE-BSD FRML MDRD: >60 ML/MIN/1.73M*2
GIANT PLATELETS BLD QL SMEAR: ABNORMAL
GLUCOSE SERPL-MCNC: 122 MG/DL (ref 70–99)
HCT VFR BLDCO AUTO: 29.8 % (ref 35–45)
HGB BLD-MCNC: 9.9 G/DL (ref 11.8–15.7)
LYMPHOCYTES # BLD: 1.22 K/UL (ref 1.2–3.5)
LYMPHOCYTES NFR BLD: 12 %
MCH RBC QN AUTO: 29.7 PG (ref 28–33.2)
MCHC RBC AUTO-ENTMCNC: 33.2 G/DL (ref 32.2–35.5)
MCV RBC AUTO: 89.5 FL (ref 83–98)
MONOCYTES # BLD: 0.61 K/UL (ref 0.28–0.8)
MONOCYTES NFR BLD: 6 %
MYELOCYTES # BLD MANUAL: 0.2 K/UL
MYELOCYTES NFR BLD MANUAL: 2 %
NEUTS BAND # BLD: 7.71 K/UL (ref 1.7–7)
NEUTS SEG NFR BLD: 76 %
PDW BLD AUTO: 9.3 FL (ref 9.4–12.3)
PLATELET # BLD AUTO: 368 K/UL (ref 150–369)
PLATELET # BLD EST: ABNORMAL 10*3/UL
POTASSIUM SERPL-SCNC: 3.8 MEQ/L (ref 3.6–5.1)
RBC # BLD AUTO: 3.33 M/UL (ref 3.93–5.22)
RBC MORPH BLD: NORMAL
SARS-COV-2 RNA RESP QL NAA+PROBE: NEGATIVE
SODIUM SERPL-SCNC: 138 MEQ/L (ref 136–144)
VARIANT LYMPHS # BLD MANUAL: 0.3 K/UL
VARIANT LYMPHS NFR BLD: 3 %
WBC # BLD AUTO: 10.15 K/UL (ref 3.8–10.5)

## 2022-12-07 PROCEDURE — 85025 COMPLETE CBC W/AUTO DIFF WBC: CPT | Performed by: HOSPITALIST

## 2022-12-07 PROCEDURE — 99239 HOSP IP/OBS DSCHRG MGMT >30: CPT | Performed by: HOSPITALIST

## 2022-12-07 PROCEDURE — 36415 COLL VENOUS BLD VENIPUNCTURE: CPT | Performed by: HOSPITALIST

## 2022-12-07 PROCEDURE — 63600000 HC DRUGS/DETAIL CODE: Performed by: INTERNAL MEDICINE

## 2022-12-07 PROCEDURE — U0003 INFECTIOUS AGENT DETECTION BY NUCLEIC ACID (DNA OR RNA); SEVERE ACUTE RESPIRATORY SYNDROME CORONAVIRUS 2 (SARS-COV-2) (CORONAVIRUS DISEASE [COVID-19]), AMPLIFIED PROBE TECHNIQUE, MAKING USE OF HIGH THROUGHPUT TECHNOLOGIES AS DESCRIBED BY CMS-2020-01-R: HCPCS | Performed by: HOSPITALIST

## 2022-12-07 PROCEDURE — 80048 BASIC METABOLIC PNL TOTAL CA: CPT | Performed by: HOSPITALIST

## 2022-12-07 PROCEDURE — 63700000 HC SELF-ADMINISTRABLE DRUG: Performed by: INTERNAL MEDICINE

## 2022-12-07 RX ORDER — AZITHROMYCIN 500 MG/1
500 TABLET, FILM COATED ORAL DAILY
Qty: 2 TABLET | Refills: 0 | Status: SHIPPED | OUTPATIENT
Start: 2022-12-07 | End: 2022-12-09

## 2022-12-07 RX ORDER — CEFUROXIME AXETIL 500 MG/1
500 TABLET ORAL 2 TIMES DAILY
Qty: 4 TABLET | Refills: 0 | Status: SHIPPED | OUTPATIENT
Start: 2022-12-07 | End: 2022-12-09

## 2022-12-07 RX ADMIN — PAROXETINE 20 MG: 20 TABLET, FILM COATED ORAL at 10:11

## 2022-12-07 RX ADMIN — MOMETASONE FUROATE AND FORMOTEROL FUMARATE DIHYDRATE 2 PUFF: 100; 5 AEROSOL RESPIRATORY (INHALATION) at 17:06

## 2022-12-07 RX ADMIN — ROSUVASTATIN CALCIUM 10 MG: 10 TABLET, FILM COATED ORAL at 10:10

## 2022-12-07 RX ADMIN — AMLODIPINE BESYLATE 5 MG: 5 TABLET ORAL at 10:11

## 2022-12-07 RX ADMIN — PANTOPRAZOLE SODIUM 40 MG: 40 TABLET, DELAYED RELEASE ORAL at 10:10

## 2022-12-07 RX ADMIN — LOSARTAN POTASSIUM 50 MG: 50 TABLET, FILM COATED ORAL at 10:11

## 2022-12-07 RX ADMIN — LEVOTHYROXINE SODIUM 75 MCG: 75 TABLET ORAL at 06:04

## 2022-12-07 RX ADMIN — ENOXAPARIN SODIUM 30 MG: 40 INJECTION SUBCUTANEOUS at 17:05

## 2022-12-07 RX ADMIN — CETIRIZINE HYDROCHLORIDE 10 MG: 10 TABLET, FILM COATED ORAL at 10:11

## 2022-12-07 RX ADMIN — Medication 100 MCG: at 10:11

## 2022-12-07 RX ADMIN — MOMETASONE FUROATE AND FORMOTEROL FUMARATE DIHYDRATE 2 PUFF: 100; 5 AEROSOL RESPIRATORY (INHALATION) at 06:03

## 2022-12-07 NOTE — PATIENT CARE CONFERENCE
Care Progression Rounds Note  Date: 12/7/2022  Time: 1:46 PM     Patient Name: Merced Robin     Medical Record Number: [id number]   YOB: 1931  Sex: Female      Room/Bed: 0330    Admitting Diagnosis: Hypokalemia [E87.6]  Hypomagnesemia [E83.42]  Pneumonia due to infectious organism, unspecified laterality, unspecified part of lung [J18.9]   Admit Date/Time: 12/2/2022  6:01 PM    Primary Diagnosis: Acute bacterial bronchitis  Principal Problem: Acute bacterial bronchitis    GMLOS: 3.1  Anticipated Discharge Date: 12/6/2022    AM-PAC:  Mobility Score: 19    Discharge Planning:  Current Living Arrangements: home  Concerns to be Addressed: discharge planning  Anticipated Discharge Disposition: skilled nursing facility    Barriers to Discharge:  None    Comments:  d/c today-[facility] at 5pm    Participants:  pharmacy, physical therapy, physician, social work/services

## 2022-12-07 NOTE — PROGRESS NOTES
Per [facility] Alicia preferences are:   [facility] w/ [facility] and the financial barby and clnicals are still in review this date.  Next prefernce is [facility].  Spoke w/ [facility] and they can accept.  Confirmed d/c today today.  Dispo:  DC to [facility]   RN# [phone]  MD report: Dr. Mcgrath [phone]    Discharge Transportation Vendor: [facility] ([phone], option [phone])  Type of Transportation: basic life support  What day is the transport expected?: 12/07/22  What time is the transport expected?: 1700    [facility] following thru SNF transition.

## 2022-12-07 NOTE — PLAN OF CARE
Plan of Care Review  Plan of Care Reviewed With: patient  Progress: no change  Outcome Summary: Pt alert and oriented.  IV antibiotics per MAR.  VSS.  Fall precautions in place.

## 2022-12-07 NOTE — PLAN OF CARE
Problem: Adult Inpatient Plan of Care  Goal: Plan of Care Review  Flowsheets (Taken 12/7/2022 1410)  Plan of Care Reviewed With: (Treatment Care Team) --    Per IDR patient is medically stable for discharge. ISABEL informed patient's daughter-Leta the financial application is still under review. Leta informed ISABEL the second choice placement is [facility]. [facility] has a bed and can accept patient today.     ISABEL spoke with Marla from [facility] to coordinate DCP. The patient's transport to [facility] will be 5pm. SW alerted family of scheduled pickup time. Nurse to Nurse and MD to MD, number's are in the Treatment Team Sticky Note. OOH DNR completed. IM done. SW will continue to follow for DCP needs if they may arise.       Dispo:  [facility]  [facility] at 5pm

## 2024-12-11 PROCEDURE — 99306 1ST NF CARE HIGH MDM 50: CPT | Performed by: INTERNAL MEDICINE

## 2024-12-13 ENCOUNTER — LAB REQUISITION (OUTPATIENT)
Dept: LAB | Facility: HOSPITAL | Age: 89
End: 2024-12-13
Attending: INTERNAL MEDICINE
Payer: COMMERCIAL

## 2024-12-13 DIAGNOSIS — E78.5 HYPERLIPIDEMIA, UNSPECIFIED: ICD-10-CM

## 2024-12-13 DIAGNOSIS — M19.90 UNSPECIFIED OSTEOARTHRITIS, UNSPECIFIED SITE: ICD-10-CM

## 2024-12-13 DIAGNOSIS — K21.9 GASTRO-ESOPHAGEAL REFLUX DISEASE WITHOUT ESOPHAGITIS: ICD-10-CM

## 2024-12-13 DIAGNOSIS — I10 ESSENTIAL (PRIMARY) HYPERTENSION: ICD-10-CM

## 2024-12-13 LAB
ALBUMIN SERPL-MCNC: 3.4 G/DL (ref 3.5–5.7)
ALP SERPL-CCNC: 66 IU/L (ref 34–125)
ALT SERPL-CCNC: 11 IU/L (ref 7–52)
ANION GAP SERPL CALC-SCNC: 9 MEQ/L (ref 3–15)
AST SERPL-CCNC: 16 IU/L (ref 13–39)
BILIRUB SERPL-MCNC: 1.3 MG/DL (ref 0.3–1.2)
BUN SERPL-MCNC: 27 MG/DL (ref 7–25)
CALCIUM SERPL-MCNC: 8.9 MG/DL (ref 8.6–10.3)
CHLORIDE SERPL-SCNC: 98 MEQ/L (ref 98–107)
CO2 SERPL-SCNC: 31 MEQ/L (ref 21–31)
CREAT SERPL-MCNC: 0.6 MG/DL (ref 0.6–1.2)
EGFRCR SERPLBLD CKD-EPI 2021: >60 ML/MIN/1.73M*2
ERYTHROCYTE [DISTWIDTH] IN BLOOD BY AUTOMATED COUNT: 13.6 % (ref 11.7–14.4)
GLUCOSE SERPL-MCNC: 152 MG/DL (ref 70–99)
HCT VFR BLD AUTO: 33.4 % (ref 35–45)
HGB BLD-MCNC: 10.8 G/DL (ref 11.8–15.7)
MCH RBC QN AUTO: 29 PG (ref 28–33.2)
MCHC RBC AUTO-ENTMCNC: 32.3 G/DL (ref 32.2–35.5)
MCV RBC AUTO: 89.8 FL (ref 83–98)
PLATELET # BLD AUTO: 357 K/UL (ref 150–369)
PMV BLD AUTO: 9.3 FL (ref 9.4–12.3)
POTASSIUM SERPL-SCNC: 4 MEQ/L (ref 3.5–5.1)
PROT SERPL-MCNC: 5.8 G/DL (ref 6–8.2)
RBC # BLD AUTO: 3.72 M/UL (ref 3.93–5.22)
SODIUM SERPL-SCNC: 138 MEQ/L (ref 136–145)
WBC # BLD AUTO: 6.77 K/UL (ref 3.8–10.5)

## 2024-12-13 PROCEDURE — 85027 COMPLETE CBC AUTOMATED: CPT | Performed by: INTERNAL MEDICINE

## 2024-12-13 PROCEDURE — 80053 COMPREHEN METABOLIC PANEL: CPT | Performed by: INTERNAL MEDICINE

## 2024-12-13 PROCEDURE — 36415 COLL VENOUS BLD VENIPUNCTURE: CPT | Performed by: INTERNAL MEDICINE

## 2024-12-18 ENCOUNTER — LAB REQUISITION (OUTPATIENT)
Dept: LAB | Facility: HOSPITAL | Age: 89
End: 2024-12-18
Attending: INTERNAL MEDICINE
Payer: COMMERCIAL

## 2024-12-18 DIAGNOSIS — N39.0 URINARY TRACT INFECTION, SITE NOT SPECIFIED: ICD-10-CM

## 2024-12-18 LAB
BACTERIA URNS QL MICRO: 2 /HPF
BILIRUB UR QL STRIP.AUTO: NEGATIVE MG/DL
CLARITY UR REFRACT.AUTO: ABNORMAL
COLOR UR AUTO: YELLOW
GLUCOSE UR STRIP.AUTO-MCNC: NEGATIVE MG/DL
HGB UR QL STRIP.AUTO: 2
HYALINE CASTS #/AREA URNS LPF: ABNORMAL /LPF
KETONES UR STRIP.AUTO-MCNC: NEGATIVE MG/DL
LEUKOCYTE ESTERASE UR QL STRIP.AUTO: 3
MUCOUS THREADS URNS QL MICRO: ABNORMAL /LPF
NITRITE UR QL STRIP.AUTO: NEGATIVE
PH UR STRIP.AUTO: 6 [PH]
PROT UR QL STRIP.AUTO: 1
RBC #/AREA URNS HPF: ABNORMAL /HPF
SP GR UR REFRACT.AUTO: 1.02
SQUAMOUS URNS QL MICRO: ABNORMAL /HPF
UROBILINOGEN UR STRIP-ACNC: 0.2 EU/DL
WBC #/AREA URNS HPF: ABNORMAL /HPF

## 2024-12-18 PROCEDURE — 81001 URINALYSIS AUTO W/SCOPE: CPT | Performed by: INTERNAL MEDICINE

## 2024-12-18 PROCEDURE — 99309 SBSQ NF CARE MODERATE MDM 30: CPT | Performed by: INTERNAL MEDICINE

## 2024-12-18 PROCEDURE — 87086 URINE CULTURE/COLONY COUNT: CPT | Performed by: INTERNAL MEDICINE

## 2024-12-20 LAB
BACTERIA UR CULT: ABNORMAL
BACTERIA UR CULT: ABNORMAL

## 2025-01-02 PROCEDURE — 99308 SBSQ NF CARE LOW MDM 20: CPT | Performed by: STUDENT IN AN ORGANIZED HEALTH CARE EDUCATION/TRAINING PROGRAM

## 2025-01-06 PROCEDURE — 99307 SBSQ NF CARE SF MDM 10: CPT | Performed by: NURSE PRACTITIONER

## 2025-01-08 PROCEDURE — 99309 SBSQ NF CARE MODERATE MDM 30: CPT | Performed by: INTERNAL MEDICINE

## 2025-01-15 PROCEDURE — 99309 SBSQ NF CARE MODERATE MDM 30: CPT | Performed by: INTERNAL MEDICINE

## 2025-01-16 ENCOUNTER — LAB REQUISITION (OUTPATIENT)
Dept: LAB | Facility: HOSPITAL | Age: 89
End: 2025-01-16
Attending: INTERNAL MEDICINE
Payer: MEDICARE

## 2025-01-16 DIAGNOSIS — Z13.83 ENCOUNTER FOR SCREENING FOR RESPIRATORY DISORDER NEC: ICD-10-CM

## 2025-01-16 PROCEDURE — 87634 RSV DNA/RNA AMP PROBE: CPT | Performed by: INTERNAL MEDICINE

## 2025-01-17 LAB — RSV RNA SPEC QL NAA+PROBE: NEGATIVE

## 2025-03-16 ENCOUNTER — HOSPITAL ENCOUNTER (EMERGENCY)
Facility: HOSPITAL | Age: 89
Discharge: HOME | End: 2025-03-16
Attending: EMERGENCY MEDICINE | Admitting: EMERGENCY MEDICINE
Payer: MEDICARE

## 2025-03-16 ENCOUNTER — APPOINTMENT (EMERGENCY)
Dept: RADIOLOGY | Facility: HOSPITAL | Age: 89
End: 2025-03-16
Payer: MEDICARE

## 2025-03-16 VITALS
BODY MASS INDEX: 21.66 KG/M2 | RESPIRATION RATE: 18 BRPM | DIASTOLIC BLOOD PRESSURE: 90 MMHG | TEMPERATURE: 97.2 F | OXYGEN SATURATION: 94 % | WEIGHT: 130 LBS | HEART RATE: 95 BPM | HEIGHT: 65 IN | SYSTOLIC BLOOD PRESSURE: 140 MMHG

## 2025-03-16 DIAGNOSIS — R60.0 LEG EDEMA: Primary | ICD-10-CM

## 2025-03-16 PROCEDURE — 93971 EXTREMITY STUDY: CPT | Mod: LT

## 2025-03-16 PROCEDURE — 99284 EMERGENCY DEPT VISIT MOD MDM: CPT | Mod: 25

## 2025-03-16 ASSESSMENT — ENCOUNTER SYMPTOMS
SHORTNESS OF BREATH: 0
MYALGIAS: 1

## 2025-03-16 NOTE — ED PROVIDER NOTES
Emergency Medicine Note  HPI   HISTORY OF PRESENT ILLNESS     89 year old female presents to the ER w/ LLE swelling. Noticed a couple of days ago. Some mild discomfort. 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 No CP or SOB.           Patient History   PAST HISTORY     Reviewed from Nursing Triage:       Past Medical History:   Diagnosis Date    Breast cancer (CMS/HCC)     GERD (gastroesophageal reflux disease) 2/20/2022    Human metapneumovirus pneumonia 2/21/2022    Hyperlipidemia 2/20/2022    Hypertension     Hypothyroidism        Past Surgical History   Procedure Laterality Date    Breast lumpectomy Bilateral     Hip surgery Right     Replacement total knee Right     Shoulder surgery Bilateral        No family history on file.    Social History     Tobacco Use    Smoking status: Former    Smokeless tobacco: Never    Tobacco comments:     smoked ages [age]   Vaping Use    Vaping status: Never Used   Substance Use Topics    Alcohol use: Yes     Comment: rare    Drug use: Not Currently         Review of Systems   REVIEW OF SYSTEMS     Review of Systems   Respiratory:  Negative for shortness of breath.    Cardiovascular:  Positive for leg swelling. Negative for chest pain.   Musculoskeletal:  Positive for myalgias.         VITALS     ED Vitals      Date/Time Temp Pulse Resp BP SpO2 [facility]   03/16/25 1058 36.2 °C (97.2 °F) 95 18 140/90 94 % J(S          Pulse Ox %: 94 % (03/16/25 1131)  Pulse Ox Interpretation: Normal (03/16/25 1131)           Physical Exam   PHYSICAL EXAM     Physical Exam  Constitutional:       General: She is not in acute distress.     Appearance: Normal appearance. She is not ill-appearing, toxic-appearing or diaphoretic.   HENT:      Head: Normocephalic and atraumatic.   Cardiovascular:      Pulses:           Dorsalis pedis pulses are 2+ on the right side and 2+ on the left side.   Pulmonary:      Effort: Pulmonary effort is normal.   Musculoskeletal:      Left lower leg: Edema (slight LLE edema, no calf tenderness,  no erythema/callor) present.   Skin:     General: Skin is warm and dry.   Neurological:      General: No focal deficit present.      Mental Status: She is alert and oriented to person, place, and time.           PROCEDURES     Procedures     DATA     Results       None            Imaging Results              US venous leg left (Final result)  Result time 03/16/25 12:26:08      Final result                   Impression:    IMPRESSION:  No evidence of acute LEFT lower extremity deep venous thrombosis in the  visualized venous segments.    If the patient has persistent or worsening symptoms, repeat ultrasound in 5-7  days is recommended, or earlier if clinically indicated.               Narrative:    EXAM: US VENOUS LEG LEFT    CLINICAL HISTORY: Calf edema    COMPARISON: None    PROCEDURE:  Ultrasound examination of the left lower extremity deep venous  systems was performed using grayscale, color and spectral Doppler.    COMMENT: The common femoral, saphenofemoral junction, femoral and popliteal  veins demonstrate a normal response to compression maneuvers. There is also a  normal response to respiratory variation. The bifurcation of the common femoral  into the superficial and deep femoral veins is also visualized.  Flow is  identified in these vessels with no evidence of intraluminal thrombus on either  color Doppler or grayscale images.    The visualized portions of the proximal calf veins are also normal.    Spectral Doppler evaluation of the contralateral common femoral vein  demonstrates symmetric, spontaneous and phasic flow.    Subcutaneous edema.                                      No orders to display       Scoring tools                                  ED Course & MDM   MDM / ED COURSE / CLINICAL IMPRESSION / DISPO     Medical Decision Making  Problems Addressed:  Leg edema: acute illness or injury        ED Course as of 03/16/25 1804   Sun Mar 16, 2025   1200 No dvt on my prelim review of  us  Independently reviewed and interpreted by Eldon sheldon DO.  Waiting on official radiology read.   [KRISTINA]   1229 IMPRESSION:  No evidence of acute LEFT lower extremity deep venous thrombosis in the  visualized venous segments.     If the patient has persistent or worsening symptoms, repeat ultrasound in 5-7  days is recommended, or earlier if clinically indicated.   [AC]   1249 US negative for a DVT. Encouraged patient to wear compression stocking and elevate leg. No signs of cellulitis on exam. Recommend repeat US in 1 week if symptoms persist.  [AC]      ED Course User Index  [AC] Elisha Willard PA C  [KRISTINA] Eldon Casiano DO     Clinical Impression      Leg edema     _________________       ED Disposition   Discharge                       Elisha Willard PA C  03/16/25 [id number]

## 2025-03-16 NOTE — DISCHARGE INSTRUCTIONS
Elevate your leg often, wear compression socks. If symptoms persist or worse, obtain repeat ultrasound in 5-7 days

## 2025-03-16 NOTE — ED ATTESTATION NOTE
Physician Attestation:     I have personally seen and examined the patient, participated in the management, and agree with the findings in the above note except as where stated.      I have personally performed the key components of the encounter and provided a substantive portion of the care and medical decision making.    The Physician Assistant and I discussed  the case, workup, and disposition.        Chief Complaint  Chief Complaint   Patient presents with    Leg Swelling       My focused history, examination, assessment, and plan of care is as follows:      History  89 y.o. female with history of provoked DVTs after giving birth greater than 50 years ago presents for left lower extremity swelling noted by her daughter over the past few days.  Denies trauma.  No fever.    Hip replacement in December      Physical  Vital signs reviewed   Exam of the left lower extremity shows very slight swelling when compared to the contralateral side no pitting edema no bony tenderness area of ecchymosis to the mid tibial region no erythema warmth or crepitus.  Neurovascular intact      MDM / Plan  -Patient historian/Independent historians: Patient and daughter  -Prior records reviewed: Chart review-notes  -Plan: Ultrasound    -The patient's chief complaint is an acute problem.    *Refer to ED Workup tab and PA chart for further documentation.     Eldon Casiano, DO  03/16/25 1132